# Patient Record
Sex: FEMALE | Race: OTHER | Employment: UNEMPLOYED | URBAN - METROPOLITAN AREA
[De-identification: names, ages, dates, MRNs, and addresses within clinical notes are randomized per-mention and may not be internally consistent; named-entity substitution may affect disease eponyms.]

---

## 2017-09-01 PROBLEM — O35.5XX0 DRUG FETAL DAMAGE SUSPECTED IN PREGNANCY: Status: ACTIVE | Noted: 2017-09-01

## 2017-09-19 PROBLEM — O26.899 ABDOMINAL CRAMPING AFFECTING PREGNANCY, ANTEPARTUM: Status: ACTIVE | Noted: 2017-09-19

## 2017-12-08 PROBLEM — Z34.90 INTRAUTERINE PREGNANCY: Status: ACTIVE | Noted: 2017-12-08

## 2017-12-20 PROBLEM — O34.43 PREVIOUS OPERATION TO CERVIX AFFECTING PREGNANCY IN THIRD TRIMESTER: Status: ACTIVE | Noted: 2017-09-01

## 2018-03-03 PROBLEM — F29 PSYCHOSIS (HCC): Status: ACTIVE | Noted: 2018-03-03

## 2018-03-05 PROBLEM — F23 BRIEF PSYCHOTIC DISORDER (HCC): Status: ACTIVE | Noted: 2018-03-05

## 2021-10-18 PROBLEM — F39 MOOD DISORDER (HCC): Status: ACTIVE | Noted: 2021-10-18

## 2021-10-19 PROBLEM — F31.2 SEVERE MANIC BIPOLAR 1 DISORDER WITH PSYCHOTIC BEHAVIOR (HCC): Status: ACTIVE | Noted: 2021-10-19

## 2023-01-21 ENCOUNTER — APPOINTMENT (OUTPATIENT)
Dept: GENERAL RADIOLOGY | Age: 38
End: 2023-01-21
Payer: COMMERCIAL

## 2023-01-21 ENCOUNTER — HOSPITAL ENCOUNTER (EMERGENCY)
Age: 38
Discharge: HOME | End: 2023-01-21
Attending: EMERGENCY MEDICINE
Payer: COMMERCIAL

## 2023-01-21 VITALS
TEMPERATURE: 97.8 F | SYSTOLIC BLOOD PRESSURE: 131 MMHG | HEART RATE: 81 BPM | DIASTOLIC BLOOD PRESSURE: 78 MMHG | BODY MASS INDEX: 32.1 KG/M2 | WEIGHT: 170 LBS | OXYGEN SATURATION: 99 % | HEIGHT: 61 IN | RESPIRATION RATE: 16 BRPM

## 2023-01-21 DIAGNOSIS — R10.13 ABDOMINAL PAIN, EPIGASTRIC: Primary | ICD-10-CM

## 2023-01-21 DIAGNOSIS — R51.9 ACUTE NONINTRACTABLE HEADACHE, UNSPECIFIED HEADACHE TYPE: ICD-10-CM

## 2023-01-21 LAB
ALBUMIN SERPL-MCNC: 4.7 G/DL (ref 3.5–5.2)
ALP BLD-CCNC: 67 U/L (ref 35–104)
ALT SERPL-CCNC: 22 U/L (ref 0–32)
ANION GAP SERPL CALCULATED.3IONS-SCNC: 11 MMOL/L (ref 7–16)
AST SERPL-CCNC: 21 U/L (ref 0–31)
BACTERIA: NORMAL /HPF
BASOPHILS ABSOLUTE: 0.06 E9/L (ref 0–0.2)
BASOPHILS RELATIVE PERCENT: 1 % (ref 0–2)
BILIRUB SERPL-MCNC: 0.2 MG/DL (ref 0–1.2)
BILIRUBIN URINE: NEGATIVE
BLOOD, URINE: ABNORMAL
BUN BLDV-MCNC: 6 MG/DL (ref 6–20)
CALCIUM SERPL-MCNC: 9.4 MG/DL (ref 8.6–10.2)
CHLORIDE BLD-SCNC: 101 MMOL/L (ref 98–107)
CLARITY: CLEAR
CO2: 23 MMOL/L (ref 22–29)
COLOR: YELLOW
CREAT SERPL-MCNC: 0.7 MG/DL (ref 0.5–1)
EOSINOPHILS ABSOLUTE: 0.04 E9/L (ref 0.05–0.5)
EOSINOPHILS RELATIVE PERCENT: 0.6 % (ref 0–6)
EPITHELIAL CELLS, UA: NORMAL /HPF
GFR SERPL CREATININE-BSD FRML MDRD: >60 ML/MIN/1.73
GLUCOSE BLD-MCNC: 91 MG/DL (ref 74–99)
GLUCOSE URINE: NEGATIVE MG/DL
HCG(URINE) PREGNANCY TEST: NEGATIVE
HCG, URINE, POC: NEGATIVE
HCT VFR BLD CALC: 37.4 % (ref 34–48)
HEMOGLOBIN: 12.4 G/DL (ref 11.5–15.5)
IMMATURE GRANULOCYTES #: 0.02 E9/L
IMMATURE GRANULOCYTES %: 0.3 % (ref 0–5)
KETONES, URINE: NEGATIVE MG/DL
LEUKOCYTE ESTERASE, URINE: NEGATIVE
LIPASE: 41 U/L (ref 13–60)
LYMPHOCYTES ABSOLUTE: 2.1 E9/L (ref 1.5–4)
LYMPHOCYTES RELATIVE PERCENT: 33.8 % (ref 20–42)
Lab: NORMAL
MCH RBC QN AUTO: 31.2 PG (ref 26–35)
MCHC RBC AUTO-ENTMCNC: 33.2 % (ref 32–34.5)
MCV RBC AUTO: 94.2 FL (ref 80–99.9)
MONOCYTES ABSOLUTE: 0.48 E9/L (ref 0.1–0.95)
MONOCYTES RELATIVE PERCENT: 7.7 % (ref 2–12)
NEGATIVE QC PASS/FAIL: NORMAL
NEUTROPHILS ABSOLUTE: 3.52 E9/L (ref 1.8–7.3)
NEUTROPHILS RELATIVE PERCENT: 56.6 % (ref 43–80)
NITRITE, URINE: NEGATIVE
PDW BLD-RTO: 13.2 FL (ref 11.5–15)
PH UA: 7 (ref 5–9)
PLATELET # BLD: 330 E9/L (ref 130–450)
PMV BLD AUTO: 9.6 FL (ref 7–12)
POSITIVE QC PASS/FAIL: NORMAL
POTASSIUM REFLEX MAGNESIUM: 4 MMOL/L (ref 3.5–5)
PROTEIN UA: NEGATIVE MG/DL
RBC # BLD: 3.97 E12/L (ref 3.5–5.5)
RBC UA: NORMAL /HPF (ref 0–2)
SODIUM BLD-SCNC: 135 MMOL/L (ref 132–146)
SPECIFIC GRAVITY UA: 1.01 (ref 1–1.03)
TOTAL PROTEIN: 7.7 G/DL (ref 6.4–8.3)
TROPONIN, HIGH SENSITIVITY: <6 NG/L (ref 0–9)
UROBILINOGEN, URINE: 0.2 E.U./DL
WBC # BLD: 6.2 E9/L (ref 4.5–11.5)
WBC UA: NORMAL /HPF (ref 0–5)

## 2023-01-21 PROCEDURE — 2580000003 HC RX 258

## 2023-01-21 PROCEDURE — 81025 URINE PREGNANCY TEST: CPT

## 2023-01-21 PROCEDURE — 93005 ELECTROCARDIOGRAM TRACING: CPT

## 2023-01-21 PROCEDURE — 85025 COMPLETE CBC W/AUTO DIFF WBC: CPT

## 2023-01-21 PROCEDURE — 81001 URINALYSIS AUTO W/SCOPE: CPT

## 2023-01-21 PROCEDURE — 80053 COMPREHEN METABOLIC PANEL: CPT

## 2023-01-21 PROCEDURE — 71045 X-RAY EXAM CHEST 1 VIEW: CPT

## 2023-01-21 PROCEDURE — 96374 THER/PROPH/DIAG INJ IV PUSH: CPT

## 2023-01-21 PROCEDURE — 96372 THER/PROPH/DIAG INJ SC/IM: CPT

## 2023-01-21 PROCEDURE — 84484 ASSAY OF TROPONIN QUANT: CPT

## 2023-01-21 PROCEDURE — 6370000000 HC RX 637 (ALT 250 FOR IP)

## 2023-01-21 PROCEDURE — 83690 ASSAY OF LIPASE: CPT

## 2023-01-21 PROCEDURE — 6360000002 HC RX W HCPCS

## 2023-01-21 PROCEDURE — 99285 EMERGENCY DEPT VISIT HI MDM: CPT

## 2023-01-21 RX ORDER — DICYCLOMINE HYDROCHLORIDE 10 MG/ML
20 INJECTION INTRAMUSCULAR ONCE
Status: COMPLETED | OUTPATIENT
Start: 2023-01-21 | End: 2023-01-21

## 2023-01-21 RX ORDER — 0.9 % SODIUM CHLORIDE 0.9 %
1000 INTRAVENOUS SOLUTION INTRAVENOUS ONCE
Status: COMPLETED | OUTPATIENT
Start: 2023-01-21 | End: 2023-01-21

## 2023-01-21 RX ORDER — METOCLOPRAMIDE HYDROCHLORIDE 5 MG/ML
10 INJECTION INTRAMUSCULAR; INTRAVENOUS ONCE
Status: COMPLETED | OUTPATIENT
Start: 2023-01-21 | End: 2023-01-21

## 2023-01-21 RX ADMIN — SODIUM CHLORIDE 1000 ML: 9 INJECTION, SOLUTION INTRAVENOUS at 16:09

## 2023-01-21 RX ADMIN — METOCLOPRAMIDE 10 MG: 5 INJECTION, SOLUTION INTRAMUSCULAR; INTRAVENOUS at 16:16

## 2023-01-21 RX ADMIN — DICYCLOMINE HYDROCHLORIDE 20 MG: 10 INJECTION, SOLUTION INTRAMUSCULAR at 16:19

## 2023-01-21 RX ADMIN — ALUMINUM HYDROXIDE, MAGNESIUM HYDROXIDE, AND SIMETHICONE: 200; 200; 20 SUSPENSION ORAL at 16:08

## 2023-01-21 ASSESSMENT — PAIN - FUNCTIONAL ASSESSMENT: PAIN_FUNCTIONAL_ASSESSMENT: NONE - DENIES PAIN

## 2023-01-21 NOTE — ED PROVIDER NOTES
[address]        Pt Name: Rosas Munoz  MRN: [id number]  [place] 1985  Date of evaluation: 1/21/2023  Provider: Manjit Barboza DO  PCP: No primary care provider on file. Note Started: 3:19 PM EST 1/21/23    CHIEF COMPLAINT       Chief Complaint   Patient presents with    Abdominal Pain     Mid upper abdominal pain radiates into back, stabbing pain constant x1 week    Headache    Nausea       HISTORY OF PRESENT ILLNESS: 1 or more Elements   History From: Patient    Limitations to history : None    Rosas Munoz is a 40 y.o. female who presents to the emergency department with chief complaint of epigastric abdominal pain with some nausea over the past week. She states that it has been worsening and that yesterday it became very severe and she has been having some radiation to her mid back. She states that she also has a little bit of radiation near her sternum where it feels like pressure that seems to come and go. In addition to this she has a little bit of abdominal distention and some constipation however she says that she did have a normal bowel movement yesterday evening and has been able to pass gas since then. Nothing seems to worsen or improve her symptoms. Patient is known to gastroenterology and is prescribed Protonix as well as Zofran for GI symptoms. Patient is scheduled for EGD in the near future. She does admit to having a little bit of headache as well that started this morning and was gradual in onset described as a dull achy pain without meningeal symptoms. She has had headaches like this before. She states that she has not had any vomiting and denies any fever, chills, hematuria or dysuria, diarrhea. Nursing Notes were all reviewed and agreed with or any disagreements were addressed in the HPI. REVIEW OF SYSTEMS :      Positives and Pertinent negatives as per HPI.      SURGICAL HISTORY Past Surgical History:   Procedure Laterality Date    BREAST SURGERY      Removal of cyst on left     SECTION      x4       CURRENTMEDICATIONS       Discharge Medication List as of 2023  5:53 PM        CONTINUE these medications which have NOT CHANGED    Details   famotidine (PEPCID) 20 MG tablet Take 1 tablet by mouth 2 times daily for 20 days, Disp-40 tablet, R-0Normal      ondansetron (ZOFRAN-ODT) 4 MG disintegrating tablet Take 1 tablet by mouth 3 times daily as needed for Nausea or Vomiting, Disp-6 tablet, R-0Normal      gabapentin (NEURONTIN) 300 MG capsule Take 1 capsule by mouth 3 times daily for 90 days.  Intended supply: 30 days, Disp-90 capsule, R-2Normal      ARIPiprazole (ABILIFY IM) Inject into the muscleHistorical Med      ibuprofen (ADVIL;MOTRIN) 800 MG tablet Take 1 tablet by mouth every 8 hours as needed for Pain, Disp-60 tablet, R-0Print             ALLERGIES     Pcn [penicillins]    FAMILYHISTORY       Family History   Problem Relation Age of Onset    Diabetes Paternal Grandmother     Diabetes Maternal Grandmother     Diabetes Maternal Grandfather     Diabetes Father     Heart Attack Father         SOCIAL HISTORY       Social History     Tobacco Use    Smoking status: Every Day     Packs/day: 1.00     Years: 16.00     Pack years: 16.00     Types: Cigarettes    Smokeless tobacco: Never    Tobacco comments:     Pt states she used vapor   Vaping Use    Vaping Use: Former    Substances: Always   Substance Use Topics    Alcohol use: Yes     Comment: socially    Drug use: Yes     Types: Marijuana (Weed)       SCREENINGS        [facility] Coma Scale  Eye Opening: Spontaneous  Best Verbal Response: Oriented  Best Motor Response: Obeys commands  [place] Coma Scale Score: 15                CIWA Assessment  BP: 131/78  Heart Rate: 81           PHYSICAL EXAM  1 or more Elements     ED Triage Vitals [23 1409]   BP Temp Temp Source Heart Rate Resp SpO2 Height Weight   123/87 97.8 °F (36.6 °C) Oral 79 14 100 % 5' 1\" (1.549 m) 170 lb (77.1 kg)       Constitutional/General: Alert and oriented x3  Head: Normocephalic and atraumatic  Eyes: PERRL, EOMI, conjunctiva normal, sclera non icteric  ENT:  Oropharynx clear, handling secretions, no trismus, no asymmetry of the posterior oropharynx or uvular edema  Neck: Supple, full ROM, no stridor, no meningeal signs  Respiratory: Lungs clear to auscultation bilaterally, no wheezes, rales, or rhonchi. Not in respiratory distress  Cardiovascular:  Regular rate. Regular rhythm. No murmurs, no gallops, no rubs. 2+ distal pulses. Equal extremity pulses. Chest: No chest wall tenderness  GI:  Abdomen Soft, mild epigastric discomfort with slight distention. +BS. No rebound, guarding, or rigidity. No pulsatile masses. Musculoskeletal: Moves all extremities x 4. Warm and well perfused, no clubbing, no cyanosis, no edema. Capillary refill <3 seconds  Integument: skin warm and dry. No rashes. Neurologic: GCS 15, no focal deficits, symmetric strength 5/5 in the upper and lower extremities bilaterally  Psychiatric: Normal Affect      DIAGNOSTIC RESULTS   LABS:    Labs Reviewed   CBC WITH AUTO DIFFERENTIAL - Abnormal; Notable for the following components:       Result Value    Eosinophils Absolute 0.04 (*)     All other components within normal limits   URINALYSIS - Abnormal; Notable for the following components:    Blood, Urine SMALL (*)     All other components within normal limits   COMPREHENSIVE METABOLIC PANEL W/ REFLEX TO MG FOR LOW K   LIPASE   TROPONIN   PREGNANCY, URINE   MICROSCOPIC URINALYSIS   POC PREGNANCY UR-QUAL       As interpreted by me, the above displayed labs are abnormal. All other labs obtained during this visit were within normal range or not returned as of this dictation. EKG Interpretation  Interpreted by emergency department resident physician, Floyd Mcdaniel, DO  Normal sinus bradycardia 57 bpm with QTC of 422. Normal axis.   No acute ST changes or elevations noted as compared to previous EKG on 1/5/2023. RADIOLOGY:   Non-plain film images such as CT, Ultrasound and MRI are read by the radiologist. Plain radiographic images are visualized and preliminarily interpreted by the ED Provider with the below findings:    My interpretation of portable chest x-ray is no pneumonia, pneumothorax, or acute fractures. Interpretation per the Radiologist below, if available at the time of this note:    XR CHEST PORTABLE   Final Result   Normal chest           No results found. No results found. PROCEDURES   Unless otherwise noted below, none    PAST MEDICAL HISTORY/Chronic Conditions Affecting Care      has a past medical history of Anxiety, Bipolar disorder ([facility] [address] Depression, and Seizures ([facility] [address] (11/17/2018).      EMERGENCY DEPARTMENT COURSE    Vitals:    Vitals:    01/21/23 1409 01/21/23 1819   BP: 123/87 131/78   Pulse: 79 81   Resp: 14 16   Temp: 97.8 °F (36.6 °C) 97.8 °F (36.6 °C)   TempSrc: Oral Oral   SpO2: 100% 99%   Weight: 170 lb (77.1 kg)    Height: 5' 1\" (1.549 m)        Patient was given the following medications:  Medications   dicyclomine (BENTYL) injection 20 mg (20 mg IntraMUSCular Given 1/21/23 1619)   aluminum & magnesium hydroxide-simethicone (MAALOX) 30 mL, lidocaine viscous hcl (XYLOCAINE) 5 mL (GI COCKTAIL) ( Oral Given 1/21/23 1608)   0.9 % sodium chloride bolus (0 mLs IntraVENous Stopped 1/21/23 1743)   metoclopramide (REGLAN) injection 10 mg (10 mg IntraVENous Given 1/21/23 1616)       Medical Decision Making/Differential Diagnosis:    CC/HPI Summary, Social Determinants of health, Records Reviewed, DDx, testing done/not done, ED Course, Reassessment, disposition considerations/shared decision making with patient, consults, disposition:        CC/HPI Summary, DDx, ED Course, Reassessment, Tests Considered, Patient expectation:     55-year-old female presenting to the emergency department chief complaint of abdominal pain and headache. Differential diagnosis for the patient includes peptic ulcer disease, gastroesophageal reflux, tension type headache or migraine type headache. Patient has significant improvement of her symptoms after 1 L of fluid, Reglan, and GI cocktail. Due to her presenting symptoms, patient's diet consisting largely of fried foods as well as spicy foods and laboratory results, high likelihood for gastroesophageal reflux disease. ED Course as of 01/21/23 2313   Sat Jan 21, 2023   1517 Evaluated at bedside at this time and is resting comfortably and is hemodynamically stable. Laboratory studies ordered and patient to be given Bentyl, GI cocktail, and 1 L of normal saline. Pending urine pregnancy to give Zofran or Reglan for nausea. [RW]   [phone] Patient seen and examined. Patient reporting epigastric and chest pain. She has a history of GERD. Labs and imaging will be ordered to evaluate for possible GERD versus gastritis patient is complaining of some chest pain so a troponin and EKG were added on. Symptoms seem consistent with GI concern. But will check cardiac lab work. [CF]   3112 Patient reevaluated at this time and laboratory results grossly within normal limits. Patient getting medications right now, to be reevaluated shortly. Pending EKG and chest x-ray. [RW]   9552 Patient reevaluated at bedside and is resting comfortably and states that she is feeling significantly improved after medication administration. Pending chest x-ray [RW]   1722 Patient reevaluated and states that she feels well at this time. Informed of laboratory and imaging studies. Pending EKG and chest x-ray results. [RW]   0537 Chest x-ray returns within normal limits without pneumonia, pneumothorax, or acute fractures. [RW]   1746 EKG: This EKG is signed and interpreted by the EP.     Time: 17:41  Rate: 57  Rhythm: Sinus  Interpretation: sinus bradycardia  Comparison: stable as compared to patient's most recent EKG   [CF] [phone] 09 with patient and she is willing to be discharged home and states that she feels significantly better. [RW]      ED Course User Index  [CF] Dinora Stoll DO  [RW] Mildred Pemberton DO   Informed of all laboratory and imaging findings and all of her questions were answered. She has remained closely monitored, she has remained hemodynamically stable, and decision to discharge home has been made. Patient is understanding and agreeable with this decision. She is encouraged to follow-up with her family physician over the next several days and to return to this emergency department if her symptoms persist or worsen. She is also instructed to reach out to her gastroenterologist on Monday morning and let them know of this emergency department visit. Patient discharged home at this time. Considered more extensive work-up including CT abdomen pelvis however patient has had improvement of her symptoms after given medications and her abdominal exam was relatively unimpressive in terms of pain. No further evaluation determined to be needed and abdominal labs returned within normal limits as well. Social Determinants affecting Dx or Tx: Patient has good follow-up with her specialist for gastroenterology but does not have a family physician. Will be referred to a local family physician for continuity of care. Chronic Conditions: Depression and anxiety as well as bipolar disorder. Records Reviewed: EKG from 1/5/2023 reviewed in comparison to EKG performed today. I am the Primary Clinician of Record. CONSULTS: (Who and What was discussed)  None    I am the Primary Clinician of Record. FINAL IMPRESSION      1. Abdominal pain, epigastric    2.  Acute nonintractable headache, unspecified headache type          DISPOSITION/PLAN     DISPOSITION Decision To Discharge 01/21/2023 05:53:32 PM      PATIENT REFERRED TO:  [address] [place] [address] Emergency Department  [address] [place] [address] [address] [facility] [address] [phone]  Go to   If symptoms worsen    [facility]) PCP  [phone]  Schedule an appointment as soon as possible for a visit in 2 days  Establish care    DISCHARGE MEDICATIONS:  Discharge Medication List as of 1/21/2023  5:53 PM               (Please note that portions of this note were completed with a voice recognition program.  Efforts were made to edit the dictations but occasionally words are mis-transcribed.)    Orly Varghese DO (electronically signed)           Orly Varghese DO  Resident  01/21/23 6048

## 2023-01-21 NOTE — ED NOTES
Department of Emergency Medicine    FIRST PROVIDER TRIAGE NOTE             Independent [facility]LP           23  2:11 PM EST    Date of Encounter: 23   MRN: [id number]    Vitals:    23 1409   BP: 123/87   Pulse: 79   Resp: 14   Temp: 97.8 °F (36.6 °C)   TempSrc: Oral   SpO2: 100%   Weight: 170 lb (77.1 kg)   Height: 5' 1\" (1.549 m)      HPI: Sri Arias is a 40 y.o. female who presents to the ED for Abdominal Pain (Mid upper abdominal pain radiates into back, stabbing pain constant x1 week), Headache, and Nausea   Hx      ROS: Negative for cp or sob. Physical Exam:   Gen Appearance/Constitutional: alert  CV: regular rate     Initial Plan of Care: All treatment areas with department are currently occupied. Plan to order/Initiate the following while awaiting opening in ED: labs.     Initial Plan of Care: Initiate Treatment-Testing, Proceed toTreatment Area When Bed Available for ED Attending/MLP to Continue Care    Electronically signed by Brendan Díaz PA-C   DD: 23       Brendan Díaz PA-C  [phone]

## 2023-01-21 NOTE — DISCHARGE INSTRUCTIONS
Please call and follow-up with your newly referred family physician over the next several days and return to the emergency department if symptoms persist or worsen. Contact your gastroenterologist and inform them of this emergency department visit. Continue regularly scheduled medications. Reduce ingestion of spicy foods, acidic foods, fried foods, and be sure to not eat too closely to bedtime.

## 2023-01-22 LAB
EKG ATRIAL RATE: 57 BPM
EKG P AXIS: 51 DEGREES
EKG P-R INTERVAL: 142 MS
EKG Q-T INTERVAL: 434 MS
EKG QRS DURATION: 74 MS
EKG QTC CALCULATION (BAZETT): 422 MS
EKG R AXIS: 17 DEGREES
EKG T AXIS: 20 DEGREES
EKG VENTRICULAR RATE: 57 BPM

## 2023-01-22 PROCEDURE — 93010 ELECTROCARDIOGRAM REPORT: CPT | Performed by: INTERNAL MEDICINE

## 2023-08-06 PROCEDURE — 99285 EMERGENCY DEPT VISIT HI MDM: CPT

## 2023-08-06 ASSESSMENT — PAIN DESCRIPTION - LOCATION: LOCATION: ABDOMEN;HEAD

## 2023-08-06 ASSESSMENT — PAIN DESCRIPTION - ORIENTATION: ORIENTATION: MID

## 2023-08-06 ASSESSMENT — PAIN - FUNCTIONAL ASSESSMENT: PAIN_FUNCTIONAL_ASSESSMENT: 0-10

## 2023-08-06 ASSESSMENT — PAIN SCALES - GENERAL: PAINLEVEL_OUTOF10: 10

## 2023-08-06 ASSESSMENT — PAIN DESCRIPTION - PAIN TYPE: TYPE: ACUTE PAIN

## 2023-08-06 ASSESSMENT — PAIN DESCRIPTION - DESCRIPTORS: DESCRIPTORS: ACHING

## 2023-08-07 ENCOUNTER — APPOINTMENT (OUTPATIENT)
Dept: CT IMAGING | Age: 38
End: 2023-08-07
Payer: COMMERCIAL

## 2023-08-07 ENCOUNTER — APPOINTMENT (OUTPATIENT)
Dept: GENERAL RADIOLOGY | Age: 38
End: 2023-08-07
Payer: COMMERCIAL

## 2023-08-07 ENCOUNTER — HOSPITAL ENCOUNTER (EMERGENCY)
Age: 38
Discharge: HOME | End: 2023-08-07
Attending: STUDENT IN AN ORGANIZED HEALTH CARE EDUCATION/TRAINING PROGRAM
Payer: COMMERCIAL

## 2023-08-07 VITALS
HEART RATE: 61 BPM | DIASTOLIC BLOOD PRESSURE: 69 MMHG | BODY MASS INDEX: 28.32 KG/M2 | RESPIRATION RATE: 18 BRPM | WEIGHT: 150 LBS | TEMPERATURE: 98.6 F | HEIGHT: 61 IN | OXYGEN SATURATION: 98 % | SYSTOLIC BLOOD PRESSURE: 95 MMHG

## 2023-08-07 DIAGNOSIS — R10.30 LOWER ABDOMINAL PAIN: ICD-10-CM

## 2023-08-07 DIAGNOSIS — K59.00 CONSTIPATION, UNSPECIFIED CONSTIPATION TYPE: Primary | ICD-10-CM

## 2023-08-07 LAB
ALBUMIN SERPL-MCNC: 3.8 G/DL (ref 3.5–5.2)
ALP SERPL-CCNC: 89 U/L (ref 35–104)
ALT SERPL-CCNC: 22 U/L (ref 0–32)
AMORPH SED URNS QL MICRO: PRESENT
ANION GAP SERPL CALCULATED.3IONS-SCNC: 10 MMOL/L (ref 7–16)
AST SERPL-CCNC: 21 U/L (ref 0–31)
BACTERIA URNS QL MICRO: ABNORMAL
BASOPHILS # BLD: 0.06 K/UL (ref 0–0.2)
BASOPHILS NFR BLD: 1 % (ref 0–2)
BILIRUB DIRECT SERPL-MCNC: <0.2 MG/DL (ref 0–0.3)
BILIRUB INDIRECT SERPL-MCNC: NORMAL MG/DL (ref 0–1)
BILIRUB SERPL-MCNC: <0.2 MG/DL (ref 0–1.2)
BILIRUB UR QL STRIP: NEGATIVE
BUN SERPL-MCNC: 14 MG/DL (ref 6–20)
CALCIUM SERPL-MCNC: 8.9 MG/DL (ref 8.6–10.2)
CHLORIDE SERPL-SCNC: 104 MMOL/L (ref 98–107)
CLARITY UR: CLEAR
CO2 SERPL-SCNC: 23 MMOL/L (ref 22–29)
COLOR UR: YELLOW
CREAT SERPL-MCNC: 0.7 MG/DL (ref 0.5–1)
D DIMER: <200 NG/ML DDU (ref 0–232)
EKG ATRIAL RATE: 59 BPM
EKG P AXIS: 53 DEGREES
EKG P-R INTERVAL: 130 MS
EKG Q-T INTERVAL: 434 MS
EKG QRS DURATION: 82 MS
EKG QTC CALCULATION (BAZETT): 429 MS
EKG R AXIS: 31 DEGREES
EKG T AXIS: 25 DEGREES
EKG VENTRICULAR RATE: 59 BPM
EOSINOPHIL # BLD: 0.19 K/UL (ref 0.05–0.5)
EOSINOPHILS RELATIVE PERCENT: 2 % (ref 0–6)
ERYTHROCYTE [DISTWIDTH] IN BLOOD BY AUTOMATED COUNT: 13.3 % (ref 11.5–15)
GFR SERPL CREATININE-BSD FRML MDRD: >60 ML/MIN/1.73M2
GLUCOSE SERPL-MCNC: 97 MG/DL (ref 74–99)
GLUCOSE UR STRIP-MCNC: NEGATIVE MG/DL
HCG, URINE, POC: NEGATIVE
HCT VFR BLD AUTO: 38.5 % (ref 34–48)
HGB BLD-MCNC: 12.6 G/DL (ref 11.5–15.5)
HGB UR QL STRIP.AUTO: ABNORMAL
IMM GRANULOCYTES # BLD AUTO: 0.05 K/UL (ref 0–0.58)
IMM GRANULOCYTES NFR BLD: 1 % (ref 0–5)
KETONES UR STRIP-MCNC: NEGATIVE MG/DL
LACTATE BLDV-SCNC: 0.9 MMOL/L (ref 0.5–2.2)
LEUKOCYTE ESTERASE UR QL STRIP: NEGATIVE
LIPASE SERPL-CCNC: 64 U/L (ref 13–60)
LYMPHOCYTES NFR BLD: 3.52 K/UL (ref 1.5–4)
LYMPHOCYTES RELATIVE PERCENT: 33 % (ref 20–42)
Lab: NORMAL
MCH RBC QN AUTO: 31.3 PG (ref 26–35)
MCHC RBC AUTO-ENTMCNC: 32.7 G/DL (ref 32–34.5)
MCV RBC AUTO: 95.5 FL (ref 80–99.9)
MONOCYTES NFR BLD: 0.77 K/UL (ref 0.1–0.95)
MONOCYTES NFR BLD: 7 % (ref 2–12)
NEGATIVE QC PASS/FAIL: NORMAL
NEUTROPHILS NFR BLD: 57 % (ref 43–80)
NEUTS SEG NFR BLD: 6.09 K/UL (ref 1.8–7.3)
NITRITE UR QL STRIP: NEGATIVE
PH UR STRIP: 6 [PH] (ref 5–9)
PLATELET # BLD AUTO: 259 K/UL (ref 130–450)
PMV BLD AUTO: 10.2 FL (ref 7–12)
POSITIVE QC PASS/FAIL: NORMAL
POTASSIUM SERPL-SCNC: 3.9 MMOL/L (ref 3.5–5)
PROT SERPL-MCNC: 6.4 G/DL (ref 6.4–8.3)
PROT UR STRIP-MCNC: NEGATIVE MG/DL
RBC # BLD AUTO: 4.03 M/UL (ref 3.5–5.5)
RBC #/AREA URNS HPF: ABNORMAL /HPF
SODIUM SERPL-SCNC: 137 MMOL/L (ref 132–146)
SP GR UR STRIP: 1.02 (ref 1–1.03)
TROPONIN I SERPL HS-MCNC: <6 NG/L (ref 0–9)
UROBILINOGEN UR STRIP-ACNC: 0.2 EU/DL (ref 0–1)
WBC #/AREA URNS HPF: ABNORMAL /HPF
WBC OTHER # BLD: 10.7 K/UL (ref 4.5–11.5)

## 2023-08-07 PROCEDURE — 6360000004 HC RX CONTRAST MEDICATION: Performed by: RADIOLOGY

## 2023-08-07 PROCEDURE — 74177 CT ABD & PELVIS W/CONTRAST: CPT

## 2023-08-07 PROCEDURE — 80053 COMPREHEN METABOLIC PANEL: CPT

## 2023-08-07 PROCEDURE — 93010 ELECTROCARDIOGRAM REPORT: CPT | Performed by: INTERNAL MEDICINE

## 2023-08-07 PROCEDURE — 83605 ASSAY OF LACTIC ACID: CPT

## 2023-08-07 PROCEDURE — 71045 X-RAY EXAM CHEST 1 VIEW: CPT

## 2023-08-07 PROCEDURE — 84484 ASSAY OF TROPONIN QUANT: CPT

## 2023-08-07 PROCEDURE — 93005 ELECTROCARDIOGRAM TRACING: CPT

## 2023-08-07 PROCEDURE — 85379 FIBRIN DEGRADATION QUANT: CPT

## 2023-08-07 PROCEDURE — 83690 ASSAY OF LIPASE: CPT

## 2023-08-07 PROCEDURE — 81001 URINALYSIS AUTO W/SCOPE: CPT

## 2023-08-07 PROCEDURE — 82248 BILIRUBIN DIRECT: CPT

## 2023-08-07 PROCEDURE — 85025 COMPLETE CBC W/AUTO DIFF WBC: CPT

## 2023-08-07 RX ORDER — DOCUSATE SODIUM 100 MG/1
100 CAPSULE, LIQUID FILLED ORAL 2 TIMES DAILY
Qty: 14 CAPSULE | Refills: 0 | Status: SHIPPED | OUTPATIENT
Start: 2023-08-07 | End: 2023-08-14

## 2023-08-07 RX ORDER — POLYETHYLENE GLYCOL 3350 17 G/17G
17 POWDER, FOR SOLUTION ORAL DAILY PRN
Qty: 510 G | Refills: 0 | Status: SHIPPED | OUTPATIENT
Start: 2023-08-07 | End: 2023-09-06

## 2023-08-07 RX ADMIN — IOPAMIDOL 75 ML: 755 INJECTION, SOLUTION INTRAVENOUS at 03:39

## 2023-08-07 ASSESSMENT — LIFESTYLE VARIABLES
HOW MANY STANDARD DRINKS CONTAINING ALCOHOL DO YOU HAVE ON A TYPICAL DAY: 3 OR 4
HOW OFTEN DO YOU HAVE A DRINK CONTAINING ALCOHOL: 2-4 TIMES A MONTH

## 2023-08-07 NOTE — ED PROVIDER NOTES
[address] [facility]        Pt Name: Dmitriy Alicia  MRN: [id number]  [address] [place] 1985  Date of evaluation: 8/6/2023  Provider: Dominique Michelle MD  PCP: No primary care provider on file. Note Started: 2:13 AM EDT 8/7/23    CHIEF COMPLAINT       Chief Complaint   Patient presents with    Abdominal Pain     Headache       HISTORY OF PRESENT ILLNESS: 1 or more Elements   History From: Patient    Limitations to history : None    Jesse Ciaraashi E Carlyle Canavan is a 40 y.o. female with past medical history of polysubstance abuse, bipolar 1 disorder who presents left lower quadrant abdominal pain that began earlier this evening. She states pain is constant, has been progressing, does not radiate, localized to the left lower quadrant, is made worse by eating, alleviating factors, is sharp and stabbing in quality, and is currently 8 out of 10 pain scale. Patient also endorses a headache she believes is secondary to the abdominal pain. Patient states headache is generalized, pressure-like sensation, has no aggravating or alleviating factors, and is currently an 8 out of 10 in severity. Patient states a past history of migraines, states this is less severe than previous migraines. Patient denies any photophobia or vision changes. Patient also endorses some chest tightness that began around the same time as abdominal pain, is generalized chest, has no alleviating or aggravating factors, and is mild in intensity. Patient denies any diarrhea, vomiting, nausea, urinary changes, genital odor or discharge, STD exposure, possibly of pregnancy, dizziness, or lightheadedness. Patient also endorses some low back pain, shortness of breath, and increased constipation recently. Patient does endorse smoking 1 to 2 packs/day of cigarettes, marijuana use, and recent EtOH use this past weekend.   Of note, patient has a past medical history of

## 2023-10-04 ENCOUNTER — OFFICE VISIT (OUTPATIENT)
Dept: NEUROSURGERY | Age: 38
End: 2023-10-04
Payer: COMMERCIAL

## 2023-10-04 DIAGNOSIS — G89.29 CHRONIC NECK AND BACK PAIN: Primary | ICD-10-CM

## 2023-10-04 DIAGNOSIS — M54.2 CHRONIC NECK AND BACK PAIN: Primary | ICD-10-CM

## 2023-10-04 DIAGNOSIS — M54.9 CHRONIC NECK AND BACK PAIN: Primary | ICD-10-CM

## 2023-10-04 DIAGNOSIS — M54.2 MUSCULOSKELETAL NECK PAIN: ICD-10-CM

## 2023-10-04 PROCEDURE — G8484 FLU IMMUNIZE NO ADMIN: HCPCS | Performed by: PHYSICIAN ASSISTANT

## 2023-10-04 PROCEDURE — G8427 DOCREV CUR MEDS BY ELIG CLIN: HCPCS | Performed by: PHYSICIAN ASSISTANT

## 2023-10-04 PROCEDURE — G8419 CALC BMI OUT NRM PARAM NOF/U: HCPCS | Performed by: PHYSICIAN ASSISTANT

## 2023-10-04 PROCEDURE — 4004F PT TOBACCO SCREEN RCVD TLK: CPT | Performed by: PHYSICIAN ASSISTANT

## 2023-10-04 PROCEDURE — 99212 OFFICE O/P EST SF 10 MIN: CPT

## 2023-10-04 PROCEDURE — 99214 OFFICE O/P EST MOD 30 MIN: CPT | Performed by: PHYSICIAN ASSISTANT

## 2023-10-04 RX ORDER — METHYLPREDNISOLONE 4 MG/1
TABLET ORAL
Qty: 1 KIT | Refills: 0 | Status: SHIPPED | OUTPATIENT
Start: 2023-10-04 | End: 2023-10-10

## 2023-10-04 RX ORDER — CYCLOBENZAPRINE HCL 10 MG
10 TABLET ORAL 3 TIMES DAILY PRN
Qty: 90 TABLET | Refills: 0 | Status: SHIPPED | OUTPATIENT
Start: 2023-10-04 | End: 2023-11-03

## 2023-10-04 NOTE — PROGRESS NOTES
Problem Focused Office Visit     Subjective: Mariah Torres is a 45 y.o.  female who presents to the office today c/o left sided neck pain with radiation down her left arm stopping at the elbow. Describes the pain as intermittent and sharp. Admits to headaches and numbness/tingling in both hands. Movement of her neck makes the pain worse. She has tried gabapentin without significant relief. Denies recent DANIEL or PT. Denies loss of bowel or bladder, saddle anesthesia, loss of dexterity, abnormal gait, fever, chills, N/V, SOB, or chest pain. Physical Exam:              WDWN, no apparent distress              Non-labored breathing               Vitals Stable              Alert and oriented x3              CN 3-12 intact              PERRL              EOMI              HOLCOMB well              Motor strength symmetric              Sensation to LT intact bilaterally    Assessment: This is a 45 y.o.  female presenting for evaluation of neck pain and left arm pain.      Plan:  -Obtain MRI cervical spine to evaluate for stenosis  -Medrol dosepack and flexeril sent to pharmacy  -PT/massage referral given  -OARRS report reviewed   -Call/return to Neurosurgery clinic after completion of imaging to discuss results and further treatment plan  -Call/return sooner if symptoms worsen or new issues arise in the interim       Electronically signed by Мария Espino PA-C on 10/4/2023 at 4:23 PM

## 2023-10-10 ENCOUNTER — OFFICE VISIT (OUTPATIENT)
Dept: FAMILY MEDICINE CLINIC | Age: 38
End: 2023-10-10
Payer: COMMERCIAL

## 2023-10-10 ENCOUNTER — APPOINTMENT (OUTPATIENT)
Dept: GENERAL RADIOLOGY | Age: 38
End: 2023-10-10
Payer: COMMERCIAL

## 2023-10-10 ENCOUNTER — HOSPITAL ENCOUNTER (EMERGENCY)
Age: 38
Discharge: HOME | End: 2023-10-10
Payer: COMMERCIAL

## 2023-10-10 VITALS
HEART RATE: 87 BPM | HEIGHT: 61 IN | DIASTOLIC BLOOD PRESSURE: 80 MMHG | SYSTOLIC BLOOD PRESSURE: 110 MMHG | OXYGEN SATURATION: 99 % | WEIGHT: 163.2 LBS | TEMPERATURE: 97.4 F | BODY MASS INDEX: 30.81 KG/M2

## 2023-10-10 VITALS
HEART RATE: 93 BPM | SYSTOLIC BLOOD PRESSURE: 138 MMHG | OXYGEN SATURATION: 100 % | TEMPERATURE: 97.3 F | BODY MASS INDEX: 30.78 KG/M2 | RESPIRATION RATE: 18 BRPM | WEIGHT: 163 LBS | DIASTOLIC BLOOD PRESSURE: 94 MMHG | HEIGHT: 61 IN

## 2023-10-10 DIAGNOSIS — M54.9 UPPER BACK PAIN: ICD-10-CM

## 2023-10-10 DIAGNOSIS — R07.9 CHEST PAIN, UNSPECIFIED TYPE: Primary | ICD-10-CM

## 2023-10-10 LAB
ANION GAP SERPL CALCULATED.3IONS-SCNC: 11 MMOL/L (ref 7–16)
BASOPHILS # BLD: 0.03 K/UL (ref 0–0.2)
BASOPHILS NFR BLD: 0 % (ref 0–2)
BUN SERPL-MCNC: 9 MG/DL (ref 6–20)
CALCIUM SERPL-MCNC: 9.3 MG/DL (ref 8.6–10.2)
CHLORIDE SERPL-SCNC: 100 MMOL/L (ref 98–107)
CO2 SERPL-SCNC: 25 MMOL/L (ref 22–29)
CREAT SERPL-MCNC: 0.7 MG/DL (ref 0.5–1)
EOSINOPHIL # BLD: 0.04 K/UL (ref 0.05–0.5)
EOSINOPHILS RELATIVE PERCENT: 0 % (ref 0–6)
ERYTHROCYTE [DISTWIDTH] IN BLOOD BY AUTOMATED COUNT: 13.1 % (ref 11.5–15)
GFR SERPL CREATININE-BSD FRML MDRD: >60 ML/MIN/1.73M2
GLUCOSE SERPL-MCNC: 106 MG/DL (ref 74–99)
HCT VFR BLD AUTO: 39 % (ref 34–48)
HGB BLD-MCNC: 13.1 G/DL (ref 11.5–15.5)
IMM GRANULOCYTES # BLD AUTO: 0.04 K/UL (ref 0–0.58)
IMM GRANULOCYTES NFR BLD: 0 % (ref 0–5)
INFLUENZA A ANTIBODY: NORMAL
INFLUENZA B ANTIBODY: NORMAL
LYMPHOCYTES NFR BLD: 2.85 K/UL (ref 1.5–4)
LYMPHOCYTES RELATIVE PERCENT: 29 % (ref 20–42)
Lab: NORMAL
MCH RBC QN AUTO: 32 PG (ref 26–35)
MCHC RBC AUTO-ENTMCNC: 33.6 G/DL (ref 32–34.5)
MCV RBC AUTO: 95.1 FL (ref 80–99.9)
MONOCYTES NFR BLD: 0.58 K/UL (ref 0.1–0.95)
MONOCYTES NFR BLD: 6 % (ref 2–12)
NEUTROPHILS NFR BLD: 64 % (ref 43–80)
NEUTS SEG NFR BLD: 6.17 K/UL (ref 1.8–7.3)
PERFORMING INSTRUMENT: NORMAL
PLATELET # BLD AUTO: 390 K/UL (ref 130–450)
PMV BLD AUTO: 9.3 FL (ref 7–12)
POTASSIUM SERPL-SCNC: 3.7 MMOL/L (ref 3.5–5)
QC PASS/FAIL: NORMAL
RBC # BLD AUTO: 4.1 M/UL (ref 3.5–5.5)
SARS-COV-2, POC: NORMAL
SODIUM SERPL-SCNC: 136 MMOL/L (ref 132–146)
TROPONIN I SERPL HS-MCNC: <6 NG/L (ref 0–9)
WBC OTHER # BLD: 9.7 K/UL (ref 4.5–11.5)

## 2023-10-10 PROCEDURE — 93005 ELECTROCARDIOGRAM TRACING: CPT | Performed by: PHYSICIAN ASSISTANT

## 2023-10-10 PROCEDURE — G8484 FLU IMMUNIZE NO ADMIN: HCPCS | Performed by: PHYSICIAN ASSISTANT

## 2023-10-10 PROCEDURE — 93000 ELECTROCARDIOGRAM COMPLETE: CPT | Performed by: PHYSICIAN ASSISTANT

## 2023-10-10 PROCEDURE — 84484 ASSAY OF TROPONIN QUANT: CPT

## 2023-10-10 PROCEDURE — G8417 CALC BMI ABV UP PARAM F/U: HCPCS | Performed by: PHYSICIAN ASSISTANT

## 2023-10-10 PROCEDURE — 80048 BASIC METABOLIC PNL TOTAL CA: CPT

## 2023-10-10 PROCEDURE — 72040 X-RAY EXAM NECK SPINE 2-3 VW: CPT

## 2023-10-10 PROCEDURE — 6370000000 HC RX 637 (ALT 250 FOR IP): Performed by: PHYSICIAN ASSISTANT

## 2023-10-10 PROCEDURE — 99215 OFFICE O/P EST HI 40 MIN: CPT | Performed by: PHYSICIAN ASSISTANT

## 2023-10-10 PROCEDURE — 87804 INFLUENZA ASSAY W/OPTIC: CPT | Performed by: PHYSICIAN ASSISTANT

## 2023-10-10 PROCEDURE — G8427 DOCREV CUR MEDS BY ELIG CLIN: HCPCS | Performed by: PHYSICIAN ASSISTANT

## 2023-10-10 PROCEDURE — 4004F PT TOBACCO SCREEN RCVD TLK: CPT | Performed by: PHYSICIAN ASSISTANT

## 2023-10-10 PROCEDURE — 71046 X-RAY EXAM CHEST 2 VIEWS: CPT

## 2023-10-10 PROCEDURE — 99285 EMERGENCY DEPT VISIT HI MDM: CPT

## 2023-10-10 PROCEDURE — 85025 COMPLETE CBC W/AUTO DIFF WBC: CPT

## 2023-10-10 PROCEDURE — 87426 SARSCOV CORONAVIRUS AG IA: CPT | Performed by: PHYSICIAN ASSISTANT

## 2023-10-10 RX ORDER — LAMOTRIGINE 25 MG/1
TABLET ORAL
COMMUNITY
Start: 2023-07-06

## 2023-10-10 RX ORDER — BUSPIRONE HYDROCHLORIDE 15 MG/1
TABLET ORAL
COMMUNITY
Start: 2023-09-28

## 2023-10-10 RX ORDER — NAPROXEN 500 MG/1
500 TABLET ORAL ONCE
Status: COMPLETED | OUTPATIENT
Start: 2023-10-10 | End: 2023-10-10

## 2023-10-10 RX ORDER — ARIPIPRAZOLE 30 MG/1
TABLET ORAL
COMMUNITY
Start: 2023-09-28

## 2023-10-10 RX ORDER — MIRTAZAPINE 30 MG/1
TABLET, FILM COATED ORAL
COMMUNITY
Start: 2023-09-28

## 2023-10-10 RX ORDER — PREDNISONE 20 MG/1
20 TABLET ORAL 2 TIMES DAILY
Qty: 10 TABLET | Refills: 0 | Status: SHIPPED | OUTPATIENT
Start: 2023-10-10 | End: 2023-10-15

## 2023-10-10 RX ORDER — NAPROXEN 500 MG/1
500 TABLET ORAL 2 TIMES DAILY PRN
Qty: 14 TABLET | Refills: 0 | Status: SHIPPED | OUTPATIENT
Start: 2023-10-10

## 2023-10-10 RX ORDER — PREDNISONE 20 MG/1
20 TABLET ORAL ONCE
Status: COMPLETED | OUTPATIENT
Start: 2023-10-10 | End: 2023-10-10

## 2023-10-10 RX ADMIN — PREDNISONE 20 MG: 20 TABLET ORAL at 19:27

## 2023-10-10 RX ADMIN — NAPROXEN 500 MG: 500 TABLET ORAL at 19:27

## 2023-10-10 ASSESSMENT — PAIN DESCRIPTION - LOCATION: LOCATION: BACK;CHEST

## 2023-10-10 ASSESSMENT — PAIN - FUNCTIONAL ASSESSMENT: PAIN_FUNCTIONAL_ASSESSMENT: 0-10

## 2023-10-10 ASSESSMENT — PAIN SCALES - GENERAL: PAINLEVEL_OUTOF10: 9

## 2023-10-10 ASSESSMENT — PAIN DESCRIPTION - PAIN TYPE: TYPE: ACUTE PAIN

## 2023-10-10 ASSESSMENT — PAIN DESCRIPTION - DESCRIPTORS: DESCRIPTORS: SHARP

## 2023-10-10 ASSESSMENT — PAIN DESCRIPTION - FREQUENCY: FREQUENCY: CONTINUOUS

## 2023-10-10 NOTE — PROGRESS NOTES
ED      Clinical Impression:   Darrin Lee was seen today for pain. Diagnoses and all orders for this visit:    Chest pain, unspecified type  -     EKG 12 lead; Future  -     POCT Influenza A/B  -     POCT COVID-19, Antigen  -     EKG 12 lead        go directly to the emergency department.      SIGNATURE: Shani Bennett III, PA-C

## 2023-10-11 LAB
EKG ATRIAL RATE: 73 BPM
EKG P AXIS: 59 DEGREES
EKG P-R INTERVAL: 134 MS
EKG Q-T INTERVAL: 390 MS
EKG QRS DURATION: 74 MS
EKG QTC CALCULATION (BAZETT): 429 MS
EKG R AXIS: 65 DEGREES
EKG T AXIS: 51 DEGREES
EKG VENTRICULAR RATE: 73 BPM

## 2023-10-11 PROCEDURE — 93010 ELECTROCARDIOGRAM REPORT: CPT | Performed by: INTERNAL MEDICINE

## 2024-01-05 ENCOUNTER — HOSPITAL ENCOUNTER (EMERGENCY)
Age: 39
Discharge: LEFT BEFORE DISCHARGE | End: 2024-01-05
Attending: EMERGENCY MEDICINE
Payer: COMMERCIAL

## 2024-01-05 ENCOUNTER — APPOINTMENT (OUTPATIENT)
Dept: GENERAL RADIOLOGY | Age: 39
End: 2024-01-05
Payer: COMMERCIAL

## 2024-01-05 VITALS
DIASTOLIC BLOOD PRESSURE: 80 MMHG | BODY MASS INDEX: 32.1 KG/M2 | HEART RATE: 58 BPM | OXYGEN SATURATION: 98 % | WEIGHT: 170 LBS | HEIGHT: 61 IN | TEMPERATURE: 96.3 F | SYSTOLIC BLOOD PRESSURE: 132 MMHG | RESPIRATION RATE: 16 BRPM

## 2024-01-05 DIAGNOSIS — R07.9 CHEST PAIN, UNSPECIFIED TYPE: ICD-10-CM

## 2024-01-05 DIAGNOSIS — Z72.0 TOBACCO ABUSE: ICD-10-CM

## 2024-01-05 DIAGNOSIS — R06.2 WHEEZING: Primary | ICD-10-CM

## 2024-01-05 LAB
ALBUMIN SERPL-MCNC: 4 G/DL (ref 3.5–5.2)
ALP SERPL-CCNC: 67 U/L (ref 35–104)
ALT SERPL-CCNC: 19 U/L (ref 0–32)
ANION GAP SERPL CALCULATED.3IONS-SCNC: 14 MMOL/L (ref 7–16)
AST SERPL-CCNC: 17 U/L (ref 0–31)
B PARAP IS1001 DNA NPH QL NAA+NON-PROBE: NOT DETECTED
B PERT DNA SPEC QL NAA+PROBE: NOT DETECTED
BACTERIA URNS QL MICRO: ABNORMAL
BASOPHILS # BLD: 0.05 K/UL (ref 0–0.2)
BASOPHILS NFR BLD: 1 % (ref 0–2)
BILIRUB SERPL-MCNC: 0.4 MG/DL (ref 0–1.2)
BILIRUB UR QL STRIP: NEGATIVE
BNP SERPL-MCNC: 48 PG/ML (ref 0–125)
BUN SERPL-MCNC: 11 MG/DL (ref 6–20)
C PNEUM DNA NPH QL NAA+NON-PROBE: NOT DETECTED
CALCIUM SERPL-MCNC: 9.2 MG/DL (ref 8.6–10.2)
CHLORIDE SERPL-SCNC: 101 MMOL/L (ref 98–107)
CLARITY UR: CLEAR
CO2 SERPL-SCNC: 23 MMOL/L (ref 22–29)
COLOR UR: YELLOW
CREAT SERPL-MCNC: 0.7 MG/DL (ref 0.5–1)
D DIMER: <200 NG/ML DDU (ref 0–232)
EKG ATRIAL RATE: 61 BPM
EKG P AXIS: 44 DEGREES
EKG P-R INTERVAL: 144 MS
EKG Q-T INTERVAL: 412 MS
EKG QRS DURATION: 84 MS
EKG QTC CALCULATION (BAZETT): 414 MS
EKG R AXIS: 22 DEGREES
EKG T AXIS: 37 DEGREES
EKG VENTRICULAR RATE: 61 BPM
EOSINOPHIL # BLD: 0.15 K/UL (ref 0.05–0.5)
EOSINOPHILS RELATIVE PERCENT: 2 % (ref 0–6)
EPI CELLS #/AREA URNS HPF: ABNORMAL /HPF
ERYTHROCYTE [DISTWIDTH] IN BLOOD BY AUTOMATED COUNT: 12.4 % (ref 11.5–15)
FLUAV RNA NPH QL NAA+NON-PROBE: NOT DETECTED
FLUBV RNA NPH QL NAA+NON-PROBE: NOT DETECTED
GFR SERPL CREATININE-BSD FRML MDRD: >60 ML/MIN/1.73M2
GLUCOSE SERPL-MCNC: 94 MG/DL (ref 74–99)
GLUCOSE UR STRIP-MCNC: NEGATIVE MG/DL
HADV DNA NPH QL NAA+NON-PROBE: NOT DETECTED
HCG, URINE, POC: NEGATIVE
HCOV 229E RNA NPH QL NAA+NON-PROBE: NOT DETECTED
HCOV HKU1 RNA NPH QL NAA+NON-PROBE: NOT DETECTED
HCOV NL63 RNA NPH QL NAA+NON-PROBE: NOT DETECTED
HCOV OC43 RNA NPH QL NAA+NON-PROBE: NOT DETECTED
HCT VFR BLD AUTO: 37.7 % (ref 34–48)
HGB BLD-MCNC: 12 G/DL (ref 11.5–15.5)
HGB UR QL STRIP.AUTO: ABNORMAL
HMPV RNA NPH QL NAA+NON-PROBE: NOT DETECTED
HPIV1 RNA NPH QL NAA+NON-PROBE: NOT DETECTED
HPIV2 RNA NPH QL NAA+NON-PROBE: NOT DETECTED
HPIV3 RNA NPH QL NAA+NON-PROBE: NOT DETECTED
HPIV4 RNA NPH QL NAA+NON-PROBE: NOT DETECTED
IMM GRANULOCYTES # BLD AUTO: 0.04 K/UL (ref 0–0.58)
IMM GRANULOCYTES NFR BLD: 0 % (ref 0–5)
KETONES UR STRIP-MCNC: NEGATIVE MG/DL
LEUKOCYTE ESTERASE UR QL STRIP: NEGATIVE
LYMPHOCYTES NFR BLD: 3.64 K/UL (ref 1.5–4)
LYMPHOCYTES RELATIVE PERCENT: 40 % (ref 20–42)
Lab: NORMAL
M PNEUMO DNA NPH QL NAA+NON-PROBE: NOT DETECTED
MCH RBC QN AUTO: 29.3 PG (ref 26–35)
MCHC RBC AUTO-ENTMCNC: 31.8 G/DL (ref 32–34.5)
MCV RBC AUTO: 92 FL (ref 80–99.9)
MONOCYTES NFR BLD: 0.71 K/UL (ref 0.1–0.95)
MONOCYTES NFR BLD: 8 % (ref 2–12)
NEGATIVE QC PASS/FAIL: NORMAL
NEUTROPHILS NFR BLD: 49 % (ref 43–80)
NEUTS SEG NFR BLD: 4.43 K/UL (ref 1.8–7.3)
NITRITE UR QL STRIP: NEGATIVE
PH UR STRIP: 6 [PH] (ref 5–9)
PLATELET # BLD AUTO: 378 K/UL (ref 130–450)
PMV BLD AUTO: 9.4 FL (ref 7–12)
POSITIVE QC PASS/FAIL: NORMAL
POTASSIUM SERPL-SCNC: 3.6 MMOL/L (ref 3.5–5)
PROT SERPL-MCNC: 6.9 G/DL (ref 6.4–8.3)
PROT UR STRIP-MCNC: NEGATIVE MG/DL
RBC # BLD AUTO: 4.1 M/UL (ref 3.5–5.5)
RBC #/AREA URNS HPF: ABNORMAL /HPF
RSV RNA NPH QL NAA+NON-PROBE: NOT DETECTED
RV+EV RNA NPH QL NAA+NON-PROBE: NOT DETECTED
SARS-COV-2 RNA NPH QL NAA+NON-PROBE: NOT DETECTED
SODIUM SERPL-SCNC: 138 MMOL/L (ref 132–146)
SP GR UR STRIP: >1.03 (ref 1–1.03)
SPECIMEN DESCRIPTION: NORMAL
TROPONIN I SERPL HS-MCNC: <6 NG/L (ref 0–9)
UROBILINOGEN UR STRIP-ACNC: 0.2 EU/DL (ref 0–1)
WBC #/AREA URNS HPF: ABNORMAL /HPF
WBC OTHER # BLD: 9 K/UL (ref 4.5–11.5)

## 2024-01-05 PROCEDURE — 80053 COMPREHEN METABOLIC PANEL: CPT

## 2024-01-05 PROCEDURE — 6370000000 HC RX 637 (ALT 250 FOR IP): Performed by: EMERGENCY MEDICINE

## 2024-01-05 PROCEDURE — 6360000002 HC RX W HCPCS: Performed by: EMERGENCY MEDICINE

## 2024-01-05 PROCEDURE — 0202U NFCT DS 22 TRGT SARS-COV-2: CPT

## 2024-01-05 PROCEDURE — 85379 FIBRIN DEGRADATION QUANT: CPT

## 2024-01-05 PROCEDURE — 96372 THER/PROPH/DIAG INJ SC/IM: CPT

## 2024-01-05 PROCEDURE — 85025 COMPLETE CBC W/AUTO DIFF WBC: CPT

## 2024-01-05 PROCEDURE — 81001 URINALYSIS AUTO W/SCOPE: CPT

## 2024-01-05 PROCEDURE — 84484 ASSAY OF TROPONIN QUANT: CPT

## 2024-01-05 PROCEDURE — 71046 X-RAY EXAM CHEST 2 VIEWS: CPT

## 2024-01-05 PROCEDURE — 93010 ELECTROCARDIOGRAM REPORT: CPT | Performed by: INTERNAL MEDICINE

## 2024-01-05 PROCEDURE — 94664 DEMO&/EVAL PT USE INHALER: CPT

## 2024-01-05 PROCEDURE — 83880 ASSAY OF NATRIURETIC PEPTIDE: CPT

## 2024-01-05 PROCEDURE — 93005 ELECTROCARDIOGRAM TRACING: CPT | Performed by: EMERGENCY MEDICINE

## 2024-01-05 PROCEDURE — 99285 EMERGENCY DEPT VISIT HI MDM: CPT

## 2024-01-05 RX ORDER — IPRATROPIUM BROMIDE AND ALBUTEROL SULFATE 2.5; .5 MG/3ML; MG/3ML
1 SOLUTION RESPIRATORY (INHALATION) ONCE
Status: COMPLETED | OUTPATIENT
Start: 2024-01-05 | End: 2024-01-05

## 2024-01-05 RX ORDER — KETOROLAC TROMETHAMINE 30 MG/ML
30 INJECTION, SOLUTION INTRAMUSCULAR; INTRAVENOUS ONCE
Status: COMPLETED | OUTPATIENT
Start: 2024-01-05 | End: 2024-01-05

## 2024-01-05 RX ORDER — PREDNISONE 20 MG/1
60 TABLET ORAL ONCE
Status: COMPLETED | OUTPATIENT
Start: 2024-01-05 | End: 2024-01-05

## 2024-01-05 RX ADMIN — PREDNISONE 60 MG: 20 TABLET ORAL at 02:16

## 2024-01-05 RX ADMIN — IPRATROPIUM BROMIDE AND ALBUTEROL SULFATE 1 DOSE: .5; 2.5 SOLUTION RESPIRATORY (INHALATION) at 02:24

## 2024-01-05 RX ADMIN — KETOROLAC TROMETHAMINE 30 MG: 30 INJECTION, SOLUTION INTRAMUSCULAR; INTRAVENOUS at 02:17

## 2024-01-05 ASSESSMENT — PAIN DESCRIPTION - ORIENTATION: ORIENTATION: RIGHT

## 2024-01-05 ASSESSMENT — PAIN SCALES - GENERAL: PAINLEVEL_OUTOF10: 8

## 2024-01-05 ASSESSMENT — PAIN DESCRIPTION - PAIN TYPE: TYPE: ACUTE PAIN

## 2024-01-05 ASSESSMENT — PAIN DESCRIPTION - LOCATION: LOCATION: CHEST;BACK

## 2024-01-05 ASSESSMENT — PAIN DESCRIPTION - DESCRIPTORS: DESCRIPTORS: STABBING

## 2024-01-05 ASSESSMENT — PAIN DESCRIPTION - FREQUENCY: FREQUENCY: CONTINUOUS

## 2024-01-05 ASSESSMENT — PAIN - FUNCTIONAL ASSESSMENT: PAIN_FUNCTIONAL_ASSESSMENT: 0-10

## 2024-01-05 NOTE — ED PROVIDER NOTES
[facility] EMERGENCY DEPARTMENT  EMERGENCY DEPARTMENT ENCOUNTER        Pt Name: Jesse Jack  MRN: [id number]  Birthdate 1985  Date of evaluation: 2024  Provider: Clayton Roe DO  PCP: No primary care provider on file.  Note Started: 2:08 AM EST 24    CHIEF COMPLAINT       Chief Complaint   Patient presents with    Chest Pain     Right sided and radiates to her right back. Worse with cough. Started yesterday    Headache       HISTORY OF PRESENT ILLNESS: 1 or more Elements   History From: Patient     Limitations to history : None    Jesse Jack is a 38 y.o. female who presents right-sided chest pain that radiates to her back as well as headache.  States symptoms began about 24 hours ago.  Complains of wheezing and nonproductive cough as well as bitemporal headache.  Denies trauma as well as injuries nausea vomiting.  She complains of stabbing sensation in her chest.  Denies personal or family history of venous thromboembolism.  Last menstrual period was last week on time and regular.  Took ibuprofen about 6 hours ago with some improvement but not relief of her pain.  States she has not been able to smoke cigarettes today because of the chest pain    Nursing Notes were all reviewed and agreed with or any disagreements were addressed in the HPI.      REVIEW OF EXTERNAL NOTE :       Family medicine note from 10/10/2023, was noted to have history of chronic neck and back pain, was seen evaluated by neurosurgery underwent MRI.    REVIEW OF SYSTEMS :           Positives and Pertinent negatives as per HPI.     SURGICAL HISTORY     Past Surgical History:   Procedure Laterality Date    BREAST SURGERY      Removal of cyst on left     SECTION      x4       CURRENTMEDICATIONS       Discharge Medication List as of 2024  6:01 AM        CONTINUE these medications which have NOT CHANGED    Details   busPIRone (BUSPAR) 15 MG tablet  Historical Med      lamoTRIgine (LAMICTAL) 25 MG tablet Historical Med      mirtazapine (REMERON) 30 MG tablet Historical Med      ARIPiprazole (ABILIFY) 30 MG tablet Historical Med      naproxen (NAPROSYN) 500 MG tablet Take 1 tablet by mouth 2 times daily as needed for Pain, Disp-14 tablet, R-0Normal      famotidine (PEPCID) 20 MG tablet Take 1 tablet by mouth 2 times daily for 20 days, Disp-40 tablet, R-0Normal      ondansetron (ZOFRAN-ODT) 4 MG disintegrating tablet Take 1 tablet by mouth 3 times daily as needed for Nausea or Vomiting, Disp-6 tablet, R-0Normal      gabapentin (NEURONTIN) 300 MG capsule Take 1 capsule by mouth 3 times daily for 90 days. Intended supply: 30 days, Disp-90 capsule, R-2Normal             ALLERGIES     Pcn [penicillins]    FAMILYHISTORY       Family History   Problem Relation Age of Onset    Diabetes Paternal Grandmother     Diabetes Maternal Grandmother     Diabetes Maternal Grandfather     Diabetes Father     Heart Attack Father         SOCIAL HISTORY       Social History     Tobacco Use    Smoking status: Every Day     Current packs/day: 1.00     Average packs/day: 1 pack/day for 16.0 years (16.0 ttl pk-yrs)     Types: Cigarettes     Passive exposure: Current    Smokeless tobacco: Never    Tobacco comments:     Pt states she used vapor   Vaping Use    Vaping Use: Former    Substances: Always   Substance Use Topics    Alcohol use: Yes     Comment: socially    Drug use: Yes     Types: Marijuana (Weed)       SCREENINGS        [place] Coma Scale  Eye Opening: Spontaneous  Best Verbal Response: Oriented  Best Motor Response: Obeys commands  [place]y Coma Scale Score: 15                CIWA Assessment  BP: 132/80  Pulse: 58           PHYSICAL EXAM  1 or more Elements     ED Triage Vitals [01/05/24 0128]   BP Temp Temp Source Pulse Respirations SpO2 Height Weight - Scale   -- (!) 96.3 °F (35.7 °C) Tympanic 58 16 98 % 1.549 m (5' 1\") 77.1 kg (170 lb)                 Constitutional/General:

## 2024-01-05 NOTE — DISCHARGE INSTR - COC
Continuity of Care Form    Patient Name: Jesse Jack   :  1985  MRN:  [id number]    Admit date:  2024  Discharge date:  ***    Code Status Order: Prior   Advance Directives:     Admitting Physician:  No admitting provider for patient encounter.  PCP: No primary care provider on file.    Discharging Nurse: ***  Discharging Hospital Unit/Room#: ST01/ST-01  Discharging Unit Phone Number: ***    Emergency Contact:   Extended Emergency Contact Information  Primary Emergency Contact: Onel Ramirez  Home Phone: [phone]  Mobile Phone: [phone]  Relation: Child  Secondary Emergency Contact: MARIVEL WHATLEY  Mobile Phone: [phone]  Relation: Boyfriend  Preferred language: English   needed? No    Past Surgical History:  Past Surgical History:   Procedure Laterality Date    BREAST SURGERY      Removal of cyst on left     SECTION      x4       Immunization History:   Immunization History   Administered Date(s) Administered    TDaP, ADACEL (age 10y-64y), BOOSTRIX (age 10y+), IM, 0.5mL 2016, 2021       Active Problems:  Patient Active Problem List   Diagnosis Code    Drug fetal damage suspected in pregnancy O35.5XX0    Mental disorder complicating pregnancy O99.340    Previous operation to cervix affecting pregnancy in third trimester O34.43    Previous  delivery, antepartum condition or complication O34.219    Previous  delivery in second trimester, antepartum O09.212    Tobacco use affecting pregnancy in second trimester, antepartum O99.332    Medical non-compliance Z91.199    Abdominal cramping affecting pregnancy, antepartum O26.899, R10.9    Acute alcoholic intoxication without complication (HCC) F10.920    Pregnancy Z34.90    33 weeks gestation of pregnancy Z3A.33    Intrauterine pregnancy Z34.90    35 weeks gestation of pregnancy Z3A.35    Fetal arrhythmia affecting pregnancy, antepartum O36.8390    Poor fetal growth affecting management of mother in  Concerns:     { [facility]NA Safety Concerns:775198002}    Impairments/Disabilities:      { YOVAAN Impairments/Disabilities:788902509}    Nutrition Therapy:  Current Nutrition Therapy:   { [facility] Diet List:237552792}    Routes of Feeding: {[facility] DME Other Feedings:816684712}  Liquids: {Slp liquid thickness:65179}  Daily Fluid Restriction: {[facility] DME Yes amt example:546104247}  Last Modified Barium Swallow with Video (Video Swallowing Test): {Done Not Done Date:}    Treatments at the Time of Hospital Discharge:   Respiratory Treatments: ***  Oxygen Therapy:  {Therapy; copd oxygen:79198}  Ventilator:    {[facility] Vent List:379130008}    Rehab Therapies: {THERAPEUTIC INTERVENTION:9329571062}  Weight Bearing Status/Restrictions: {[facility] Weight Bearin}  Other Medical Equipment (for information only, NOT a DME order):  {EQUIPMENT:380729688}  Other Treatments: ***    Patient's personal belongings (please select all that are sent with patient):  {[facility] DME Belongings:671863694}    RN SIGNATURE:  {Esignature:225500116}    CASE MANAGEMENT/SOCIAL WORK SECTION    Inpatient Status Date: ***    Readmission Risk Assessment Score:  Readmission Risk              Risk of Unplanned Readmission:  0           Discharging to Facility/ Agency   Name:   Address:  Phone:  Fax:    Dialysis Facility (if applicable)   Name:  Address:  Dialysis Schedule:  Phone:  Fax:    / signature: {Esignature:601861387}    PHYSICIAN SECTION    Prognosis: {Prognosis:3432714214}    Condition at Discharge: { Patient Condition:727651055}    Rehab Potential (if transferring to Rehab): {Prognosis:3678492215}    Recommended Labs or Other Treatments After Discharge: ***    Physician Certification: I certify the above information and transfer of Jesse Jack  is necessary for the continuing treatment of the diagnosis listed and that she requires {Admit to Appropriate Level of Care:39532} for {GREATER/LESS:436942428} 30 days.

## 2024-02-02 ENCOUNTER — OFFICE VISIT (OUTPATIENT)
Dept: FAMILY MEDICINE CLINIC | Age: 39
End: 2024-02-02
Payer: COMMERCIAL

## 2024-02-02 ENCOUNTER — TELEPHONE (OUTPATIENT)
Dept: BREAST CENTER | Age: 39
End: 2024-02-02

## 2024-02-02 VITALS
BODY MASS INDEX: 32.99 KG/M2 | DIASTOLIC BLOOD PRESSURE: 74 MMHG | OXYGEN SATURATION: 99 % | SYSTOLIC BLOOD PRESSURE: 110 MMHG | TEMPERATURE: 98.6 F | HEART RATE: 87 BPM | WEIGHT: 174.6 LBS

## 2024-02-02 DIAGNOSIS — N61.0 CELLULITIS OF FEMALE BREAST: Primary | ICD-10-CM

## 2024-02-02 PROCEDURE — G8484 FLU IMMUNIZE NO ADMIN: HCPCS

## 2024-02-02 PROCEDURE — G8427 DOCREV CUR MEDS BY ELIG CLIN: HCPCS

## 2024-02-02 PROCEDURE — 99213 OFFICE O/P EST LOW 20 MIN: CPT

## 2024-02-02 PROCEDURE — G8417 CALC BMI ABV UP PARAM F/U: HCPCS

## 2024-02-02 PROCEDURE — 4004F PT TOBACCO SCREEN RCVD TLK: CPT

## 2024-02-02 RX ORDER — SULFAMETHOXAZOLE AND TRIMETHOPRIM 800; 160 MG/1; MG/1
TABLET ORAL
COMMUNITY
Start: 2024-01-23

## 2024-02-02 RX ORDER — OXYCODONE HYDROCHLORIDE AND ACETAMINOPHEN 5; 325 MG/1; MG/1
1 TABLET ORAL EVERY 6 HOURS PRN
Qty: 28 TABLET | Refills: 0 | Status: SHIPPED | OUTPATIENT
Start: 2024-02-02 | End: 2024-02-04 | Stop reason: HOSPADM

## 2024-02-02 RX ORDER — CEPHALEXIN 500 MG/1
500 CAPSULE ORAL 4 TIMES DAILY
Qty: 28 CAPSULE | Refills: 0 | Status: SHIPPED | OUTPATIENT
Start: 2024-02-02 | End: 2024-02-09

## 2024-02-02 ASSESSMENT — ENCOUNTER SYMPTOMS
COLOR CHANGE: 1
VOMITING: 0
SHORTNESS OF BREATH: 0
SORE THROAT: 0
ABDOMINAL PAIN: 0
APNEA: 0
COUGH: 0

## 2024-02-02 NOTE — TELEPHONE ENCOUNTER
Received call from Luanne at [facility]. Patient presented with left breast cellulitis with redness and pain. Patient had been on antibiotics for 10 days (Bactrim) with no improvement. Another antibiotic will be started by Colleen Machado CNP. Spoke with Dr Corley and he asked that patient continue on antibiotic as ordered. We scheduled the patient for a clinical follow up on 2/9/24 at 0900. Relayed to Luanne to let the patient know to go to the ED for worsening symptoms prior to her appointment in the breast clinic.    Electronically signed by Nathaly Masters RN on 2/2/24 at 1:46 PM EST

## 2024-02-02 NOTE — PROGRESS NOTES
Chief Complaint:   Abscess (2abscess on left breast and right thigh, currently on antibiotic but it is not helping)      History of Present Illness   HPI:  Jesse Jack is a 38 y.o. female who presents to walk-in today for cellulitis with drainage of the left breast.  She currently is on Bactrim that was prescribed to her by another clinic and states that is not getting any better.  She has been on it for 7 days and has 3 days left.  She denies any fevers, chills, shortness of breath or chest pain.  She also has a abscess to the left upper inner thigh.    Prior to Visit Medications    Medication Sig Taking? Authorizing Provider   sulfamethoxazole-trimethoprim (BACTRIM DS;SEPTRA DS) 800-160 MG per tablet  Yes Provider, MD Heath   busPIRone (BUSPAR) 15 MG tablet   ProviderHeath MD   lamoTRIgine (LAMICTAL) 25 MG tablet   ProviderHeath MD   mirtazapine (REMERON) 30 MG tablet   ProviderHeath MD   ARIPiprazole (ABILIFY) 30 MG tablet   ProviderHeath MD   naproxen (NAPROSYN) 500 MG tablet Take 1 tablet by mouth 2 times daily as needed for Pain  Patient not taking: Reported on 2/2/2024  Anay Pineda PA-C   famotidine (PEPCID) 20 MG tablet Take 1 tablet by mouth 2 times daily for 20 days  Kevin Real DO   ondansetron (ZOFRAN-ODT) 4 MG disintegrating tablet Take 1 tablet by mouth 3 times daily as needed for Nausea or Vomiting  Patient not taking: Reported on 2/2/2024  Kevin Real DO   gabapentin (NEURONTIN) 300 MG capsule Take 1 capsule by mouth 3 times daily for 90 days. Intended supply: 30 days  Wanda Jain PA-C       Review of Systems   Review of Systems   Constitutional:  Negative for activity change, appetite change and fever.   HENT:  Negative for congestion and sore throat.    Respiratory:  Negative for apnea, cough and shortness of breath.    Cardiovascular:  Negative for chest pain.   Gastrointestinal:  Negative for abdominal pain and vomiting.

## 2024-02-03 ENCOUNTER — HOSPITAL ENCOUNTER (EMERGENCY)
Age: 39
Discharge: HOME | End: 2024-02-04
Payer: COMMERCIAL

## 2024-02-03 ENCOUNTER — APPOINTMENT (OUTPATIENT)
Dept: ULTRASOUND IMAGING | Age: 39
End: 2024-02-03
Payer: COMMERCIAL

## 2024-02-03 DIAGNOSIS — N61.1 BREAST ABSCESS: Primary | ICD-10-CM

## 2024-02-03 LAB
ALBUMIN SERPL-MCNC: 4 G/DL (ref 3.5–5.2)
ALP SERPL-CCNC: 94 U/L (ref 35–104)
ALT SERPL-CCNC: 13 U/L (ref 0–32)
ANION GAP SERPL CALCULATED.3IONS-SCNC: 11 MMOL/L (ref 7–16)
AST SERPL-CCNC: 16 U/L (ref 0–31)
BASOPHILS # BLD: 0.04 K/UL (ref 0–0.2)
BASOPHILS NFR BLD: 0 % (ref 0–2)
BILIRUB SERPL-MCNC: <0.2 MG/DL (ref 0–1.2)
BUN SERPL-MCNC: 10 MG/DL (ref 6–20)
CALCIUM SERPL-MCNC: 9.5 MG/DL (ref 8.6–10.2)
CHLORIDE SERPL-SCNC: 98 MMOL/L (ref 98–107)
CO2 SERPL-SCNC: 23 MMOL/L (ref 22–29)
CREAT SERPL-MCNC: 0.7 MG/DL (ref 0.5–1)
EOSINOPHIL # BLD: 0.09 K/UL (ref 0.05–0.5)
EOSINOPHILS RELATIVE PERCENT: 1 % (ref 0–6)
ERYTHROCYTE [DISTWIDTH] IN BLOOD BY AUTOMATED COUNT: 12.9 % (ref 11.5–15)
GFR SERPL CREATININE-BSD FRML MDRD: >60 ML/MIN/1.73M2
GLUCOSE SERPL-MCNC: 105 MG/DL (ref 74–99)
HCG, URINE, POC: NEGATIVE
HCT VFR BLD AUTO: 36.9 % (ref 34–48)
HGB BLD-MCNC: 11.8 G/DL (ref 11.5–15.5)
IMM GRANULOCYTES # BLD AUTO: 0.05 K/UL (ref 0–0.58)
IMM GRANULOCYTES NFR BLD: 1 % (ref 0–5)
LACTATE BLDV-SCNC: 0.9 MMOL/L (ref 0.5–2.2)
LYMPHOCYTES NFR BLD: 1.73 K/UL (ref 1.5–4)
LYMPHOCYTES RELATIVE PERCENT: 16 % (ref 20–42)
Lab: NORMAL
MCH RBC QN AUTO: 29.4 PG (ref 26–35)
MCHC RBC AUTO-ENTMCNC: 32 G/DL (ref 32–34.5)
MCV RBC AUTO: 92 FL (ref 80–99.9)
MONOCYTES NFR BLD: 0.77 K/UL (ref 0.1–0.95)
MONOCYTES NFR BLD: 7 % (ref 2–12)
NEGATIVE QC PASS/FAIL: NORMAL
NEUTROPHILS NFR BLD: 75 % (ref 43–80)
NEUTS SEG NFR BLD: 7.9 K/UL (ref 1.8–7.3)
PLATELET # BLD AUTO: 422 K/UL (ref 130–450)
PMV BLD AUTO: 9.3 FL (ref 7–12)
POSITIVE QC PASS/FAIL: NORMAL
POTASSIUM SERPL-SCNC: 4.4 MMOL/L (ref 3.5–5)
PROT SERPL-MCNC: 7.4 G/DL (ref 6.4–8.3)
RBC # BLD AUTO: 4.01 M/UL (ref 3.5–5.5)
SODIUM SERPL-SCNC: 132 MMOL/L (ref 132–146)
WBC OTHER # BLD: 10.6 K/UL (ref 4.5–11.5)

## 2024-02-03 PROCEDURE — 6360000002 HC RX W HCPCS: Performed by: NURSE PRACTITIONER

## 2024-02-03 PROCEDURE — 76642 ULTRASOUND BREAST LIMITED: CPT

## 2024-02-03 PROCEDURE — 83605 ASSAY OF LACTIC ACID: CPT

## 2024-02-03 PROCEDURE — 85025 COMPLETE CBC W/AUTO DIFF WBC: CPT

## 2024-02-03 PROCEDURE — 99284 EMERGENCY DEPT VISIT MOD MDM: CPT

## 2024-02-03 PROCEDURE — 80053 COMPREHEN METABOLIC PANEL: CPT

## 2024-02-03 PROCEDURE — 10060 I&D ABSCESS SIMPLE/SINGLE: CPT

## 2024-02-03 PROCEDURE — 96374 THER/PROPH/DIAG INJ IV PUSH: CPT

## 2024-02-03 PROCEDURE — 2580000003 HC RX 258: Performed by: NURSE PRACTITIONER

## 2024-02-03 PROCEDURE — 96375 TX/PRO/DX INJ NEW DRUG ADDON: CPT

## 2024-02-03 PROCEDURE — 6360000002 HC RX W HCPCS

## 2024-02-03 PROCEDURE — 96376 TX/PRO/DX INJ SAME DRUG ADON: CPT

## 2024-02-03 RX ORDER — FENTANYL CITRATE 50 UG/ML
50 INJECTION, SOLUTION INTRAMUSCULAR; INTRAVENOUS
Status: COMPLETED | OUTPATIENT
Start: 2024-02-03 | End: 2024-02-03

## 2024-02-03 RX ORDER — KETOROLAC TROMETHAMINE 30 MG/ML
15 INJECTION, SOLUTION INTRAMUSCULAR; INTRAVENOUS ONCE
Status: COMPLETED | OUTPATIENT
Start: 2024-02-03 | End: 2024-02-03

## 2024-02-03 RX ORDER — 0.9 % SODIUM CHLORIDE 0.9 %
1000 INTRAVENOUS SOLUTION INTRAVENOUS ONCE
Status: COMPLETED | OUTPATIENT
Start: 2024-02-03 | End: 2024-02-04

## 2024-02-03 RX ORDER — LIDOCAINE HYDROCHLORIDE AND EPINEPHRINE 10; 10 MG/ML; UG/ML
20 INJECTION, SOLUTION INFILTRATION; PERINEURAL ONCE
Status: COMPLETED | OUTPATIENT
Start: 2024-02-03 | End: 2024-02-04

## 2024-02-03 RX ADMIN — KETOROLAC TROMETHAMINE 15 MG: 30 INJECTION, SOLUTION INTRAMUSCULAR; INTRAVENOUS at 20:06

## 2024-02-03 RX ADMIN — SODIUM CHLORIDE 1000 ML: 9 INJECTION, SOLUTION INTRAVENOUS at 20:06

## 2024-02-03 RX ADMIN — FENTANYL CITRATE 50 MCG: 50 INJECTION INTRAMUSCULAR; INTRAVENOUS at 23:33

## 2024-02-03 ASSESSMENT — PAIN DESCRIPTION - LOCATION
LOCATION: BREAST
LOCATION: BREAST

## 2024-02-03 ASSESSMENT — PAIN SCALES - GENERAL
PAINLEVEL_OUTOF10: 10
PAINLEVEL_OUTOF10: 10

## 2024-02-03 ASSESSMENT — PAIN DESCRIPTION - ORIENTATION
ORIENTATION: LEFT
ORIENTATION: LEFT

## 2024-02-03 ASSESSMENT — PAIN - FUNCTIONAL ASSESSMENT: PAIN_FUNCTIONAL_ASSESSMENT: 0-10

## 2024-02-03 ASSESSMENT — PAIN DESCRIPTION - DESCRIPTORS
DESCRIPTORS: SHARP;THROBBING
DESCRIPTORS: THROBBING

## 2024-02-04 VITALS
TEMPERATURE: 98.1 F | DIASTOLIC BLOOD PRESSURE: 85 MMHG | OXYGEN SATURATION: 96 % | SYSTOLIC BLOOD PRESSURE: 118 MMHG | HEART RATE: 70 BPM | RESPIRATION RATE: 22 BRPM

## 2024-02-04 PROBLEM — N61.1 BREAST ABSCESS: Status: ACTIVE | Noted: 2024-02-04

## 2024-02-04 PROCEDURE — 87205 SMEAR GRAM STAIN: CPT

## 2024-02-04 PROCEDURE — 96376 TX/PRO/DX INJ SAME DRUG ADON: CPT

## 2024-02-04 PROCEDURE — 6370000000 HC RX 637 (ALT 250 FOR IP)

## 2024-02-04 PROCEDURE — 6360000002 HC RX W HCPCS

## 2024-02-04 PROCEDURE — 99283 EMERGENCY DEPT VISIT LOW MDM: CPT | Performed by: SURGERY

## 2024-02-04 PROCEDURE — 87070 CULTURE OTHR SPECIMN AEROBIC: CPT

## 2024-02-04 PROCEDURE — 19020 MASTOTOMY EXPL DRG ABSC DP: CPT | Performed by: SURGERY

## 2024-02-04 PROCEDURE — 87185 SC STD ENZYME DETCJ PER NZM: CPT

## 2024-02-04 PROCEDURE — 87075 CULTR BACTERIA EXCEPT BLOOD: CPT

## 2024-02-04 PROCEDURE — 2500000003 HC RX 250 WO HCPCS

## 2024-02-04 RX ORDER — FENTANYL CITRATE 50 UG/ML
25 INJECTION, SOLUTION INTRAMUSCULAR; INTRAVENOUS ONCE
Status: COMPLETED | OUTPATIENT
Start: 2024-02-04 | End: 2024-02-04

## 2024-02-04 RX ORDER — DOXYCYCLINE HYCLATE 100 MG/1
100 CAPSULE ORAL ONCE
Status: COMPLETED | OUTPATIENT
Start: 2024-02-04 | End: 2024-02-04

## 2024-02-04 RX ORDER — DOXYCYCLINE HYCLATE 100 MG
100 TABLET ORAL 2 TIMES DAILY
Qty: 20 TABLET | Refills: 0 | Status: SHIPPED | OUTPATIENT
Start: 2024-02-04 | End: 2024-02-14

## 2024-02-04 RX ORDER — OXYCODONE HYDROCHLORIDE AND ACETAMINOPHEN 5; 325 MG/1; MG/1
1 TABLET ORAL EVERY 6 HOURS PRN
Qty: 28 TABLET | Refills: 0 | Status: SHIPPED | OUTPATIENT
Start: 2024-02-04 | End: 2024-02-11

## 2024-02-04 RX ADMIN — DOXYCYCLINE HYCLATE 100 MG: 100 CAPSULE ORAL at 00:33

## 2024-02-04 RX ADMIN — FENTANYL CITRATE 25 MCG: 50 INJECTION INTRAMUSCULAR; INTRAVENOUS at 00:33

## 2024-02-04 RX ADMIN — LIDOCAINE HYDROCHLORIDE AND EPINEPHRINE 20 ML: 10; 10 INJECTION, SOLUTION INFILTRATION; PERINEURAL at 00:27

## 2024-02-04 NOTE — ED PROVIDER NOTES
Independent LISA Visit.       [facility] EMERGENCY DEPARTMENT  ED  Encounter Note  Admit Date/RoomTime: 2/3/2024  9:50 PM  ED Room:   NAME: Jesse Jack  : 1985  MRN: [id number]  PCP: No primary care provider on file.    CHIEF COMPLAINT     Breast Pain (Patient c/o pain to L breast. + redness and swelling. Denies fevers.  + chills.)    HISTORY OF PRESENT ILLNESS        Jesse Jack is a 38 y.o. female who presents to the ED via private vehicle with left breast redness swelling and abscess.  States that she has an appointment with the breast surgeon scheduled for .  Her symptoms have been getting progressively worse.  She denies pregnancy or breast-feeding.  She reports that she was seen and started on Bactrim for cellulitis of the breast and after several doses of this, was not improving started on Keflex.  She reports that the area is increasing in size and is exquisitely tender.  She has no nipple discharge.  She has chills but no measured fever.  She is not having any vomiting.  No abdominal pain.  No chest pain or shortness of breath.  REVIEW OF SYSTEMS     Pertinent positives and negatives are stated within HPI, all other systems reviewed and are negative.    Past Medical History:  has a past medical history of Anxiety, Bipolar disorder (HCC), Depression, and Seizures (HCC).  Surgical History:  has a past surgical history that includes  section and Breast surgery.  Social History:  reports that she has been smoking cigarettes. She has a 16.0 pack-year smoking history. She has been exposed to tobacco smoke. She has never used smokeless tobacco. She reports current alcohol use. She reports current drug use. Drug: Marijuana (Weed).  Family History: family history includes Diabetes in her father, maternal grandfather, maternal grandmother, and paternal grandmother; Heart Attack in her father.   Allergies: Pcn [penicillins]  CURRENT

## 2024-02-04 NOTE — PROCEDURES
Incision and Drainage Procedure Note    Indication: left breast abscess     Procedure: The patient was positioned appropriately and the skin over the incision site was prepped with betadine and draped in a sterile fashion and draped in a sterile fashion. Local anesthesia was obtained by infiltration using 1% Lidocaine with epinephrine.  An incision was then made over the area of skin with neocrosis and thick, purulent material was expressed. Loculations were broken up using a hemostat and more of the material was able to be expressed. The drainage cavity was then irrigated copiously, packed with sterile gauze, and dressed with a bandage.    The patient tolerated the procedure with difficulty.    Complications: None    Dr. Gilmore was present for the procedure

## 2024-02-04 NOTE — DISCHARGE INSTRUCTIONS
Remove 1/2 cm of packing each day (cut off 1/2cm) until packing completely removed.   Keep appointment time at [facility] on 2/9   Take doxycycline and keflex as prescribed   Take pain medications for severe only as needed every 6 hours  Take tylenol and ibuprofen for mild-moderate pain

## 2024-02-04 NOTE — CONSULTS
GENERAL SURGERY  CONSULT NOTE  2/3/2024    Physician Consulted: Dr. Gilmore   Reason for Consult: left breast abscess   Referring Physician: Emma Biswas Earle Jack is a 38 y.o. female  who presents for evaluation of left breast cellulitis and drainage. General surgery was consulted for evaluation and further management. She reports worsening left breast pain and swelling over the last 2 weeks. She states she was seeing her PCP who had prescribed bactrim and she had completed 12 days of it without improvement and was seen at the [facility] urgent care  who prescribed Keflex. She denies any fever or chills but is starting to have nausea and generalized malaise. She reports significant pain and discomfort without relief with pain medications. She currently denies any nipple discharge but states she has had nipple discharge in the past. She states she had a nipple piercing multiple years ago which has since been removed. She states she has cut down on tobacco use and is down to 1-2 cigarettes/day but is still smoking marijuana. No prior breast imaging. Hx of breast cyst removal multiple years ago on the left breast.    Of note, fairly significant history of breast cancer on her maternal side. She states her great grandmother passed of breast cancer in her 40s and has an aunt who passed in her 30s from breast cancer. Living great aunt with history of breast cancer. She does not know if anyone has had genetic testing. Family hx of lung cancer but no known hx of colon, ovarian, pancreatic or uterine cancer.      HR 110s. BP WNL. WBC 10.6. US showing loculated 3.6 x 4.2 fluid collection.       Past Medical History:   Diagnosis Date    Anxiety     Bipolar disorder (HCC)     Depression     on multiple medications    Seizures (HCC) 2018       Past Surgical History:   Procedure Laterality Date    BREAST SURGERY      Removal of cyst on left     SECTION      x4       Medications Prior

## 2024-02-06 LAB
MICROORGANISM SPEC CULT: NO GROWTH
MICROORGANISM/AGENT SPEC: NORMAL
SPECIMEN DESCRIPTION: NORMAL

## 2024-02-07 LAB
MICROORGANISM SPEC CULT: ABNORMAL
MICROORGANISM SPEC CULT: ABNORMAL
SPECIMEN DESCRIPTION: ABNORMAL

## 2024-02-09 ENCOUNTER — OFFICE VISIT (OUTPATIENT)
Dept: BREAST CENTER | Age: 39
End: 2024-02-09
Payer: COMMERCIAL

## 2024-02-09 VITALS
HEIGHT: 61 IN | BODY MASS INDEX: 33.04 KG/M2 | WEIGHT: 175 LBS | TEMPERATURE: 98.3 F | RESPIRATION RATE: 16 BRPM | OXYGEN SATURATION: 99 % | SYSTOLIC BLOOD PRESSURE: 122 MMHG | DIASTOLIC BLOOD PRESSURE: 80 MMHG | HEART RATE: 87 BPM

## 2024-02-09 DIAGNOSIS — N61.1 BREAST ABSCESS: Primary | ICD-10-CM

## 2024-02-09 PROCEDURE — 10060 I&D ABSCESS SIMPLE/SINGLE: CPT | Performed by: SURGERY

## 2024-02-09 PROCEDURE — 99214 OFFICE O/P EST MOD 30 MIN: CPT | Performed by: SURGERY

## 2024-02-09 RX ORDER — LIDOCAINE HYDROCHLORIDE AND EPINEPHRINE 10; 10 MG/ML; UG/ML
20 INJECTION, SOLUTION INFILTRATION; PERINEURAL ONCE
Status: COMPLETED | OUTPATIENT
Start: 2024-02-09 | End: 2024-02-09

## 2024-02-09 RX ADMIN — LIDOCAINE HYDROCHLORIDE AND EPINEPHRINE 5 ML: 10; 10 INJECTION, SOLUTION INFILTRATION; PERINEURAL at 10:10

## 2024-02-09 NOTE — PROGRESS NOTES
[facility]  PROGRESS NOTE  ATTENDING NOTE    Chief Complaint   Patient presents with    Consultation     LEFT BREAST CELLULITIS: went to walk in clinic Friday. breast painful and red, on antibiotics, second antibiotic Keflex started 2/2/24. went to ED 2/4/24: Dr Gilmore there and patient had I&D. has packing        S: 38-year-old female who presented this past weekend with a left breast abscess.  She went to a walk-in clinic a few days before and was placed on antibiotics.  We will formed an incision and drainage while she was in the emergency department add an additional antibiotic.  Her packing has since come out.  The wound is still occasionally draining.  She wants surgery on her breast but I have advised her she can have surgery because it still inflamed.  She is also complaining of a left thigh, inner medial thigh abscess.  She has had abscesses of the breast before the last was about 7 years ago.  She states she always feels like a mass that inflamed and then goes down.  She stopped smoking tobacco this week but is heavily smoking marijuana she smells of marijuana today.  She still taking her antibiotics.    /80 (Site: Right Upper Arm)   Pulse 87   Temp 98.3 °F (36.8 °C) (Temporal)   Resp 16   Ht 1.549 m (5' 1\")   Wt 79.4 kg (175 lb)   LMP 01/20/2024 (Approximate)   SpO2 99%   BMI 33.07 kg/m²   Gen:  NAD  Left breast: The wound is open and draining small mount of pus.  The abscess is greatly decreased.  She is having a small amount of nipple drainage that is purulent.  Left thigh: There are 2 openings there is induration.  There is no drainage at this time.    ASSESSMENT/PLAN:  Left breast abscess--status post incision and drainage  --Apply mupirocin to the wound twice a day if unable to afford the mupirocin apply Neosporin  --Continue warm compress  --Advised using Epsom salts to help draw at the infection and a warm bath  --Would like for the infection to decrease

## 2024-02-16 ENCOUNTER — OFFICE VISIT (OUTPATIENT)
Dept: BREAST CENTER | Age: 39
End: 2024-02-16
Payer: COMMERCIAL

## 2024-02-16 VITALS
DIASTOLIC BLOOD PRESSURE: 70 MMHG | WEIGHT: 173 LBS | OXYGEN SATURATION: 99 % | SYSTOLIC BLOOD PRESSURE: 128 MMHG | HEART RATE: 87 BPM | TEMPERATURE: 98 F | HEIGHT: 61 IN | BODY MASS INDEX: 32.66 KG/M2 | RESPIRATION RATE: 20 BRPM

## 2024-02-16 DIAGNOSIS — L02.419 THIGH ABSCESS: ICD-10-CM

## 2024-02-16 DIAGNOSIS — N61.1 BREAST ABSCESS OF FEMALE: Primary | ICD-10-CM

## 2024-02-16 PROCEDURE — 99213 OFFICE O/P EST LOW 20 MIN: CPT | Performed by: SURGERY

## 2024-02-16 RX ORDER — LEVOFLOXACIN 500 MG/1
500 TABLET, FILM COATED ORAL DAILY
Qty: 7 TABLET | Refills: 0 | Status: SHIPPED | OUTPATIENT
Start: 2024-02-16 | End: 2024-02-23

## 2024-02-16 RX ORDER — METRONIDAZOLE 500 MG/1
500 TABLET ORAL 3 TIMES DAILY
Qty: 21 TABLET | Refills: 0 | Status: SHIPPED | OUTPATIENT
Start: 2024-02-16 | End: 2024-02-23

## 2024-02-16 RX ORDER — DEXTROAMPHETAMINE SACCHARATE, AMPHETAMINE ASPARTATE, DEXTROAMPHETAMINE SULFATE AND AMPHETAMINE SULFATE 5; 5; 5; 5 MG/1; MG/1; MG/1; MG/1
20 TABLET ORAL DAILY
COMMUNITY

## 2024-02-16 NOTE — PROGRESS NOTES
[facility]  PROGRESS NOTE  ATTENDING NOTE    Chief Complaint   Patient presents with    Wound Check     Left breast and left thigh abscess -- wound check. Thigh better. Left breast still painful but slightly better     S: 38-year-old female presents for follow-up of her left medial thigh abscess and her left breast abscess.  She states the left medial thigh abscess is doing well and is almost healed.  She states the left breast is still causing her some pain.  She still has some discharge from the incision site and from the nipple.  She has been using the mupirocin.  She has cut down to 2 cigarettes a day but has not completely quit    I have reviewed her cultures from when this was lanced in the ER and she grew out anaerobic gram-negative rods beta-lactamase positive and anaerobic gram positive cocci beta-lactamase negative.  Both were light growth.    /70 (Site: Right Upper Arm)   Pulse 87   Temp 98 °F (36.7 °C)   Resp 20   Ht 1.549 m (5' 1\")   Wt 78.5 kg (173 lb)   LMP 01/20/2024 (Approximate)   SpO2 99%   BMI 32.69 kg/m²   Gen:  NAD  Left breast:  still some erythema, but much improved.  Small amount of purulent drainage from wound.  No obvious palpable abscess cavity.  No nipple discharge  Left thigh:  wound is clean and dry    ASSESSMENT/PLAN:  Left breast abscess  -- Will place her on Levaquin and Flagyl to finish 1 more course of antibiotics  -- Continue mupirocin  --Advised to use warm compress  --Strongly advised smoking cessation    Left thigh abscess--continue daily cleaning.  Okay to place Neosporin on the wound.    Return to clinic March 1    Kylah Gilmore MD, MSc, FACS  2/16/2024  11:48 AM

## 2024-03-01 ENCOUNTER — TELEPHONE (OUTPATIENT)
Dept: BREAST CENTER | Age: 39
End: 2024-03-01

## 2024-03-01 NOTE — TELEPHONE ENCOUNTER
JOSH Acevedo called patient regarding her missed appointment with Dr. Gilmore on 3/1/2024. Patient has been rescheduled for 3/15/24 @ 1pm. Patient verbalized new appointment date and time.       ,Electronically signed by Jeri Acevedo RN on 3/1/24 at 11:05 AM EST

## 2024-03-15 ENCOUNTER — TELEPHONE (OUTPATIENT)
Dept: BREAST CENTER | Age: 39
End: 2024-03-15

## 2024-03-15 ENCOUNTER — PREP FOR PROCEDURE (OUTPATIENT)
Dept: BREAST CENTER | Age: 39
End: 2024-03-15

## 2024-03-15 ENCOUNTER — OFFICE VISIT (OUTPATIENT)
Dept: BREAST CENTER | Age: 39
End: 2024-03-15
Payer: COMMERCIAL

## 2024-03-15 VITALS
WEIGHT: 175 LBS | OXYGEN SATURATION: 100 % | TEMPERATURE: 97 F | SYSTOLIC BLOOD PRESSURE: 124 MMHG | RESPIRATION RATE: 14 BRPM | BODY MASS INDEX: 32.2 KG/M2 | DIASTOLIC BLOOD PRESSURE: 78 MMHG | HEIGHT: 62 IN | HEART RATE: 87 BPM

## 2024-03-15 DIAGNOSIS — N60.12 CYSTIC DISEASE OF BREAST, LEFT: ICD-10-CM

## 2024-03-15 DIAGNOSIS — N61.1 LEFT BREAST ABSCESS: Primary | ICD-10-CM

## 2024-03-15 PROCEDURE — 99213 OFFICE O/P EST LOW 20 MIN: CPT | Performed by: SURGERY

## 2024-03-15 RX ORDER — LEVOFLOXACIN 500 MG/1
500 TABLET, FILM COATED ORAL DAILY
Qty: 10 TABLET | Refills: 0 | Status: SHIPPED | OUTPATIENT
Start: 2024-03-15 | End: 2024-03-25

## 2024-03-15 RX ORDER — METRONIDAZOLE 500 MG/1
500 TABLET ORAL 3 TIMES DAILY
Qty: 30 TABLET | Refills: 0 | Status: SHIPPED | OUTPATIENT
Start: 2024-03-15 | End: 2024-03-25

## 2024-03-15 NOTE — PROGRESS NOTES
[place] [facility]  PROGRESS NOTE  ATTENDING NOTE    Chief Complaint   Patient presents with    Wound Check     Left breast - wound check- patient states still getting drainage and has to wear pads. Patient states in a lot of pain. And usually happens a lot at night. Patient states drainage white or cream colored and thick. Patient states has redness and swelling.      S:  39y/o F who originally presented to the ED on 2/4/2024. She underwent I&D then.  She has been on 2 rounds of antibiotics.  Doxy/keflex and levaquin/flagyl.  She missed an appointment on 3/1.  She states the left breast is draining again. She states it gets hot.  She is tired of dealing with it and wants the breast removed.    /78 (Site: Right Upper Arm, Position: Sitting, Cuff Size: Medium Adult)   Pulse 87   Temp 97 °F (36.1 °C) (Temporal)   Resp 14   Ht 1.575 m (5' 2\")   Wt 79.4 kg (175 lb)   SpO2 100%   BMI 32.01 kg/m²   Physical Exam  Constitutional:       Appearance: Normal appearance. She is obese.   HENT:      Head: Normocephalic and atraumatic.      Nose: Nose normal.      Mouth/Throat:      Mouth: Mucous membranes are moist.      Pharynx: Oropharynx is clear.   Eyes:      Extraocular Movements: Extraocular movements intact.      Pupils: Pupils are equal, round, and reactive to light.   Cardiovascular:      Rate and Rhythm: Normal rate.      Pulses: Normal pulses.   Pulmonary:      Effort: Pulmonary effort is normal.   Chest:   Breasts:     Right: No swelling, bleeding, inverted nipple, mass, nipple discharge, skin change or tenderness.      Left: Mass, skin change and tenderness present. No swelling, bleeding, inverted nipple or nipple discharge.       Musculoskeletal:         General: No tenderness or signs of injury.      Cervical back: Normal range of motion.   Lymphadenopathy:      Upper Body:      Right upper body: No supraclavicular or axillary adenopathy.      Left upper body: No supraclavicular or

## 2024-03-15 NOTE — TELEPHONE ENCOUNTER
RN Jeri Acevedo scheduled patient for Left breast complex cyst excision on 3/26/2024 @ 7:30am with Dr. Gilmore. PT denies taking ASA/blood thinner products.  PT verbalized she understood prep instructions, and NPO after midnight. PT verbalized that she understood appointment date/time, as well as to arrive @ 6am. PT advised PAT will call to go over all medication and advise on where to park and enter the hospital at for procedure. PT has been told to make sure they have a ride to and from procedure as they are not allowed to drive after procedure. RN instructed PT to call the office at [phone] with any questions, comments, or concerns about the procedure.      RN scheduled patient post-op appointment for 3/26/2024 @ 7:30am in [facility] with .     Prior Authorization Form:      DEMOGRAPHICS:                     Patient Name:  Jesse Jack  Patient :  1985            Insurance:  Payor: [facility] / Plan: [facility] MEDICAID / Product Type: *No Product type* /   Insurance ID Number:    Payer/Plan Subscr  Sex Relation Sub. Ins. ID Effective Group Num   1. [facility] - * KENNETH JACK,Y* 1985 Female Self [id number] 10/1/14 [facility] [address]         DIAGNOSIS & PROCEDURE:                       Procedure/Operation: Left breast complex cyst exicison           CPT Code: 61330    Diagnosis:  left breast cyst    ICD10 Code: N60.12    Location:  [facility]    Surgeon:      SCHEDULING INFORMATION:                          Date: 3/26/2024    Time: 7:30am              Anesthesia:  General                                                       Status:  Outpatient        Special Comments:  N/A       Electronically signed by Jeri Acevedo RN on 3/15/2024 at 3:14 PM

## 2024-03-15 NOTE — H&P (VIEW-ONLY)
[place]  PROGRESS NOTE  ATTENDING NOTE    Chief Complaint   Patient presents with    Wound Check     Left breast - wound check- patient states still getting drainage and has to wear pads. Patient states in a lot of pain. And usually happens a lot at night. Patient states drainage white or cream colored and thick. Patient states has redness and swelling.      S:  39y/o F who originally presented to the ED on 2/4/2024. She underwent I&D then.  She has been on 2 rounds of antibiotics.  Doxy/keflex and levaquin/flagyl.  She missed an appointment on 3/1.  She states the left breast is draining again. She states it gets hot.  She is tired of dealing with it and wants the breast removed.    /78 (Site: Right Upper Arm, Position: Sitting, Cuff Size: Medium Adult)   Pulse 87   Temp 97 °F (36.1 °C) (Temporal)   Resp 14   Ht 1.575 m (5' 2\")   Wt 79.4 kg (175 lb)   SpO2 100%   BMI 32.01 kg/m²   Physical Exam  Constitutional:       Appearance: Normal appearance. She is obese.   HENT:      Head: Normocephalic and atraumatic.      Nose: Nose normal.      Mouth/Throat:      Mouth: Mucous membranes are moist.      Pharynx: Oropharynx is clear.   Eyes:      Extraocular Movements: Extraocular movements intact.      Pupils: Pupils are equal, round, and reactive to light.   Cardiovascular:      Rate and Rhythm: Normal rate.      Pulses: Normal pulses.   Pulmonary:      Effort: Pulmonary effort is normal.   Chest:   Breasts:     Right: No swelling, bleeding, inverted nipple, mass, nipple discharge, skin change or tenderness.      Left: Mass, skin change and tenderness present. No swelling, bleeding, inverted nipple or nipple discharge.       Musculoskeletal:         General: No tenderness or signs of injury.      Cervical back: Normal range of motion.   Lymphadenopathy:      Upper Body:      Right upper body: No supraclavicular or axillary adenopathy.      Left upper body: No supraclavicular or

## 2024-03-21 NOTE — PROGRESS NOTES
[facility]   PRE-ADMISSION TESTING GENERAL INSTRUCTIONS  PAT Phone Number: [phone]      GENERAL INSTRUCTIONS:    [x] Antibacterial Soap Shower Night before and/or AM of surgery.  [] CHG Wipes instruction sheet and wipes given.  [x] Do not wear makeup, lotions, powders, deodorant the morning of surgery.  [x] Nothing to eat or drink after midnight. This includes no gum, candy, mints or water.  [x] You may brush your teeth, gargle, but do not swallow water.   [x] No tobacco products, illegal drugs, or alcohol within 24 hours of your surgery.  [x] Jewelry or valuables should not be brought to the hospital. All body and/or tongue piercing's must be removed prior to arriving to hospital. No contact lens or hair pins.   [x] Arrange transportation with a responsible adult  to and from the hospital. Arrange for someone to be with you for the remainder of the day and for 24 hours after your procedure due to having had anesthesia.          -Who will be your  for transportation? Provide a ride        -Who will be staying with you for 24 hrs after your procedure? Son Jerzy Sykes  [x] Bring insurance card and photo ID.  [] Bring copy of living will or healthcare power of  papers to be placed in your electronic record.  [x] Urine Pregnancy test will be preformed the day of surgery. A specimen sample may be brought from home.  [] Transfusion (Green) Bracelet: Please bring with you to hospital, day of surgery.     PARKING INSTRUCTIONS:     [x] ARRIVAL DATE & TIME: 3/26 @ 0530  [x] Times are subject to change. We will contact you the business day before surgery if that were to occur.  [x] Enter into the [facility] Entrance. Two people may accompany you. Masks are not required.  [x] Parking Lot \"I\" is where you will park. It is located on the corner of [address] and [address]. The entrance is on [address].   Only one vehicle - per patient, is permitted in parking

## 2024-03-22 ENCOUNTER — ANESTHESIA EVENT (OUTPATIENT)
Dept: OPERATING ROOM | Age: 39
End: 2024-03-22
Payer: COMMERCIAL

## 2024-03-22 RX ORDER — SODIUM CHLORIDE 9 MG/ML
INJECTION, SOLUTION INTRAVENOUS CONTINUOUS
Status: CANCELLED | OUTPATIENT
Start: 2024-03-22

## 2024-03-22 RX ORDER — SODIUM CHLORIDE 0.9 % (FLUSH) 0.9 %
5-40 SYRINGE (ML) INJECTION PRN
Status: CANCELLED | OUTPATIENT
Start: 2024-03-22

## 2024-03-22 RX ORDER — SODIUM CHLORIDE 9 MG/ML
INJECTION, SOLUTION INTRAVENOUS PRN
Status: CANCELLED | OUTPATIENT
Start: 2024-03-22

## 2024-03-22 RX ORDER — SODIUM CHLORIDE 0.9 % (FLUSH) 0.9 %
5-40 SYRINGE (ML) INJECTION EVERY 12 HOURS SCHEDULED
Status: CANCELLED | OUTPATIENT
Start: 2024-03-22

## 2024-03-26 ENCOUNTER — ANESTHESIA (OUTPATIENT)
Dept: OPERATING ROOM | Age: 39
End: 2024-03-26
Payer: COMMERCIAL

## 2024-03-26 ENCOUNTER — HOSPITAL ENCOUNTER (OUTPATIENT)
Age: 39
Setting detail: OUTPATIENT SURGERY
Discharge: HOME | End: 2024-03-26
Attending: SURGERY | Admitting: SURGERY
Payer: COMMERCIAL

## 2024-03-26 VITALS
HEART RATE: 64 BPM | HEIGHT: 61 IN | TEMPERATURE: 97.7 F | BODY MASS INDEX: 33.04 KG/M2 | SYSTOLIC BLOOD PRESSURE: 113 MMHG | OXYGEN SATURATION: 100 % | WEIGHT: 175 LBS | RESPIRATION RATE: 17 BRPM | DIASTOLIC BLOOD PRESSURE: 76 MMHG

## 2024-03-26 DIAGNOSIS — N61.1 BREAST ABSCESS: ICD-10-CM

## 2024-03-26 DIAGNOSIS — Z01.812 PRE-OPERATIVE LABORATORY EXAMINATION: Primary | ICD-10-CM

## 2024-03-26 DIAGNOSIS — N60.12 CYSTIC DISEASE OF BREAST, LEFT: ICD-10-CM

## 2024-03-26 LAB
HCG, URINE, POC: NEGATIVE
Lab: NORMAL
NEGATIVE QC PASS/FAIL: NORMAL
POSITIVE QC PASS/FAIL: NORMAL

## 2024-03-26 PROCEDURE — 6370000000 HC RX 637 (ALT 250 FOR IP): Performed by: SURGERY

## 2024-03-26 PROCEDURE — 2500000003 HC RX 250 WO HCPCS: Performed by: ANESTHESIOLOGY

## 2024-03-26 PROCEDURE — 2709999900 HC NON-CHARGEABLE SUPPLY: Performed by: SURGERY

## 2024-03-26 PROCEDURE — 2720000010 HC SURG SUPPLY STERILE: Performed by: SURGERY

## 2024-03-26 PROCEDURE — 7100000001 HC PACU RECOVERY - ADDTL 15 MIN: Performed by: SURGERY

## 2024-03-26 PROCEDURE — 3700000000 HC ANESTHESIA ATTENDED CARE: Performed by: SURGERY

## 2024-03-26 PROCEDURE — 7100000011 HC PHASE II RECOVERY - ADDTL 15 MIN: Performed by: SURGERY

## 2024-03-26 PROCEDURE — 19120 REMOVAL OF BREAST LESION: CPT | Performed by: SURGERY

## 2024-03-26 PROCEDURE — 2580000003 HC RX 258: Performed by: SURGERY

## 2024-03-26 PROCEDURE — 3700000001 HC ADD 15 MINUTES (ANESTHESIA): Performed by: SURGERY

## 2024-03-26 PROCEDURE — 6360000002 HC RX W HCPCS

## 2024-03-26 PROCEDURE — 6360000002 HC RX W HCPCS: Performed by: SURGERY

## 2024-03-26 PROCEDURE — 2500000003 HC RX 250 WO HCPCS

## 2024-03-26 PROCEDURE — 7100000000 HC PACU RECOVERY - FIRST 15 MIN: Performed by: SURGERY

## 2024-03-26 PROCEDURE — 2500000003 HC RX 250 WO HCPCS: Performed by: SURGERY

## 2024-03-26 PROCEDURE — 7100000010 HC PHASE II RECOVERY - FIRST 15 MIN: Performed by: SURGERY

## 2024-03-26 PROCEDURE — 3600000002 HC SURGERY LEVEL 2 BASE: Performed by: SURGERY

## 2024-03-26 PROCEDURE — 88307 TISSUE EXAM BY PATHOLOGIST: CPT

## 2024-03-26 PROCEDURE — 6370000000 HC RX 637 (ALT 250 FOR IP): Performed by: ANESTHESIOLOGY

## 2024-03-26 PROCEDURE — 3600000012 HC SURGERY LEVEL 2 ADDTL 15MIN: Performed by: SURGERY

## 2024-03-26 RX ORDER — OXYCODONE HYDROCHLORIDE AND ACETAMINOPHEN 5; 325 MG/1; MG/1
1 TABLET ORAL EVERY 6 HOURS PRN
Qty: 12 TABLET | Refills: 0 | Status: SHIPPED | OUTPATIENT
Start: 2024-03-26 | End: 2024-03-29

## 2024-03-26 RX ORDER — PROPOFOL 10 MG/ML
INJECTION, EMULSION INTRAVENOUS PRN
Status: DISCONTINUED | OUTPATIENT
Start: 2024-03-26 | End: 2024-03-26 | Stop reason: SDUPTHER

## 2024-03-26 RX ORDER — HYDROMORPHONE HYDROCHLORIDE 2 MG/ML
INJECTION, SOLUTION INTRAMUSCULAR; INTRAVENOUS; SUBCUTANEOUS PRN
Status: DISCONTINUED | OUTPATIENT
Start: 2024-03-26 | End: 2024-03-26 | Stop reason: SDUPTHER

## 2024-03-26 RX ORDER — SODIUM CHLORIDE 0.9 % (FLUSH) 0.9 %
5-40 SYRINGE (ML) INJECTION PRN
Status: DISCONTINUED | OUTPATIENT
Start: 2024-03-26 | End: 2024-03-26 | Stop reason: HOSPADM

## 2024-03-26 RX ORDER — MEPERIDINE HYDROCHLORIDE 25 MG/ML
12.5 INJECTION INTRAMUSCULAR; INTRAVENOUS; SUBCUTANEOUS EVERY 5 MIN PRN
Status: DISCONTINUED | OUTPATIENT
Start: 2024-03-26 | End: 2024-03-26 | Stop reason: HOSPADM

## 2024-03-26 RX ORDER — BACITRACIN ZINC 500 [USP'U]/G
OINTMENT TOPICAL PRN
Status: DISCONTINUED | OUTPATIENT
Start: 2024-03-26 | End: 2024-03-26 | Stop reason: ALTCHOICE

## 2024-03-26 RX ORDER — FENTANYL CITRATE 50 UG/ML
INJECTION, SOLUTION INTRAMUSCULAR; INTRAVENOUS PRN
Status: DISCONTINUED | OUTPATIENT
Start: 2024-03-26 | End: 2024-03-26 | Stop reason: SDUPTHER

## 2024-03-26 RX ORDER — SODIUM CHLORIDE 0.9 % (FLUSH) 0.9 %
5-40 SYRINGE (ML) INJECTION EVERY 12 HOURS SCHEDULED
Status: DISCONTINUED | OUTPATIENT
Start: 2024-03-26 | End: 2024-03-26 | Stop reason: HOSPADM

## 2024-03-26 RX ORDER — HYDROMORPHONE HYDROCHLORIDE 1 MG/ML
0.5 INJECTION, SOLUTION INTRAMUSCULAR; INTRAVENOUS; SUBCUTANEOUS EVERY 5 MIN PRN
Status: DISCONTINUED | OUTPATIENT
Start: 2024-03-26 | End: 2024-03-26 | Stop reason: HOSPADM

## 2024-03-26 RX ORDER — ONDANSETRON 2 MG/ML
INJECTION INTRAMUSCULAR; INTRAVENOUS PRN
Status: DISCONTINUED | OUTPATIENT
Start: 2024-03-26 | End: 2024-03-26 | Stop reason: SDUPTHER

## 2024-03-26 RX ORDER — BUPIVACAINE HYDROCHLORIDE AND EPINEPHRINE 2.5; 5 MG/ML; UG/ML
INJECTION, SOLUTION EPIDURAL; INFILTRATION; INTRACAUDAL; PERINEURAL PRN
Status: DISCONTINUED | OUTPATIENT
Start: 2024-03-26 | End: 2024-03-26 | Stop reason: ALTCHOICE

## 2024-03-26 RX ORDER — LIDOCAINE HYDROCHLORIDE 20 MG/ML
INJECTION, SOLUTION INTRAVENOUS PRN
Status: DISCONTINUED | OUTPATIENT
Start: 2024-03-26 | End: 2024-03-26 | Stop reason: SDUPTHER

## 2024-03-26 RX ORDER — SODIUM CHLORIDE 9 MG/ML
INJECTION, SOLUTION INTRAVENOUS CONTINUOUS
Status: DISCONTINUED | OUTPATIENT
Start: 2024-03-26 | End: 2024-03-26 | Stop reason: HOSPADM

## 2024-03-26 RX ORDER — VANCOMYCIN HYDROCHLORIDE 1 G/20ML
INJECTION, POWDER, LYOPHILIZED, FOR SOLUTION INTRAVENOUS PRN
Status: DISCONTINUED | OUTPATIENT
Start: 2024-03-26 | End: 2024-03-26 | Stop reason: SDUPTHER

## 2024-03-26 RX ORDER — HYDROMORPHONE HYDROCHLORIDE 1 MG/ML
0.5 INJECTION, SOLUTION INTRAMUSCULAR; INTRAVENOUS; SUBCUTANEOUS EVERY 5 MIN PRN
Status: COMPLETED | OUTPATIENT
Start: 2024-03-26 | End: 2024-03-26

## 2024-03-26 RX ORDER — BACITRACIN ZINC AND POLYMYXIN B SULFATE 500; 1000 [USP'U]/G; [USP'U]/G
OINTMENT TOPICAL
Qty: 28.4 G | Refills: 1 | Status: SHIPPED | OUTPATIENT
Start: 2024-03-26 | End: 2024-04-02

## 2024-03-26 RX ORDER — GLYCOPYRROLATE 0.2 MG/ML
INJECTION INTRAMUSCULAR; INTRAVENOUS PRN
Status: DISCONTINUED | OUTPATIENT
Start: 2024-03-26 | End: 2024-03-26 | Stop reason: SDUPTHER

## 2024-03-26 RX ORDER — MIDAZOLAM HYDROCHLORIDE 1 MG/ML
INJECTION INTRAMUSCULAR; INTRAVENOUS PRN
Status: DISCONTINUED | OUTPATIENT
Start: 2024-03-26 | End: 2024-03-26 | Stop reason: SDUPTHER

## 2024-03-26 RX ORDER — OXYCODONE HYDROCHLORIDE AND ACETAMINOPHEN 5; 325 MG/1; MG/1
1 TABLET ORAL
Status: COMPLETED | OUTPATIENT
Start: 2024-03-26 | End: 2024-03-26

## 2024-03-26 RX ORDER — ROCURONIUM BROMIDE 10 MG/ML
INJECTION, SOLUTION INTRAVENOUS PRN
Status: DISCONTINUED | OUTPATIENT
Start: 2024-03-26 | End: 2024-03-26 | Stop reason: SDUPTHER

## 2024-03-26 RX ORDER — SODIUM CHLORIDE 9 MG/ML
INJECTION, SOLUTION INTRAVENOUS PRN
Status: DISCONTINUED | OUTPATIENT
Start: 2024-03-26 | End: 2024-03-26 | Stop reason: HOSPADM

## 2024-03-26 RX ORDER — NALOXONE HYDROCHLORIDE 0.4 MG/ML
INJECTION, SOLUTION INTRAMUSCULAR; INTRAVENOUS; SUBCUTANEOUS PRN
Status: DISCONTINUED | OUTPATIENT
Start: 2024-03-26 | End: 2024-03-26 | Stop reason: HOSPADM

## 2024-03-26 RX ORDER — DEXAMETHASONE SODIUM PHOSPHATE 10 MG/ML
INJECTION INTRAMUSCULAR; INTRAVENOUS PRN
Status: DISCONTINUED | OUTPATIENT
Start: 2024-03-26 | End: 2024-03-26 | Stop reason: SDUPTHER

## 2024-03-26 RX ADMIN — GLYCOPYRROLATE 0.2 MG: 1 INJECTION INTRAMUSCULAR; INTRAVENOUS at 08:00

## 2024-03-26 RX ADMIN — FENTANYL CITRATE 50 MCG: 0.05 INJECTION, SOLUTION INTRAMUSCULAR; INTRAVENOUS at 08:13

## 2024-03-26 RX ADMIN — HYDROMORPHONE HYDROCHLORIDE 1 MG: 2 INJECTION, SOLUTION INTRAMUSCULAR; INTRAVENOUS; SUBCUTANEOUS at 08:24

## 2024-03-26 RX ADMIN — DEXAMETHASONE SODIUM PHOSPHATE 10 MG: 10 INJECTION INTRAMUSCULAR; INTRAVENOUS at 07:33

## 2024-03-26 RX ADMIN — ROCURONIUM BROMIDE 40 MG: 10 INJECTION, SOLUTION INTRAVENOUS at 07:25

## 2024-03-26 RX ADMIN — ONDANSETRON 4 MG: 2 INJECTION INTRAMUSCULAR; INTRAVENOUS at 08:01

## 2024-03-26 RX ADMIN — HYDROMORPHONE HYDROCHLORIDE 0.5 MG: 1 INJECTION, SOLUTION INTRAMUSCULAR; INTRAVENOUS; SUBCUTANEOUS at 09:11

## 2024-03-26 RX ADMIN — FENTANYL CITRATE 100 MCG: 0.05 INJECTION, SOLUTION INTRAMUSCULAR; INTRAVENOUS at 07:25

## 2024-03-26 RX ADMIN — HYDROMORPHONE HYDROCHLORIDE 0.5 MG: 1 INJECTION, SOLUTION INTRAMUSCULAR; INTRAVENOUS; SUBCUTANEOUS at 09:30

## 2024-03-26 RX ADMIN — LIDOCAINE HYDROCHLORIDE 80 MG: 20 INJECTION, SOLUTION INTRAVENOUS at 07:25

## 2024-03-26 RX ADMIN — OXYCODONE HYDROCHLORIDE AND ACETAMINOPHEN 1 TABLET: 5; 325 TABLET ORAL at 10:15

## 2024-03-26 RX ADMIN — HYDROMORPHONE HYDROCHLORIDE 0.5 MG: 1 INJECTION, SOLUTION INTRAMUSCULAR; INTRAVENOUS; SUBCUTANEOUS at 09:45

## 2024-03-26 RX ADMIN — SODIUM CHLORIDE: 9 INJECTION, SOLUTION INTRAVENOUS at 07:18

## 2024-03-26 RX ADMIN — MIDAZOLAM 2 MG: 1 INJECTION INTRAMUSCULAR; INTRAVENOUS at 07:20

## 2024-03-26 RX ADMIN — PROPOFOL 160 MG: 10 INJECTION, EMULSION INTRAVENOUS at 07:25

## 2024-03-26 RX ADMIN — SUGAMMADEX 200 MG: 100 INJECTION, SOLUTION INTRAVENOUS at 08:36

## 2024-03-26 RX ADMIN — HYDROMORPHONE HYDROCHLORIDE 0.5 MG: 1 INJECTION, SOLUTION INTRAMUSCULAR; INTRAVENOUS; SUBCUTANEOUS at 09:00

## 2024-03-26 RX ADMIN — SODIUM CHLORIDE: 9 INJECTION, SOLUTION INTRAVENOUS at 06:00

## 2024-03-26 RX ADMIN — VANCOMYCIN HYDROCHLORIDE 1000 MG: 1 INJECTION, POWDER, LYOPHILIZED, FOR SOLUTION INTRAVENOUS at 06:36

## 2024-03-26 RX ADMIN — FENTANYL CITRATE 50 MCG: 0.05 INJECTION, SOLUTION INTRAMUSCULAR; INTRAVENOUS at 07:42

## 2024-03-26 RX ADMIN — VANCOMYCIN HYDROCHLORIDE 1000 MG: 1 INJECTION, POWDER, LYOPHILIZED, FOR SOLUTION INTRAVENOUS at 07:18

## 2024-03-26 RX ADMIN — FENTANYL CITRATE 50 MCG: 0.05 INJECTION, SOLUTION INTRAMUSCULAR; INTRAVENOUS at 07:46

## 2024-03-26 ASSESSMENT — PAIN DESCRIPTION - DESCRIPTORS
DESCRIPTORS: BURNING
DESCRIPTORS: SHARP

## 2024-03-26 ASSESSMENT — PAIN SCALES - GENERAL
PAINLEVEL_OUTOF10: 6
PAINLEVEL_OUTOF10: 10
PAINLEVEL_OUTOF10: 4
PAINLEVEL_OUTOF10: 10
PAINLEVEL_OUTOF10: 9
PAINLEVEL_OUTOF10: 7
PAINLEVEL_OUTOF10: 6

## 2024-03-26 ASSESSMENT — ENCOUNTER SYMPTOMS
RESPIRATORY NEGATIVE: 1
EYES NEGATIVE: 1
BLOOD IN STOOL: 0
BACK PAIN: 0
VOMITING: 0
CONSTIPATION: 0
ABDOMINAL PAIN: 0
COUGH: 0
ALLERGIC/IMMUNOLOGIC NEGATIVE: 1
NAUSEA: 0
GASTROINTESTINAL NEGATIVE: 1
SHORTNESS OF BREATH: 0
ABDOMINAL DISTENTION: 0
DIARRHEA: 0
ANAL BLEEDING: 0

## 2024-03-26 ASSESSMENT — PAIN - FUNCTIONAL ASSESSMENT
PAIN_FUNCTIONAL_ASSESSMENT: 0-10
PAIN_FUNCTIONAL_ASSESSMENT: 0-10

## 2024-03-26 ASSESSMENT — PAIN DESCRIPTION - LOCATION
LOCATION: BREAST

## 2024-03-26 ASSESSMENT — PAIN DESCRIPTION - ORIENTATION
ORIENTATION: LEFT

## 2024-03-26 ASSESSMENT — LIFESTYLE VARIABLES: SMOKING_STATUS: 1

## 2024-03-26 NOTE — ANESTHESIA PRE PROCEDURE
Prophylactic antiemetics administered.  Anesthetic plan and risks discussed with patient.    Use of blood products discussed with patient whom consented to blood products.    Plan discussed with attending and CRNA.                    Anjel Rocha RN   3/26/2024

## 2024-03-26 NOTE — ANESTHESIA POSTPROCEDURE EVALUATION
Department of Anesthesiology  Postprocedure Note    Patient: Jesse Jack  MRN: [id number]  YOB: 1985  Date of evaluation: 3/26/2024    Procedure Summary       Date: 03/26/24 Room / Location: [address]    Anesthesia Start: 0718 Anesthesia Stop:     Procedure: Left breast complex cyst excision (Left: Breast) Diagnosis:       Cystic disease of breast, left      (Cystic disease of breast, left [N60.12])    Surgeons: Kylah Gilmore MD Responsible Provider: Chano Medina MD    Anesthesia Type: general ASA Status: 2            Anesthesia Type: No value filed.    Prince Phase I: Prince Score: 10    Prince Phase II:      Anesthesia Post Evaluation    Patient location during evaluation: PACU  Patient participation: complete - patient participated  Level of consciousness: awake  Pain score: 3  Airway patency: patent  Nausea & Vomiting: no nausea and no vomiting  Cardiovascular status: blood pressure returned to baseline  Respiratory status: acceptable  Hydration status: euvolemic      No notable events documented.

## 2024-03-26 NOTE — BRIEF OP NOTE
Brief Postoperative Note      Patient: Jesse Jack  YOB: 1985  MRN: [id number]    Date of Procedure: 3/26/2024    Pre-Op Diagnosis Codes:     * Cystic disease of breast, left [N60.12]    Post-Op Diagnosis: Same       Procedure(s):  Left breast complex cyst excision    Surgeon(s):  Kylah Gilmore MD    Assistant:  Resident: Cady Evans MD; Amber Olmos DO    Anesthesia: General    Estimated Blood Loss (mL): less than 50     Complications: None    Specimens:   ID Type Source Tests Collected by Time Destination   A : RECURRENT LEFT BREAST ABCESS.ROLLOUT IDIOPATHIC GRANULOMATOUS MASTITIS Tissue Breast SURGICAL PATHOLOGY Kylah Gilmore MD 3/26/2024 0800        Implants:  * No implants in log *      Drains: * No LDAs found *    Findings:   Infection present at the time of surgery as evidenced by purulence at the level of superficial tissue.           Electronically signed by Kylah Gilmore MD on 3/26/2024 at 9:08 AM

## 2024-03-26 NOTE — INTERVAL H&P NOTE
Update History & Physical    The patient's History and Physical of March 15, 2024 was reviewed with the patient and I examined the patient. There was no change. The surgical site was confirmed by the patient and me.     Plan: The risks, benefits, expected outcome, and alternative to the recommended procedure have been discussed with the patient. Patient understands and wants to proceed with the procedure.     Electronically signed by Cady Evans MD on 3/26/2024 at 5:40 AM

## 2024-03-26 NOTE — OP NOTE
[facility]  OPERATIVE REPORT    Jesse Jack  1985      DATE OF PROCEDURE: 3/26/2024     SURGEON: Kylah Gilmore MD, MSc, FACS     ASSISTANT: JED Evans MD, PGY-V; TEE Olmos DO, PGY-I     PREOPERATIVE DIAGNOSIS:  left breast recurrent abscesses.     POSTOPERATIVE DIAGNOSIS: Same    OPERATION: excision of complex cyst and periductal fistulas     ANESTHESIA: GETA     ESTIMATED BLOOD LOSS: 20cc     COMPLICATIONS: None    SPECIMEN:  left breast mass r/o IGM    DRAINS:  none    HISTORY:  This is a 38 y.o.female who presented with left breast abscess last month she underwent aspiration in the ER and has been on antibiotics since.  She has had persistent drainage.  She wanted a mastectomy.  I told her she can have a mastectomy but we may be able to remove the persistent fistulas.  She understands that this is close to her nipple and she may lose her nipple areolar complex.  We are to try removing the tracks now and if they recur she may need a central lumpectomy.  She is agreeable to the procedure and understand the risks.    DESCRIPTION OF PROCEDURE: The patient was identified and the procedure was confirmed.  Vancomycin 1 g IV was given, bilateral seeds were placed, lower Melania hugger applied.  She was prepped and draped in standard surgical fashion.  I mapped out a horseshoe shaped incision around the areola border including the periductal fistulous.  Using PEAK PlasmaBlade we made our incision.  We went down about a centimeter into the normal breast tissue removing all these fistulous tracts.  There was one that appeared to go under the nipple I used a lacrimal duct probe to probe it but it did not go up through the nipple.  I tried to place electrical duct probe through the nipple but there was no ducts that were open.  We marked the tissue on the back table using 6 colors of paint using vector marking kit for pathology.  We sent it to pathology to rule out idiopathic granulomatous

## 2024-03-26 NOTE — H&P
[facility]  HISTORY & PHYSICAL  ATTENDING NOTE    CC:  left breast recurrent abscess    HPI:  38-year-old female who presented this past weekend with a left breast abscess. She went to a walk-in clinic a few days before and was placed on antibiotics. We will formed an incision and drainage while she was in the emergency department add an additional antibiotic. Her packing has since come out. The wound is still occasionally draining. She wants surgery on her breast but I have advised her she can have surgery because it still inflamed. She is also complaining of a left thigh, inner medial thigh abscess. She has had abscesses of the breast before the last was about 7 years ago. She states she always feels like a mass that inflamed and then goes down. She stopped smoking tobacco this week but is heavily smoking marijuana she smells of marijuana today. She still taking her antibiotics.     She has been on 2 rounds of antibiotics. Doxy/keflex and levaquin/flagyl. She missed an appointment on 3/1. She states the left breast is draining again. She states it gets hot. She is tired of dealing with it and wants the breast removed.       Past Medical History:   Diagnosis Date    Anxiety     Bipolar disorder (HCC)     Depression     on multiple medications    Seizures (HCC) 2018     Past Surgical History:   Procedure Laterality Date    BREAST SURGERY      Removal of cyst on left     SECTION      x5     Family History   Problem Relation Age of Onset    Diabetes Father     Heart Attack Father     Breast Cancer Maternal Aunt 37    Breast Cancer Maternal Grandmother 44    Diabetes Maternal Grandmother     Cancer Maternal Grandfather 60        lung    Diabetes Maternal Grandfather     Diabetes Paternal Grandmother     Breast Cancer Maternal Cousin 36    Cancer Maternal Cousin 38        cervical    Cancer Maternal Cousin 38        cervical    Breast Cancer Other 48        Great materal aunt

## 2024-03-28 LAB — SURGICAL PATHOLOGY REPORT: NORMAL

## 2024-04-02 ENCOUNTER — APPOINTMENT (OUTPATIENT)
Dept: CT IMAGING | Age: 39
End: 2024-04-02
Payer: COMMERCIAL

## 2024-04-02 ENCOUNTER — OFFICE VISIT (OUTPATIENT)
Dept: FAMILY MEDICINE CLINIC | Age: 39
End: 2024-04-02
Payer: COMMERCIAL

## 2024-04-02 ENCOUNTER — HOSPITAL ENCOUNTER (EMERGENCY)
Age: 39
Discharge: HOME | End: 2024-04-02
Attending: EMERGENCY MEDICINE
Payer: COMMERCIAL

## 2024-04-02 VITALS
DIASTOLIC BLOOD PRESSURE: 101 MMHG | SYSTOLIC BLOOD PRESSURE: 126 MMHG | RESPIRATION RATE: 20 BRPM | TEMPERATURE: 98.2 F | OXYGEN SATURATION: 99 % | HEART RATE: 60 BPM

## 2024-04-02 VITALS
TEMPERATURE: 98.4 F | HEART RATE: 55 BPM | BODY MASS INDEX: 32.85 KG/M2 | SYSTOLIC BLOOD PRESSURE: 112 MMHG | OXYGEN SATURATION: 99 % | WEIGHT: 174 LBS | HEIGHT: 61 IN | DIASTOLIC BLOOD PRESSURE: 68 MMHG

## 2024-04-02 DIAGNOSIS — T81.49XA INFECTED SURGICAL WOUND: Primary | ICD-10-CM

## 2024-04-02 DIAGNOSIS — N61.1 BREAST ABSCESS: Primary | ICD-10-CM

## 2024-04-02 DIAGNOSIS — G89.18 POST-OPERATIVE PAIN: ICD-10-CM

## 2024-04-02 DIAGNOSIS — N64.4 BREAST PAIN: ICD-10-CM

## 2024-04-02 LAB
ALBUMIN SERPL-MCNC: 4 G/DL (ref 3.5–5.2)
ALP SERPL-CCNC: 72 U/L (ref 35–104)
ALT SERPL-CCNC: 11 U/L (ref 0–32)
ANION GAP SERPL CALCULATED.3IONS-SCNC: 8 MMOL/L (ref 7–16)
AST SERPL-CCNC: 15 U/L (ref 0–31)
BASOPHILS # BLD: 0.03 K/UL (ref 0–0.2)
BASOPHILS NFR BLD: 0 % (ref 0–2)
BILIRUB SERPL-MCNC: 0.2 MG/DL (ref 0–1.2)
BUN SERPL-MCNC: 5 MG/DL (ref 6–20)
CALCIUM SERPL-MCNC: 9.3 MG/DL (ref 8.6–10.2)
CHLORIDE SERPL-SCNC: 103 MMOL/L (ref 98–107)
CO2 SERPL-SCNC: 26 MMOL/L (ref 22–29)
CREAT SERPL-MCNC: 0.8 MG/DL (ref 0.5–1)
EOSINOPHIL # BLD: 0.07 K/UL (ref 0.05–0.5)
EOSINOPHILS RELATIVE PERCENT: 1 % (ref 0–6)
ERYTHROCYTE [DISTWIDTH] IN BLOOD BY AUTOMATED COUNT: 13.8 % (ref 11.5–15)
GFR SERPL CREATININE-BSD FRML MDRD: >90 ML/MIN/1.73M2
GLUCOSE SERPL-MCNC: 110 MG/DL (ref 74–99)
HCG, URINE, POC: NEGATIVE
HCT VFR BLD AUTO: 42.2 % (ref 34–48)
HGB BLD-MCNC: 13.5 G/DL (ref 11.5–15.5)
IMM GRANULOCYTES # BLD AUTO: 0.05 K/UL (ref 0–0.58)
IMM GRANULOCYTES NFR BLD: 1 % (ref 0–5)
LYMPHOCYTES NFR BLD: 2.64 K/UL (ref 1.5–4)
LYMPHOCYTES RELATIVE PERCENT: 28 % (ref 20–42)
Lab: NORMAL
MCH RBC QN AUTO: 29.6 PG (ref 26–35)
MCHC RBC AUTO-ENTMCNC: 32 G/DL (ref 32–34.5)
MCV RBC AUTO: 92.5 FL (ref 80–99.9)
MONOCYTES NFR BLD: 0.64 K/UL (ref 0.1–0.95)
MONOCYTES NFR BLD: 7 % (ref 2–12)
NEGATIVE QC PASS/FAIL: NORMAL
NEUTROPHILS NFR BLD: 63 % (ref 43–80)
NEUTS SEG NFR BLD: 5.9 K/UL (ref 1.8–7.3)
PLATELET # BLD AUTO: 368 K/UL (ref 130–450)
PMV BLD AUTO: 9.5 FL (ref 7–12)
POSITIVE QC PASS/FAIL: NORMAL
POTASSIUM SERPL-SCNC: 4.1 MMOL/L (ref 3.5–5)
PROT SERPL-MCNC: 6.9 G/DL (ref 6.4–8.3)
RBC # BLD AUTO: 4.56 M/UL (ref 3.5–5.5)
SODIUM SERPL-SCNC: 137 MMOL/L (ref 132–146)
WBC OTHER # BLD: 9.3 K/UL (ref 4.5–11.5)

## 2024-04-02 PROCEDURE — 85025 COMPLETE CBC W/AUTO DIFF WBC: CPT

## 2024-04-02 PROCEDURE — 99285 EMERGENCY DEPT VISIT HI MDM: CPT

## 2024-04-02 PROCEDURE — 1036F TOBACCO NON-USER: CPT | Performed by: PHYSICIAN ASSISTANT

## 2024-04-02 PROCEDURE — G8427 DOCREV CUR MEDS BY ELIG CLIN: HCPCS | Performed by: PHYSICIAN ASSISTANT

## 2024-04-02 PROCEDURE — 96372 THER/PROPH/DIAG INJ SC/IM: CPT

## 2024-04-02 PROCEDURE — G8417 CALC BMI ABV UP PARAM F/U: HCPCS | Performed by: PHYSICIAN ASSISTANT

## 2024-04-02 PROCEDURE — 80053 COMPREHEN METABOLIC PANEL: CPT

## 2024-04-02 PROCEDURE — 71260 CT THORAX DX C+: CPT

## 2024-04-02 PROCEDURE — 87075 CULTR BACTERIA EXCEPT BLOOD: CPT

## 2024-04-02 PROCEDURE — 87070 CULTURE OTHR SPECIMN AEROBIC: CPT

## 2024-04-02 PROCEDURE — 6360000002 HC RX W HCPCS: Performed by: PHYSICIAN ASSISTANT

## 2024-04-02 PROCEDURE — 99215 OFFICE O/P EST HI 40 MIN: CPT | Performed by: PHYSICIAN ASSISTANT

## 2024-04-02 PROCEDURE — 87205 SMEAR GRAM STAIN: CPT

## 2024-04-02 PROCEDURE — 99024 POSTOP FOLLOW-UP VISIT: CPT | Performed by: SURGERY

## 2024-04-02 PROCEDURE — 96374 THER/PROPH/DIAG INJ IV PUSH: CPT

## 2024-04-02 PROCEDURE — 6360000004 HC RX CONTRAST MEDICATION: Performed by: RADIOLOGY

## 2024-04-02 RX ORDER — DOXYCYCLINE HYCLATE 100 MG
100 TABLET ORAL 2 TIMES DAILY
Qty: 14 TABLET | Refills: 0 | Status: SHIPPED | OUTPATIENT
Start: 2024-04-02 | End: 2024-04-09

## 2024-04-02 RX ORDER — MORPHINE SULFATE 4 MG/ML
4 INJECTION, SOLUTION INTRAMUSCULAR; INTRAVENOUS ONCE
Status: COMPLETED | OUTPATIENT
Start: 2024-04-02 | End: 2024-04-02

## 2024-04-02 RX ORDER — OXYCODONE HYDROCHLORIDE AND ACETAMINOPHEN 5; 325 MG/1; MG/1
1 TABLET ORAL EVERY 6 HOURS PRN
Qty: 28 TABLET | Refills: 0 | Status: SHIPPED | OUTPATIENT
Start: 2024-04-02 | End: 2024-04-09

## 2024-04-02 RX ORDER — SUCRALFATE 1 G/1
TABLET ORAL
COMMUNITY
Start: 2024-03-21

## 2024-04-02 RX ORDER — ONDANSETRON 4 MG/1
4 TABLET, ORALLY DISINTEGRATING ORAL ONCE
Status: DISCONTINUED | OUTPATIENT
Start: 2024-04-02 | End: 2024-04-02

## 2024-04-02 RX ORDER — CEPHALEXIN 250 MG/1
500 CAPSULE ORAL 2 TIMES DAILY
Qty: 28 CAPSULE | Refills: 0 | Status: SHIPPED | OUTPATIENT
Start: 2024-04-02 | End: 2024-04-09

## 2024-04-02 RX ORDER — ROSUVASTATIN CALCIUM 10 MG/1
10 TABLET, COATED ORAL DAILY
COMMUNITY
Start: 2024-03-21

## 2024-04-02 RX ORDER — ONDANSETRON 2 MG/ML
4 INJECTION INTRAMUSCULAR; INTRAVENOUS ONCE
Status: COMPLETED | OUTPATIENT
Start: 2024-04-02 | End: 2024-04-02

## 2024-04-02 RX ADMIN — ONDANSETRON 4 MG: 2 INJECTION INTRAMUSCULAR; INTRAVENOUS at 15:10

## 2024-04-02 RX ADMIN — IOPAMIDOL 75 ML: 755 INJECTION, SOLUTION INTRAVENOUS at 16:20

## 2024-04-02 RX ADMIN — MORPHINE SULFATE 4 MG: 4 INJECTION, SOLUTION INTRAMUSCULAR; INTRAVENOUS at 15:11

## 2024-04-02 ASSESSMENT — PAIN SCALES - GENERAL
PAINLEVEL_OUTOF10: 10
PAINLEVEL_OUTOF10: 10

## 2024-04-02 ASSESSMENT — PAIN DESCRIPTION - LOCATION
LOCATION: BREAST
LOCATION: BREAST

## 2024-04-02 ASSESSMENT — ENCOUNTER SYMPTOMS
ALLERGIC/IMMUNOLOGIC NEGATIVE: 1
RESPIRATORY NEGATIVE: 1
DIARRHEA: 0
SHORTNESS OF BREATH: 0
ABDOMINAL PAIN: 0
ABDOMINAL DISTENTION: 0
NAUSEA: 0
EYES NEGATIVE: 1
ANAL BLEEDING: 0
COUGH: 0
GASTROINTESTINAL NEGATIVE: 1
BACK PAIN: 0
BLOOD IN STOOL: 0
CONSTIPATION: 0

## 2024-04-02 ASSESSMENT — PAIN - FUNCTIONAL ASSESSMENT: PAIN_FUNCTIONAL_ASSESSMENT: 0-10

## 2024-04-02 NOTE — PROGRESS NOTES
24  Jesse Jack : 1985 Sex: female  Age 38 y.o.      Subjective:  Chief Complaint   Patient presents with    Headache    Dizziness    Other     Had surgery on Left breast and has extreme pain.  Drainage, lots of drainage, yellow/pinkish drainage. Looks like it is pulling open. Unable to reach surgeon.          HPI:   HPI  Jesse Jack , 38 y.o. female presents to express care for evaluation of left breast pain.  The patient states that last Tuesday she had surgery on her left breast for a abscess.  The patient has noted increasing pain and discomfort to the left breast starting on Thursday and Friday and worse throughout the weekend.  The patient states that she contacted surgeons office and they recommended going to the emergency department.  The patient has noted that the  sutures that are in place seem to be pulling apart and the wound seems to be opening with yellow/pink drainage.  The patient is not having any, shortness of breath.  Fevers that she knows but she does note headache, dizziness.  The patient is not any chest pain      ROS:   Unless otherwise stated in this report the patient's positive and negative responses for review of systems for constitutional, eyes, ENT, cardiovascular, respiratory, gastrointestinal, neurological, , musculoskeletal, and integument systems and related systems to the presenting problem are either stated in the history of present illness or were not pertinent or were negative for the symptoms and/or complaints related to the presenting medical problem.  Positives and pertinent negatives as per HPI.  All others reviewed and are negative.      PMH:     Past Medical History:   Diagnosis Date    Anxiety     Bipolar disorder (HCC)     Depression     on multiple medications    Seizures (HCC) 2018       Past Surgical History:   Procedure Laterality Date    BREAST SURGERY      Removal of cyst on left    BREAST SURGERY Left 3/26/2024    Left

## 2024-04-02 NOTE — ED PROVIDER NOTES
department chief complaint of left breast wound.  On arrival in the emergency department due to patient's breast pain, patient is given 4 mg of IM morphine as well as 4 mg of IV Zofran.  Patient has improvement of symptoms.  CMP without significant electrolyte derangements, kidney injury, or LFT changes and CBC with no leukocytosis or anemia.  Pregnancy test is negative and CT chest demonstrates subcutaneous soft tissue infiltration and skin thickening of the left breast with some soft tissue gas bubbles, likely related to recent instrumentation and procedure.  Spoke with general surgery who evaluates patient and recommends outpatient follow-up with antibiotics and pain control as well as topical dressing.  Patient is also follow-up with general surgery this coming week and is agreeable with this plan.  Patient resting comfortably and discharged home.    Extensively reviewed laboratory and imaging findings with patient/family members/available representative parties and all questions answered at this time to the fullest extent possible.  Patient has been closely monitored with multiple reevaluations and has remained hemodynamically stable.  They have clinically improved and through shared decision-making, patient is to be discharged to home. Patient is understanding and agreeable with this.  They have been instructed to follow-up with family physician and surgeon as soon as possible and are provided with strict return precautions as to which they should return to the nearest available emergency department.  Patient acknowledges understanding of all of these instructions and is to be discharged at this time.    Differential diagnosis includes but is not limited to: Breast abscess, sepsis, wound dehiscence    Please refer to the ED Course as available for additional MDM.  ED Course as of 04/02/24 1832   Tue Apr 02, 2024   4062 Patient presents with pain, wound dehiscence, and purulent drainage from left breast I&D

## 2024-04-02 NOTE — CONSULTS
[place] [facility]  CONSULT  ATTENDING NOTE    Chief Complaint   Patient presents with    Post-op Problem     Breast abscess sug x 6 days post op complications, wound leaking dehiscence + fever and chills       HPI: 38-year-old female who underwent a right periareolar periductal fistula excision for chronic infected cyst and abscess.  She has quit using tobacco but still continues to smoke marijuana.  She states she is been calling the office since Thursday with pain and burning but no message has been placed in her chart.  She is not certain of the phone number she has been calling.  She has just been transferred.  She states she has been draining a lot having serous drainage 3-4 times a day soaking a 4 x 4.  She finished the Levaquin she had been taking.    CT of the chest was performed by the emergency department I have reviewed this there is dehiscence of the small amount of fluid but there is no significant issues in the left breast.  CBC was reviewed and white count is normal.    Past Medical History:   Diagnosis Date    Anxiety     Bipolar disorder (HCC)     Depression     on multiple medications    Seizures (HCC) 2018     Past Surgical History:   Procedure Laterality Date    BREAST SURGERY      Removal of cyst on left    BREAST SURGERY Left 3/26/2024    Left breast complex cyst excision performed by Kylah Gilmore MD at [facility] OR     SECTION      x5     Family History   Problem Relation Age of Onset    Diabetes Father     Heart Attack Father     Breast Cancer Maternal Aunt 37    Breast Cancer Maternal Grandmother 44    Diabetes Maternal Grandmother     Cancer Maternal Grandfather 60        lung    Diabetes Maternal Grandfather     Diabetes Paternal Grandmother     Breast Cancer Maternal Cousin 36    Cancer Maternal Cousin 38        cervical    Cancer Maternal Cousin 38        cervical    Breast Cancer Other 48        Great materal aunt     Social History     Tobacco Use

## 2024-04-02 NOTE — DISCHARGE INSTRUCTIONS
Clean wound in shower daily with soap and water.  Moisten gauze with saline and lay in wound--change daily  Apply bactroban OR neosporin to closed areas of wound twice daily    Please call office if any issues:  [phone]    Return to emergency department if you develop new or worsening symptoms.

## 2024-04-04 LAB
MICROORGANISM SPEC CULT: NO GROWTH
MICROORGANISM/AGENT SPEC: NORMAL
SPECIMEN DESCRIPTION: NORMAL

## 2024-04-05 ENCOUNTER — OFFICE VISIT (OUTPATIENT)
Dept: BREAST CENTER | Age: 39
End: 2024-04-05

## 2024-04-05 VITALS
HEART RATE: 76 BPM | BODY MASS INDEX: 32.47 KG/M2 | RESPIRATION RATE: 14 BRPM | WEIGHT: 172 LBS | SYSTOLIC BLOOD PRESSURE: 102 MMHG | TEMPERATURE: 98.2 F | HEIGHT: 61 IN | DIASTOLIC BLOOD PRESSURE: 64 MMHG | OXYGEN SATURATION: 99 %

## 2024-04-05 DIAGNOSIS — T81.30XA WOUND DEHISCENCE: ICD-10-CM

## 2024-04-05 DIAGNOSIS — N61.1 BREAST ABSCESS: ICD-10-CM

## 2024-04-05 DIAGNOSIS — N60.12 CYSTIC DISEASE OF BREAST, LEFT: Primary | ICD-10-CM

## 2024-04-05 PROCEDURE — 99024 POSTOP FOLLOW-UP VISIT: CPT | Performed by: SURGERY

## 2024-04-05 RX ORDER — SODIUM CHLORIDE 9 MG/ML
INJECTION, SOLUTION INTRAVENOUS CONTINUOUS
Status: CANCELLED | OUTPATIENT
Start: 2024-04-05

## 2024-04-05 RX ORDER — SODIUM CHLORIDE 0.9 % (FLUSH) 0.9 %
5-40 SYRINGE (ML) INJECTION PRN
Status: CANCELLED | OUTPATIENT
Start: 2024-04-05

## 2024-04-05 RX ORDER — SODIUM CHLORIDE 9 MG/ML
INJECTION, SOLUTION INTRAVENOUS PRN
Status: CANCELLED | OUTPATIENT
Start: 2024-04-05

## 2024-04-05 RX ORDER — ACETAMINOPHEN 500 MG
500 TABLET ORAL EVERY 6 HOURS PRN
COMMUNITY

## 2024-04-05 RX ORDER — SODIUM CHLORIDE 0.9 % (FLUSH) 0.9 %
5-40 SYRINGE (ML) INJECTION EVERY 12 HOURS SCHEDULED
Status: CANCELLED | OUTPATIENT
Start: 2024-04-05

## 2024-04-05 ASSESSMENT — ENCOUNTER SYMPTOMS
NAUSEA: 0
ABDOMINAL PAIN: 0
RESPIRATORY NEGATIVE: 1
EYES NEGATIVE: 1
ALLERGIC/IMMUNOLOGIC NEGATIVE: 1
COUGH: 0
DIARRHEA: 0
BLOOD IN STOOL: 0
GASTROINTESTINAL NEGATIVE: 1
VOMITING: 0
CONSTIPATION: 0
ANAL BLEEDING: 0
BACK PAIN: 0
SHORTNESS OF BREATH: 0
ABDOMINAL DISTENTION: 0

## 2024-04-05 NOTE — PATIENT INSTRUCTIONS
Patient is scheduled for surgery 4/8/24 @ 7:30am / Arrival 5:30am      Patient notified of date and time of surgery. Patient was instructed to enter the [facility] entrance of the hospital, park in parking lot I on the corner of [address].  Patient was instructed NPO after midnight the night prior to surgery.  Patient was instructed NO ASA products or blood thinners 3-5 days prior to surgery.    Patient was instructed to bring a sports bra with her day of surgery.    Preadmission testing will contact patient prior to surgery, to go over medications and any additional testing that may need to be completed.

## 2024-04-05 NOTE — PROGRESS NOTES
[facility]   PRE-ADMISSION TESTING GENERAL INSTRUCTIONS  PAT Phone Number: [phone]      GENERAL INSTRUCTIONS:    [x] Antibacterial Soap Shower Night before or AM of surgery.  [] CHG Wipes instruction sheet and wipes given.  [x] Do not wear makeup, lotions, powders, deodorant the morning of surgery.  [x] Nothing to eat or drink after midnight. This includes no gum, candy, mints or water.  [x] You may brush your teeth, gargle, but do not swallow water.   [x] No tobacco products, marijuana, illegal drugs, or alcohol within 24 hours of your surgery.  [x] Jewelry or valuables should not be brought to the hospital. All body and/or tongue piercing's must be removed prior to arriving to hospital. No contact lens or hair pins.   [x] Arrange transportation with a responsible adult  to and from the hospital. Arrange for someone to be with you for the remainder of the day and for 24 hours after your procedure due to having had anesthesia.          -Who will be your  for transportation? Mom and dad        -Who will be staying with you for 24 hrs after your procedure? Mom and dad  [x] Bring insurance card and photo ID.  [] Bring copy of living will or healthcare power of  papers to be placed in your electronic record.  [x] Urine Pregnancy test will be preformed the day of surgery. A specimen sample may be brought from home.  [] Transfusion (Green) Bracelet: Please bring with you to hospital, day of surgery.     PARKING INSTRUCTIONS:     [x] ARRIVAL DATE & TIME: 4/8 at 5:30 am  [x] Times are subject to change. We will contact you the business day before surgery if that were to occur.  [x] Enter into the [facility] Entrance. Two people may accompany you. Masks are not required.  [x] Parking Lot \"I\" is where you will park. It is located on the corner of [facility] [address] [facility]. The entrance is on [facility].   Only one vehicle - per patient, is permitted in

## 2024-04-05 NOTE — PROGRESS NOTES
[place] [facility]  HISTORY & PHYSICAL  ATTENDING NOTE    Chief Complaint   Patient presents with    Post-Op Check     Post op - left breast complex cyst     HPI: 38-year-old female who presents after excision of complex cyst and abscess.  She was seen earlier this week on Monday in the emergency department.  Her wound had dehisced.  We have just laid moistened gauze in the wound and set her up with home health care.  Home health care has been packing the wound and her wound has gotten larger.  She was having a lot of pain she is in less pain today.  The wound is very large.  It is tender to palpation though.  There is no foul order she denies any fevers or chills.    Past Medical History:   Diagnosis Date    ADHD     Anxiety     Bipolar disorder (HCC)     Depression     on multiple medications    Seizures (HCC) 2018     Past Surgical History:   Procedure Laterality Date    BREAST SURGERY      Removal of cyst on left-    BREAST SURGERY Left 2024    Left breast complex cyst excision performed by Kylah Gilmore MD at [facility] OR     SECTION      x5     Family History   Problem Relation Age of Onset    Diabetes Father     Heart Attack Father     Breast Cancer Maternal Aunt 37    Breast Cancer Maternal Grandmother 44    Diabetes Maternal Grandmother     Cancer Maternal Grandfather 60        lung    Diabetes Maternal Grandfather     Diabetes Paternal Grandmother     Breast Cancer Maternal Cousin 36    Cancer Maternal Cousin 38        cervical    Cancer Maternal Cousin 38        cervical    Breast Cancer Other 48        Great materal aunt     Social History     Tobacco Use    Smoking status: Some Days     Current packs/day: 0.00     Average packs/day: 1 pack/day for 16.0 years (16.0 ttl pk-yrs)     Types: Cigarettes     Last attempt to quit: 3/7/2024     Years since quittin.0     Passive exposure: Current    Smokeless tobacco: Never    Tobacco comments:     Pt states she used

## 2024-04-07 ENCOUNTER — ANESTHESIA EVENT (OUTPATIENT)
Dept: OPERATING ROOM | Age: 39
End: 2024-04-07
Payer: COMMERCIAL

## 2024-04-07 LAB
MICROORGANISM SPEC CULT: NORMAL
SPECIMEN DESCRIPTION: NORMAL

## 2024-04-08 ENCOUNTER — HOSPITAL ENCOUNTER (OUTPATIENT)
Age: 39
Setting detail: OUTPATIENT SURGERY
Discharge: HOME | End: 2024-04-08
Attending: SURGERY | Admitting: SURGERY
Payer: COMMERCIAL

## 2024-04-08 ENCOUNTER — ANESTHESIA (OUTPATIENT)
Dept: OPERATING ROOM | Age: 39
End: 2024-04-08
Payer: COMMERCIAL

## 2024-04-08 VITALS
DIASTOLIC BLOOD PRESSURE: 92 MMHG | BODY MASS INDEX: 32.47 KG/M2 | TEMPERATURE: 97 F | SYSTOLIC BLOOD PRESSURE: 118 MMHG | OXYGEN SATURATION: 98 % | HEIGHT: 61 IN | WEIGHT: 172 LBS | RESPIRATION RATE: 21 BRPM | HEART RATE: 73 BPM

## 2024-04-08 DIAGNOSIS — N61.1 CHRONIC ABSCESS OF BREAST: ICD-10-CM

## 2024-04-08 DIAGNOSIS — Z01.812 PRE-OPERATIVE LABORATORY EXAMINATION: Primary | ICD-10-CM

## 2024-04-08 DIAGNOSIS — N61.1 BREAST ABSCESS: ICD-10-CM

## 2024-04-08 LAB
HCG, URINE, POC: NEGATIVE
Lab: NORMAL
NEGATIVE QC PASS/FAIL: NORMAL
POSITIVE QC PASS/FAIL: NORMAL

## 2024-04-08 PROCEDURE — 19120 REMOVAL OF BREAST LESION: CPT | Performed by: SURGERY

## 2024-04-08 PROCEDURE — 7100000000 HC PACU RECOVERY - FIRST 15 MIN: Performed by: SURGERY

## 2024-04-08 PROCEDURE — 3700000001 HC ADD 15 MINUTES (ANESTHESIA): Performed by: SURGERY

## 2024-04-08 PROCEDURE — 2720000010 HC SURG SUPPLY STERILE: Performed by: SURGERY

## 2024-04-08 PROCEDURE — 2580000003 HC RX 258: Performed by: SURGERY

## 2024-04-08 PROCEDURE — 2709999900 HC NON-CHARGEABLE SUPPLY: Performed by: SURGERY

## 2024-04-08 PROCEDURE — 3600000013 HC SURGERY LEVEL 3 ADDTL 15MIN: Performed by: SURGERY

## 2024-04-08 PROCEDURE — 3700000000 HC ANESTHESIA ATTENDED CARE: Performed by: SURGERY

## 2024-04-08 PROCEDURE — 6360000002 HC RX W HCPCS: Performed by: SURGERY

## 2024-04-08 PROCEDURE — 3600000003 HC SURGERY LEVEL 3 BASE: Performed by: SURGERY

## 2024-04-08 PROCEDURE — 7100000010 HC PHASE II RECOVERY - FIRST 15 MIN: Performed by: SURGERY

## 2024-04-08 PROCEDURE — 2500000003 HC RX 250 WO HCPCS: Performed by: ANESTHESIOLOGY

## 2024-04-08 PROCEDURE — 2500000003 HC RX 250 WO HCPCS: Performed by: SURGERY

## 2024-04-08 PROCEDURE — 88312 SPECIAL STAINS GROUP 1: CPT

## 2024-04-08 PROCEDURE — 6360000002 HC RX W HCPCS: Performed by: ANESTHESIOLOGIST ASSISTANT

## 2024-04-08 PROCEDURE — 6370000000 HC RX 637 (ALT 250 FOR IP)

## 2024-04-08 PROCEDURE — 88307 TISSUE EXAM BY PATHOLOGIST: CPT

## 2024-04-08 PROCEDURE — 7100000011 HC PHASE II RECOVERY - ADDTL 15 MIN: Performed by: SURGERY

## 2024-04-08 PROCEDURE — 7100000001 HC PACU RECOVERY - ADDTL 15 MIN: Performed by: SURGERY

## 2024-04-08 RX ORDER — NALOXONE HYDROCHLORIDE 0.4 MG/ML
INJECTION, SOLUTION INTRAMUSCULAR; INTRAVENOUS; SUBCUTANEOUS PRN
Status: DISCONTINUED | OUTPATIENT
Start: 2024-04-08 | End: 2024-04-08 | Stop reason: HOSPADM

## 2024-04-08 RX ORDER — FENTANYL CITRATE 50 UG/ML
INJECTION, SOLUTION INTRAMUSCULAR; INTRAVENOUS PRN
Status: DISCONTINUED | OUTPATIENT
Start: 2024-04-08 | End: 2024-04-08 | Stop reason: SDUPTHER

## 2024-04-08 RX ORDER — DEXAMETHASONE SODIUM PHOSPHATE 10 MG/ML
INJECTION INTRAMUSCULAR; INTRAVENOUS PRN
Status: DISCONTINUED | OUTPATIENT
Start: 2024-04-08 | End: 2024-04-08 | Stop reason: SDUPTHER

## 2024-04-08 RX ORDER — MIDAZOLAM HYDROCHLORIDE 1 MG/ML
INJECTION INTRAMUSCULAR; INTRAVENOUS PRN
Status: DISCONTINUED | OUTPATIENT
Start: 2024-04-08 | End: 2024-04-08 | Stop reason: SDUPTHER

## 2024-04-08 RX ORDER — SODIUM CHLORIDE 0.9 % (FLUSH) 0.9 %
5-40 SYRINGE (ML) INJECTION PRN
Status: DISCONTINUED | OUTPATIENT
Start: 2024-04-08 | End: 2024-04-08 | Stop reason: HOSPADM

## 2024-04-08 RX ORDER — LIDOCAINE HYDROCHLORIDE 20 MG/ML
INJECTION, SOLUTION INTRAVENOUS PRN
Status: DISCONTINUED | OUTPATIENT
Start: 2024-04-08 | End: 2024-04-08 | Stop reason: SDUPTHER

## 2024-04-08 RX ORDER — LIDOCAINE HYDROCHLORIDE AND EPINEPHRINE 10; 10 MG/ML; UG/ML
INJECTION, SOLUTION INFILTRATION; PERINEURAL PRN
Status: DISCONTINUED | OUTPATIENT
Start: 2024-04-08 | End: 2024-04-08 | Stop reason: ALTCHOICE

## 2024-04-08 RX ORDER — SODIUM CHLORIDE 9 MG/ML
INJECTION, SOLUTION INTRAVENOUS PRN
Status: DISCONTINUED | OUTPATIENT
Start: 2024-04-08 | End: 2024-04-08 | Stop reason: HOSPADM

## 2024-04-08 RX ORDER — SODIUM CHLORIDE 9 MG/ML
INJECTION, SOLUTION INTRAVENOUS CONTINUOUS
Status: DISCONTINUED | OUTPATIENT
Start: 2024-04-08 | End: 2024-04-08 | Stop reason: HOSPADM

## 2024-04-08 RX ORDER — MEPERIDINE HYDROCHLORIDE 25 MG/ML
12.5 INJECTION INTRAMUSCULAR; INTRAVENOUS; SUBCUTANEOUS EVERY 5 MIN PRN
Status: DISCONTINUED | OUTPATIENT
Start: 2024-04-08 | End: 2024-04-08 | Stop reason: HOSPADM

## 2024-04-08 RX ORDER — ONDANSETRON 2 MG/ML
INJECTION INTRAMUSCULAR; INTRAVENOUS PRN
Status: DISCONTINUED | OUTPATIENT
Start: 2024-04-08 | End: 2024-04-08 | Stop reason: SDUPTHER

## 2024-04-08 RX ORDER — DOXYCYCLINE HYCLATE 100 MG
100 TABLET ORAL 2 TIMES DAILY
Qty: 14 TABLET | Refills: 0 | Status: SHIPPED | OUTPATIENT
Start: 2024-04-08 | End: 2024-04-15

## 2024-04-08 RX ORDER — HYDROMORPHONE HYDROCHLORIDE 1 MG/ML
0.5 INJECTION, SOLUTION INTRAMUSCULAR; INTRAVENOUS; SUBCUTANEOUS EVERY 5 MIN PRN
Status: DISCONTINUED | OUTPATIENT
Start: 2024-04-08 | End: 2024-04-08 | Stop reason: HOSPADM

## 2024-04-08 RX ORDER — PROPOFOL 10 MG/ML
INJECTION, EMULSION INTRAVENOUS PRN
Status: DISCONTINUED | OUTPATIENT
Start: 2024-04-08 | End: 2024-04-08 | Stop reason: SDUPTHER

## 2024-04-08 RX ORDER — SODIUM CHLORIDE 0.9 % (FLUSH) 0.9 %
5-40 SYRINGE (ML) INJECTION EVERY 12 HOURS SCHEDULED
Status: DISCONTINUED | OUTPATIENT
Start: 2024-04-08 | End: 2024-04-08 | Stop reason: HOSPADM

## 2024-04-08 RX ORDER — HYDROMORPHONE HYDROCHLORIDE 1 MG/ML
0.5 INJECTION, SOLUTION INTRAMUSCULAR; INTRAVENOUS; SUBCUTANEOUS EVERY 5 MIN PRN
Status: COMPLETED | OUTPATIENT
Start: 2024-04-08 | End: 2024-04-08

## 2024-04-08 RX ORDER — GLYCOPYRROLATE 0.2 MG/ML
INJECTION INTRAMUSCULAR; INTRAVENOUS PRN
Status: DISCONTINUED | OUTPATIENT
Start: 2024-04-08 | End: 2024-04-08 | Stop reason: SDUPTHER

## 2024-04-08 RX ORDER — OXYCODONE HYDROCHLORIDE AND ACETAMINOPHEN 5; 325 MG/1; MG/1
1 TABLET ORAL
Status: COMPLETED | OUTPATIENT
Start: 2024-04-08 | End: 2024-04-08

## 2024-04-08 RX ORDER — OXYCODONE HYDROCHLORIDE AND ACETAMINOPHEN 5; 325 MG/1; MG/1
1 TABLET ORAL EVERY 6 HOURS PRN
Qty: 28 TABLET | Refills: 0 | Status: SHIPPED | OUTPATIENT
Start: 2024-04-08 | End: 2024-04-15

## 2024-04-08 RX ADMIN — PROPOFOL 200 MG: 10 INJECTION, EMULSION INTRAVENOUS at 07:35

## 2024-04-08 RX ADMIN — HYDROMORPHONE HYDROCHLORIDE 0.5 MG: 1 INJECTION, SOLUTION INTRAMUSCULAR; INTRAVENOUS; SUBCUTANEOUS at 09:03

## 2024-04-08 RX ADMIN — FENTANYL CITRATE 50 MCG: 50 INJECTION, SOLUTION INTRAMUSCULAR; INTRAVENOUS at 07:35

## 2024-04-08 RX ADMIN — DEXAMETHASONE SODIUM PHOSPHATE 10 MG: 10 INJECTION INTRAMUSCULAR; INTRAVENOUS at 07:44

## 2024-04-08 RX ADMIN — MIDAZOLAM 2 MG: 1 INJECTION INTRAMUSCULAR; INTRAVENOUS at 07:27

## 2024-04-08 RX ADMIN — HYDROMORPHONE HYDROCHLORIDE 0.5 MG: 1 INJECTION, SOLUTION INTRAMUSCULAR; INTRAVENOUS; SUBCUTANEOUS at 09:08

## 2024-04-08 RX ADMIN — HYDROMORPHONE HYDROCHLORIDE 0.5 MG: 1 INJECTION, SOLUTION INTRAMUSCULAR; INTRAVENOUS; SUBCUTANEOUS at 09:18

## 2024-04-08 RX ADMIN — ONDANSETRON HYDROCHLORIDE 4 MG: 2 SOLUTION INTRAMUSCULAR; INTRAVENOUS at 08:18

## 2024-04-08 RX ADMIN — CEFAZOLIN 2000 MG: 2 INJECTION, POWDER, FOR SOLUTION INTRAMUSCULAR; INTRAVENOUS at 07:41

## 2024-04-08 RX ADMIN — OXYCODONE HYDROCHLORIDE AND ACETAMINOPHEN 1 TABLET: 5; 325 TABLET ORAL at 09:35

## 2024-04-08 RX ADMIN — GLYCOPYRROLATE 0.2 MG: 1 INJECTION INTRAMUSCULAR; INTRAVENOUS at 07:33

## 2024-04-08 RX ADMIN — FENTANYL CITRATE 50 MCG: 50 INJECTION, SOLUTION INTRAMUSCULAR; INTRAVENOUS at 07:51

## 2024-04-08 RX ADMIN — SODIUM CHLORIDE: 9 INJECTION, SOLUTION INTRAVENOUS at 05:51

## 2024-04-08 RX ADMIN — PROPOFOL 30 MG: 10 INJECTION, EMULSION INTRAVENOUS at 08:10

## 2024-04-08 RX ADMIN — FENTANYL CITRATE 50 MCG: 50 INJECTION, SOLUTION INTRAMUSCULAR; INTRAVENOUS at 07:42

## 2024-04-08 RX ADMIN — FENTANYL CITRATE 50 MCG: 50 INJECTION, SOLUTION INTRAMUSCULAR; INTRAVENOUS at 08:10

## 2024-04-08 RX ADMIN — LIDOCAINE HYDROCHLORIDE 60 MG: 20 INJECTION, SOLUTION INTRAVENOUS at 07:35

## 2024-04-08 RX ADMIN — HYDROMORPHONE HYDROCHLORIDE 0.5 MG: 1 INJECTION, SOLUTION INTRAMUSCULAR; INTRAVENOUS; SUBCUTANEOUS at 09:13

## 2024-04-08 ASSESSMENT — PAIN DESCRIPTION - DESCRIPTORS
DESCRIPTORS: ACHING;BURNING
DESCRIPTORS: ACHING;BURNING;CRAMPING

## 2024-04-08 ASSESSMENT — PAIN - FUNCTIONAL ASSESSMENT
PAIN_FUNCTIONAL_ASSESSMENT: 0-10

## 2024-04-08 ASSESSMENT — LIFESTYLE VARIABLES: SMOKING_STATUS: 1

## 2024-04-08 NOTE — ANESTHESIA PRE PROCEDURE
04/08/24 78 kg (172 lb)   04/05/24 78 kg (172 lb)   04/02/24 78.9 kg (174 lb)     Body mass index is 32.5 kg/m².    CBC:   Lab Results   Component Value Date/Time    WBC 9.3 04/02/2024 01:30 PM    RBC 4.56 04/02/2024 01:30 PM    HGB 13.5 04/02/2024 01:30 PM    HCT 42.2 04/02/2024 01:30 PM    MCV 92.5 04/02/2024 01:30 PM    RDW 13.8 04/02/2024 01:30 PM     04/02/2024 01:30 PM       CMP:   Lab Results   Component Value Date/Time     04/02/2024 01:30 PM    K 4.1 04/02/2024 01:30 PM    K 4.0 01/21/2023 03:28 PM     04/02/2024 01:30 PM    CO2 26 04/02/2024 01:30 PM    BUN 5 04/02/2024 01:30 PM    CREATININE 0.8 04/02/2024 01:30 PM    GFRAA >60 09/09/2022 02:53 PM    LABGLOM >90 04/02/2024 01:30 PM    GLUCOSE 110 04/02/2024 01:30 PM    PROT 6.9 04/02/2024 01:30 PM    CALCIUM 9.3 04/02/2024 01:30 PM    BILITOT 0.2 04/02/2024 01:30 PM    ALKPHOS 72 04/02/2024 01:30 PM    AST 15 04/02/2024 01:30 PM    ALT 11 04/02/2024 01:30 PM       POC Tests: No results for input(s): \"POCGLU\", \"POCNA\", \"POCK\", \"POCCL\", \"POCBUN\", \"POCHEMO\", \"POCHCT\" in the last 72 hours.    Coags:   Lab Results   Component Value Date/Time    PROTIME 13.1 01/19/2016 05:21 AM    INR 1.2 01/19/2016 05:21 AM    APTT 35.6 01/19/2016 05:21 AM       HCG (If Applicable):   Lab Results   Component Value Date    PREGTESTUR NEGATIVE 01/21/2023        ABGs: No results found for: \"PHART\", \"PO2ART\", \"WMC8BWU\", \"PMW0KAD\", \"BEART\", \"X2VAVBOB\"     Type & Screen (If Applicable):  No results found for: \"LABABO\", \"LABRH\"    Drug/Infectious Status (If Applicable):  No results found for: \"HIV\", \"HEPCAB\"    COVID-19 Screening (If Applicable):   Lab Results   Component Value Date/Time    COVID19 Not Detected 01/05/2024 02:19 AM     EKG : 1/5/24  Ventricular Rate  BPM 61 73 59 57 74 76 85   Atrial Rate  BPM 61 73 59 57 74 76 85   P-R Interval  ms 144 134 130 142 132 134 138   QRS Duration  ms 84 74 82 74 74 82 84   Q-T Interval  ms 412 390 434 434 398 404 372

## 2024-04-08 NOTE — BRIEF OP NOTE
Brief Postoperative Note      Patient: Jesse Jack  YOB: 1985  MRN: [id number]    Date of Procedure: 4/8/2024    Pre-Op Diagnosis Codes:     * Chronic abscess of breast [N61.1]    Post-Op Diagnosis: Same       Procedure(s):  LEFT BREAST CENTRAL LUMPECTOMY    Surgeon(s):  Kylah Gilmore MD    Assistant:  Resident: Arlyn Saldana MD    Anesthesia: General    Estimated Blood Loss (mL): 5cc     Complications: None    Specimens:   ID Type Source Tests Collected by Time Destination   A : Left Breast Recurrent Abcess Tissue Tissue SURGICAL PATHOLOGY Kylah Gilmore MD 4/8/2024 0800        Implants:  * No implants in log *      Drains: * No LDAs found *    Findings:  Infection Present At Time Of Surgery (PATOS) (choose all levels that have infection present):  No infection present  Other Findings: wound dehiscence with granulation tissue.   This procedure was not performed to treat cancer       Electronically signed by Arlyn Saldana MD on 4/8/2024 at 8:47 AM

## 2024-04-08 NOTE — DISCHARGE INSTRUCTIONS
Complete current course of antibiotics then start doxycycline 100mg twice a day for 7 days   Ok to shower starting 4/9   Keep steri strips in place until they fall off     [facility] AMBULATORY PROCEDURE DISCHARGE INSTRUCTIONS  You may be drowsy or lightheaded after receiving sedation or anesthesia.    A responsible person should be with you for the next 24 hours.    Please follow the instructions checked below:    Please leave steri-strips in place until they fall off.      DIET INSTRUCTIONS:  [x]Start with light diet and progress to your normal diet as you feel like eating. If you experience nausea or repeated episodes of vomiting which persist beyond 12-24 hours, notify your doctor.  []Other     ACTIVITY INSTRUCTIONS:  [x]Rest today. Increase activity as tolerated    []Elevate operative limb   [x]No heavy lifting or strenuous activity     [x]No driving while on pain medication  []Other     WOUND/DRESSING INSTRUCTIONS:  Always ensure you and your care giver clean hands before and after caring for the wound.  [x]May shower 24 hrs from procedure      [x]Derma bond dressing-Do not apply lotion, gel, or liquid to wound while the derma bond is in place.   []Other         MEDICATION INSTRUCTIONS:    [x]Prescriptions sent with you.  Use as directed.  When taking pain medications, you may experience dizziness or drowsiness.  Do not drink alcohol or drive when taking these medications.  []You may take a non-prescription “headache remedy”, preferably one that does not contain aspirin.    Other Instructions:      FOLLOW-UP CARE:  [x]Call the office at [phone] for follow-up appointment as previously scheduled    Call physician if they or any other problems occur:  Fever over 101°    Redness, swelling, hardness or warmth at the operative site  Unrelieved nausea    Foul smelling or cloudy drainage at the operative site   Unrelieved pain    Blood soaked dressing. (Some oozing may be normal)

## 2024-04-08 NOTE — OP NOTE
[facility]  OPERATIVE REPORT    Jesse Jack  1985      DATE OF PROCEDURE: 4/8/2024     SURGEON: Kylah Gilmore MD, MSc, FACS     ASSISTANT: DA Saldana MD, PGY-II     PREOPERATIVE DIAGNOSIS:  left breast recurrent abscess with wound dehiscence.     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  She has been on multiple rounds of antibiotics.  We have got her to quit tobacco smoking but she still continues to use marijuana.  She was complaining about so much drainage so we decided to go to the OR to excise the periductal fistulous.  Unfortunately her wound broke down.  We tried packing wet-to-dry but she was not taking very good care of it and was getting worse.  It also was a large open wound and causing her significant pain.  We decided to remove the central portion of her breast.  I explained to her that she was losing her nipple areolar complex.  She had asked for mastectomy but I told her she did not need a mastectomy.    DESCRIPTION OF PROCEDURE: The patient was identified and the procedure was confirmed.  Ancef 2 g IV was given, bilateral seeds were placed, lower Melania hugger applied.  She was prepped and draped in surgical fashion.  I mapped out a parallel a gram incision over the nipple areolar complex to include the large open wound.  Using PEAK PlasmaBlade I made my incision and dissected back to normal healthy breast tissue.  Once this tissue was removed we marked on the back table using 6 colors of paint using the vector marking kit.  I then mobilized the tissue superiorly and laterally and tacked it down with 2-0 Vicryl.  We irrigated copiously with saline.  Achieved hemostasis with PEAK  PlasmaBlade.  We then closed the wound with 3-0 and 4-0 Vicryl in usual fashion.  I cleaned patient wet dry sponge and applied Dermabond and half-inch Steri-Strips.  I did anesthetized the patient then with 1% lidocaine with epinephrine.    Needle, sponge, and instrument counts were reported as correct x2. The patient tolerated the procedure and was transferred to the recovery area in satisfactory condition.    Family was updated of the case.  I explained to her that she has to quit smoking all products.  I explained to them that she can shower tomorrow.  I explained that she should leave the Steri-Strips on until she sees me.  We are giving her another round of antibiotics to help prevent infection while this heals.    Kylah Gilmore MD, MSc, FACS  4/8/2024  10:30 AM

## 2024-04-08 NOTE — PROGRESS NOTES
CLINICAL PHARMACY NOTE: MEDS TO BEDS    Total # of Prescriptions Filled: 2   The following medications were delivered to the patient:  Doxycycline hyclate 100 mg tabs  Oxycodone/apap 5-325    Additional Documentation:   Aunt picked up in the phamacy

## 2024-04-08 NOTE — H&P
GENERAL SURGERY  HISTORY AND PHYSICAL  2024    No chief complaint on file.      TITUS Jack is a 38 y.o. female with history of recurrent breast abscess s/p prior breast aspiration and excision of complex cyst and periductal fistula on 3/26/2024 who presents today for central lumpectomy of the left breast. She reports subjective fever and chills on  and . She states she has been taking antibiotics as prescribed. She reports some drainage on here dressing and had been packing the wound. She states she is still smoking marijuana every night.        Past Medical History:   Diagnosis Date    ADHD     Anxiety     Bipolar disorder (HCC)     Depression     on multiple medications    Seizures (HCC) 2018       Past Surgical History:   Procedure Laterality Date    BREAST SURGERY      Removal of cyst on left-2017    BREAST SURGERY Left 2024    Left breast complex cyst excision performed by Kylah Gilmore MD at [facility] OR     SECTION      x5       Prior to Admission medications    Medication Sig Start Date End Date Taking? Authorizing Provider   acetaminophen (TYLENOL) 500 MG tablet Take 1 tablet by mouth every 6 hours as needed for Pain   Yes Provider, MD Heath   rosuvastatin (CRESTOR) 10 MG tablet Take 1 tablet by mouth daily 3/21/24   Heath Shankar MD   sucralfate (CARAFATE) 1 GM tablet  3/21/24   Heath Shankar MD   cephALEXin (KEFLEX) 250 MG capsule Take 2 capsules by mouth 2 times daily for 7 days 24  Kylah Gilmore MD   doxycycline hyclate (VIBRA-TABS) 100 MG tablet Take 1 tablet by mouth 2 times daily for 7 days 24  Kylah Gilmore MD   mupirocin (BACTROBAN) 2 % ointment Apply topically 2 times daily. 24  Kylah Gilmore MD   SODIUM CHLORIDE, EXTERNAL, 0.9 % SOLN Apply 5 mLs topically daily 24   Kylah Gilmore MD   oxyCODONE-acetaminophen (PERCOCET) 5-325 MG per tablet Take 1 tablet by mouth every 6  hours as needed for Pain for up to 7 days. Intended supply: 7 days. Take lowest dose possible to manage pain Max Daily Amount: 4 tablets 24  Kylah Gilmore MD   Gauze Pads & Dressings 4\"X4\" PADS 1 Pad by Does not apply route daily 24   Kylah Gilmore MD   amphetamine-dextroamphetamine (ADDERALL) 20 MG tablet Take 1 tablet by mouth daily.    Provider, MD Heath   ARIPiprazole (ABILIFY) 30 MG tablet  23   Provider, MD Heath   famotidine (PEPCID) 20 MG tablet Take 1 tablet by mouth 2 times daily for 20 days  Patient not taking: Reported on 2024  Kevin Real DO   ondansetron (ZOFRAN-ODT) 4 MG disintegrating tablet Take 1 tablet by mouth 3 times daily as needed for Nausea or Vomiting  Patient not taking: Reported on 22   Kevin Real DO       Allergies   Allergen Reactions    Pcn [Penicillins] Hives       Family History   Problem Relation Age of Onset    Diabetes Father     Heart Attack Father     Breast Cancer Maternal Aunt 37    Breast Cancer Maternal Grandmother 44    Diabetes Maternal Grandmother     Cancer Maternal Grandfather 60        lung    Diabetes Maternal Grandfather     Diabetes Paternal Grandmother     Breast Cancer Maternal Cousin 36    Cancer Maternal Cousin 38        cervical    Cancer Maternal Cousin 38        cervical    Breast Cancer Other 48        Great materal aunt       Social History     Tobacco Use    Smoking status: Some Days     Current packs/day: 0.00     Average packs/day: 1 pack/day for 16.0 years (16.0 ttl pk-yrs)     Types: Cigarettes     Last attempt to quit: 3/7/2024     Years since quittin.0     Passive exposure: Current    Smokeless tobacco: Never    Tobacco comments:     Pt states she used vapor   Vaping Use    Vaping Use: Never used   Substance Use Topics    Alcohol use: Not Currently     Comment: socially    Drug use: Yes     Types: Marijuana (Weed)     Comment: daily         Review of Systems:  pertinent ROS listed in HPI, all others negative       PHYSICAL EXAM:    Vitals:    04/08/24 0538   BP: 109/64   Pulse: 70   Resp: 20   Temp: 97.3 °F (36.3 °C)   SpO2: 97%       GENERAL:  NAD. A&Ox3.  HEAD:  Normocephalic, atraumatic.   EYES:   No scleral icterus. PERRLA.  LUNGS:  No increased work of breathing.  BREAST: left breast with large wound dehiscence, presence of granulation tissue and exudate.   CARDIOVASCULAR: RR  ABDOMEN:  Soft, non-distended, non-tender. No guarding, rigidity, rebound.      ASSESSMENT/PLAN:  38 y.o. female with history of complex cyst s/p revision and now with wound dehiscence     Central lumpectomy today       Plan will be discussed with Dr. Gilmore.    Arlyn Saldana MD  Surgery Resident PGY-2  4/8/2024  6:34 AM

## 2024-04-08 NOTE — ANESTHESIA POSTPROCEDURE EVALUATION
Department of Anesthesiology  Postprocedure Note    Patient: Jesse Jack  MRN: [id number]  YOB: 1985  Date of evaluation: 4/8/2024    Procedure Summary       Date: 04/08/24 Room / Location: [address]    Anesthesia Start: 0727 Anesthesia Stop:     Procedure: LEFT BREAST CENTRAL LUMPECTOMY (Left: Breast) Diagnosis:       Chronic abscess of breast      (Chronic abscess of breast [N61.1])    Surgeons: Kylah Gilmore MD Responsible Provider: Chano Medina MD    Anesthesia Type: general ASA Status: 3            Anesthesia Type: No value filed.    Prince Phase I: Prince Score: 10    Prince Phase II:      Anesthesia Post Evaluation    Patient location during evaluation: PACU  Patient participation: complete - patient participated  Level of consciousness: awake  Pain score: 3  Airway patency: patent  Nausea & Vomiting: no nausea and no vomiting  Cardiovascular status: blood pressure returned to baseline  Respiratory status: acceptable  Hydration status: euvolemic      No notable events documented.

## 2024-04-09 ENCOUNTER — APPOINTMENT (OUTPATIENT)
Dept: GENERAL RADIOLOGY | Age: 39
End: 2024-04-09
Payer: COMMERCIAL

## 2024-04-09 ENCOUNTER — HOSPITAL ENCOUNTER (EMERGENCY)
Age: 39
Discharge: HOME | End: 2024-04-09
Attending: EMERGENCY MEDICINE
Payer: COMMERCIAL

## 2024-04-09 VITALS
OXYGEN SATURATION: 97 % | RESPIRATION RATE: 16 BRPM | HEART RATE: 60 BPM | TEMPERATURE: 98.2 F | SYSTOLIC BLOOD PRESSURE: 125 MMHG | DIASTOLIC BLOOD PRESSURE: 80 MMHG

## 2024-04-09 DIAGNOSIS — R06.02 SHORTNESS OF BREATH: ICD-10-CM

## 2024-04-09 DIAGNOSIS — R00.2 PALPITATIONS: Primary | ICD-10-CM

## 2024-04-09 LAB
ALBUMIN SERPL-MCNC: 4 G/DL (ref 3.5–5.2)
ALP SERPL-CCNC: 78 U/L (ref 35–104)
ALT SERPL-CCNC: 10 U/L (ref 0–32)
ANION GAP SERPL CALCULATED.3IONS-SCNC: 12 MMOL/L (ref 7–16)
AST SERPL-CCNC: 8 U/L (ref 0–31)
BASOPHILS # BLD: 0.02 K/UL (ref 0–0.2)
BASOPHILS NFR BLD: 0 % (ref 0–2)
BILIRUB SERPL-MCNC: <0.2 MG/DL (ref 0–1.2)
BNP SERPL-MCNC: 368 PG/ML (ref 0–125)
BUN SERPL-MCNC: 11 MG/DL (ref 6–20)
CALCIUM SERPL-MCNC: 9.4 MG/DL (ref 8.6–10.2)
CHLORIDE SERPL-SCNC: 99 MMOL/L (ref 98–107)
CO2 SERPL-SCNC: 22 MMOL/L (ref 22–29)
CREAT SERPL-MCNC: 0.7 MG/DL (ref 0.5–1)
D DIMER: <200 NG/ML DDU (ref 0–232)
EKG ATRIAL RATE: 53 BPM
EKG P AXIS: 46 DEGREES
EKG P-R INTERVAL: 136 MS
EKG Q-T INTERVAL: 432 MS
EKG QRS DURATION: 84 MS
EKG QTC CALCULATION (BAZETT): 405 MS
EKG R AXIS: 28 DEGREES
EKG T AXIS: 18 DEGREES
EKG VENTRICULAR RATE: 53 BPM
EOSINOPHIL # BLD: 0 K/UL (ref 0.05–0.5)
EOSINOPHILS RELATIVE PERCENT: 0 % (ref 0–6)
ERYTHROCYTE [DISTWIDTH] IN BLOOD BY AUTOMATED COUNT: 14.1 % (ref 11.5–15)
GFR SERPL CREATININE-BSD FRML MDRD: >90 ML/MIN/1.73M2
GLUCOSE SERPL-MCNC: 158 MG/DL (ref 74–99)
HCT VFR BLD AUTO: 37.9 % (ref 34–48)
HGB BLD-MCNC: 12.2 G/DL (ref 11.5–15.5)
IMM GRANULOCYTES # BLD AUTO: 0.11 K/UL (ref 0–0.58)
IMM GRANULOCYTES NFR BLD: 1 % (ref 0–5)
LYMPHOCYTES NFR BLD: 1.2 K/UL (ref 1.5–4)
LYMPHOCYTES RELATIVE PERCENT: 9 % (ref 20–42)
MCH RBC QN AUTO: 29.3 PG (ref 26–35)
MCHC RBC AUTO-ENTMCNC: 32.2 G/DL (ref 32–34.5)
MCV RBC AUTO: 91.1 FL (ref 80–99.9)
MONOCYTES NFR BLD: 0.66 K/UL (ref 0.1–0.95)
MONOCYTES NFR BLD: 5 % (ref 2–12)
NEUTROPHILS NFR BLD: 86 % (ref 43–80)
NEUTS SEG NFR BLD: 11.83 K/UL (ref 1.8–7.3)
PLATELET # BLD AUTO: 362 K/UL (ref 130–450)
PMV BLD AUTO: 9.3 FL (ref 7–12)
POTASSIUM SERPL-SCNC: 3.9 MMOL/L (ref 3.5–5)
PROT SERPL-MCNC: 7 G/DL (ref 6.4–8.3)
RBC # BLD AUTO: 4.16 M/UL (ref 3.5–5.5)
SODIUM SERPL-SCNC: 133 MMOL/L (ref 132–146)
TROPONIN I SERPL HS-MCNC: <6 NG/L (ref 0–9)
TSH SERPL DL<=0.05 MIU/L-ACNC: 0.35 UIU/ML (ref 0.27–4.2)
WBC OTHER # BLD: 13.8 K/UL (ref 4.5–11.5)

## 2024-04-09 PROCEDURE — 93005 ELECTROCARDIOGRAM TRACING: CPT

## 2024-04-09 PROCEDURE — 2580000003 HC RX 258

## 2024-04-09 PROCEDURE — 80053 COMPREHEN METABOLIC PANEL: CPT

## 2024-04-09 PROCEDURE — 93010 ELECTROCARDIOGRAM REPORT: CPT | Performed by: INTERNAL MEDICINE

## 2024-04-09 PROCEDURE — 85025 COMPLETE CBC W/AUTO DIFF WBC: CPT

## 2024-04-09 PROCEDURE — 84484 ASSAY OF TROPONIN QUANT: CPT

## 2024-04-09 PROCEDURE — 71045 X-RAY EXAM CHEST 1 VIEW: CPT

## 2024-04-09 PROCEDURE — 99285 EMERGENCY DEPT VISIT HI MDM: CPT

## 2024-04-09 PROCEDURE — 85379 FIBRIN DEGRADATION QUANT: CPT

## 2024-04-09 PROCEDURE — 84443 ASSAY THYROID STIM HORMONE: CPT

## 2024-04-09 PROCEDURE — 83880 ASSAY OF NATRIURETIC PEPTIDE: CPT

## 2024-04-09 RX ORDER — 0.9 % SODIUM CHLORIDE 0.9 %
1000 INTRAVENOUS SOLUTION INTRAVENOUS ONCE
Status: COMPLETED | OUTPATIENT
Start: 2024-04-09 | End: 2024-04-09

## 2024-04-09 RX ADMIN — SODIUM CHLORIDE 1000 ML: 9 INJECTION, SOLUTION INTRAVENOUS at 02:31

## 2024-04-09 ASSESSMENT — LIFESTYLE VARIABLES: HOW OFTEN DO YOU HAVE A DRINK CONTAINING ALCOHOL: NEVER

## 2024-04-09 ASSESSMENT — PAIN - FUNCTIONAL ASSESSMENT: PAIN_FUNCTIONAL_ASSESSMENT: NONE - DENIES PAIN

## 2024-04-09 NOTE — ED PROVIDER NOTES
DISPOSITION  Disposition: Discharge to home  Patient condition is stable    NOTE: This report was transcribed using voice recognition software. Every effort was made to ensure accuracy; however, inadvertent computerized transcription errors may be present.    Also please note that patient was seen and examined by attending physician. Plan of care and disposition discussed with attending physician and they were immediately available or present for all procedures performed.       -- Letitia Pineda D.O. PGY-2     Emergency Medicine      4/9/2024 4:27 AM    ATTENDING PROVIDER ATTESTATION:     I have personally performed and/or participated in the history, exam, medical decision making, and procedures and agree with all pertinent clinical information.      I have also reviewed and agree with the past medical, family and social history unless otherwise noted.    I have discussed this patient in detail with the resident, and provided the instruction and education regarding sob.    My findings/Plan: I was the primary provider for the patient.  Patient with history of ADHD anxiety bipolar disease depression seizure disorder with recent history of breast surgery.  Patient reports recent breast mass removal the day before.  Patient reporting feeling short of breath as well as feeling her heart was racing.  Patient reporting no dizziness she reports a syncopal event she reports no chest pain or abdominal pain.  Patient reporting no leg pain or swelling she reports no productive cough there is no urinary symptoms.  Patient is awake alert oriented x 3 heart exam normal lungs are clear abdomen soft nontender.  Patient has normal strength in all extremity she has no edema.  Patient gait steady and normal.  Patient does have history tobacco use.  Patient patient was last seen on April 2, 2024 for breast abscess postoperative pain.  Patient differential includes PE as well as anxiety as well as pneumothorax as well as

## 2024-04-10 ENCOUNTER — TELEPHONE (OUTPATIENT)
Dept: SURGERY | Age: 39
End: 2024-04-10

## 2024-04-10 NOTE — TELEPHONE ENCOUNTER
I called Jesse to see how she is doing since I am leaving town soon.  She states she has a lot of pain but doing ok.  She really wants/needs a front closing bra.  I told her to use an ACE bandage tonight and I will try to get her a front-closing bra from the OR tomorrow.

## 2024-04-11 ENCOUNTER — TELEPHONE (OUTPATIENT)
Dept: BREAST CENTER | Age: 39
End: 2024-04-11

## 2024-04-11 NOTE — TELEPHONE ENCOUNTER
RN contacted patient and notified we got a front closing sports bra from the OR for her to come to the office and . RN instructed patient the the girls as the  of [facility] will have it for her. Patient verbalized understanding.         Electronically signed by Jeri Acevedo RN on 4/11/24 at 9:37 AM EDT

## 2024-04-11 NOTE — TELEPHONE ENCOUNTER
Patient called to let us know she noticed a small amount of drainage today that seeped through her ace bandage. She describes it as light tan with a blood tinge color. She states there is very little drainage coming from the area now. She has been using an ACE wrap as recommended and bra that she was given to her from the hospital. She states the bra is a little tight and uncomfortable. I informed her that she can always find a better fitting bra at [facility] or [facility] if she would like to look at other options. Patient agrees that her drainage is not alarming but wanted to let us know of this update. I thanked her for the call and asked her to let us know if anything changes moving forward.

## 2024-04-16 ENCOUNTER — OFFICE VISIT (OUTPATIENT)
Dept: BREAST CENTER | Age: 39
End: 2024-04-16

## 2024-04-16 VITALS
HEIGHT: 61 IN | WEIGHT: 176 LBS | SYSTOLIC BLOOD PRESSURE: 124 MMHG | BODY MASS INDEX: 33.23 KG/M2 | TEMPERATURE: 98.3 F | OXYGEN SATURATION: 99 % | HEART RATE: 76 BPM | RESPIRATION RATE: 16 BRPM | DIASTOLIC BLOOD PRESSURE: 78 MMHG

## 2024-04-16 DIAGNOSIS — N61.1 BREAST ABSCESS: ICD-10-CM

## 2024-04-16 DIAGNOSIS — G89.18 POSTOPERATIVE PAIN: Primary | ICD-10-CM

## 2024-04-16 PROCEDURE — 99024 POSTOP FOLLOW-UP VISIT: CPT | Performed by: SURGERY

## 2024-04-16 RX ORDER — LEVOFLOXACIN 500 MG/1
500 TABLET, FILM COATED ORAL DAILY
Qty: 7 TABLET | Refills: 0 | Status: SHIPPED | OUTPATIENT
Start: 2024-04-16 | End: 2024-04-23

## 2024-04-16 RX ORDER — METRONIDAZOLE 500 MG/1
500 TABLET ORAL 3 TIMES DAILY
Qty: 21 TABLET | Refills: 0 | Status: SHIPPED | OUTPATIENT
Start: 2024-04-16 | End: 2024-04-23

## 2024-04-16 RX ORDER — OXYCODONE HYDROCHLORIDE AND ACETAMINOPHEN 5; 325 MG/1; MG/1
1 TABLET ORAL EVERY 6 HOURS PRN
Qty: 28 TABLET | Refills: 0 | Status: SHIPPED | OUTPATIENT
Start: 2024-04-16 | End: 2024-04-23

## 2024-04-16 RX ORDER — ONDANSETRON 4 MG/1
4 TABLET, FILM COATED ORAL 3 TIMES DAILY PRN
Qty: 30 TABLET | Refills: 0 | Status: SHIPPED | OUTPATIENT
Start: 2024-04-16

## 2024-04-16 NOTE — PROGRESS NOTES
[facility]  PROGRESS NOTE  ATTENDING NOTE    Chief Complaint   Patient presents with    Post-Op Check     P/O   incision check, patient states pain is increasing.      S: 39-year-old female who had this persistent left breast abscess and fistulous who underwent complex cyst excision on March 26, 2024.  This was complicated by complete wound breakdown and returning to the OR for central lumpectomy on April 8, 2024.  She had been doing well for about a week postop and then yesterday started having some drainage from the wound.  She denies fevers or chills.  She states she has some pain in the breast.  She states it feels heavy.  She showed me the drainage and she had part of a ABD pad saturated with dark brown fluid and that had been present since 830 this morning.  Starts jobs this week.  Stopped using THC.      /78 (Site: Right Upper Arm, Position: Sitting, Cuff Size: Medium Adult)   Pulse 76   Temp 98.3 °F (36.8 °C) (Temporal)   Resp 16   Ht 1.549 m (5' 1\")   Wt 79.8 kg (176 lb)   LMP 03/16/2024 (Exact Date) Comment: poic preg in pre op negative  SpO2 99%   BMI 33.25 kg/m²   Gen:  NAD  Left breast:  appears she had a hematoma by the coloration of the skin that is now breaking down and draining some through the middle of the wound.  There is no palpable seroma to drain.  There is a slight pink hue to the inferior aspect of the breast but no overt signs of infection    ASSESSMENT/PLAN:  Chronic abscess s/p central lumpectomy (left)  --stop doxy and start levquin/flagyl x 7 days  --refill percocet  --steristrips are still very well adhered, left in place and just cover with gauze.  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    --advised if washing dishes at [employer] to refrain from soaking the wound with water constantly  --ok to continue to shower.    --RTC Friday    Kylah Gilmore MD, MSc, FACS  4/16/2024  5:05 PM

## 2024-04-19 ENCOUNTER — OFFICE VISIT (OUTPATIENT)
Dept: BREAST CENTER | Age: 39
End: 2024-04-19

## 2024-04-19 VITALS
WEIGHT: 176 LBS | RESPIRATION RATE: 14 BRPM | TEMPERATURE: 97.5 F | HEIGHT: 61 IN | HEART RATE: 89 BPM | BODY MASS INDEX: 33.23 KG/M2 | SYSTOLIC BLOOD PRESSURE: 129 MMHG | DIASTOLIC BLOOD PRESSURE: 86 MMHG

## 2024-04-19 DIAGNOSIS — N61.1 BREAST ABSCESS: Primary | ICD-10-CM

## 2024-04-19 LAB — SURGICAL PATHOLOGY REPORT: NORMAL

## 2024-04-19 PROCEDURE — 99024 POSTOP FOLLOW-UP VISIT: CPT | Performed by: SURGERY

## 2024-04-19 NOTE — PROGRESS NOTES
[facility]  PROGRESS NOTE  ATTENDING NOTE    Chief Complaint   Patient presents with    Post-Op Check     Patient states having right nipple pain concerned about developing abscess. Completed other medicine because was unable to get it. Stopping to  new medicine today after appointment. Patient feels area has gotten worse. Patient states still having drainage.      37y/o F s/p central lumpectomy for persistent large abscess and wound dehiscence.  She presents for f/u.  Continues to have drainage.  C/o pain in the right breast    /86 (Site: Right Upper Arm, Position: Sitting, Cuff Size: Medium Adult)   Pulse 89   Temp 97.5 °F (36.4 °C) (Temporal)   Resp 14   Ht 1.549 m (5' 1\")   Wt 79.8 kg (176 lb)   LMP 03/16/2024 (Exact Date) Comment: poic preg in pre op negative  BMI 33.25 kg/m²   Gen:  NAD  Right breast: no signs of infection, no palpable masses  Left breast:  middle of incision wound dehiscence with drainage of seroma cavity.  No signs of infection.  Steri-strips fully removed.  Wound pack with 1/2\" packing strip.  Dermabond placed over closed area of wound.      --patient advised to remove about 1\" of packing strip each day and trim.  Provided with suture kit  --patient advised to take sponge baths  --cover wound with gauze each day and change as needed.    --RTC Monday in [place] at 2:30pm    Kylah Gilmore MD, MSc, FACS  4/19/2024  5:26 PM

## 2024-04-22 ENCOUNTER — OFFICE VISIT (OUTPATIENT)
Dept: SURGERY | Age: 39
End: 2024-04-22

## 2024-04-22 VITALS
RESPIRATION RATE: 22 BRPM | HEART RATE: 101 BPM | WEIGHT: 178 LBS | DIASTOLIC BLOOD PRESSURE: 99 MMHG | HEIGHT: 61 IN | SYSTOLIC BLOOD PRESSURE: 152 MMHG | TEMPERATURE: 97.9 F | BODY MASS INDEX: 33.61 KG/M2

## 2024-04-22 DIAGNOSIS — G89.18 POSTOPERATIVE PAIN: ICD-10-CM

## 2024-04-22 DIAGNOSIS — S21.002A WOUND OF LEFT BREAST, INITIAL ENCOUNTER: Primary | ICD-10-CM

## 2024-04-22 PROCEDURE — 99024 POSTOP FOLLOW-UP VISIT: CPT | Performed by: SURGERY

## 2024-04-22 RX ORDER — OXYCODONE HYDROCHLORIDE AND ACETAMINOPHEN 5; 325 MG/1; MG/1
1 TABLET ORAL EVERY 6 HOURS PRN
Qty: 28 TABLET | Refills: 0 | Status: SHIPPED | OUTPATIENT
Start: 2024-04-23 | End: 2024-04-30

## 2024-04-22 NOTE — PROGRESS NOTES
[facility]  PROGRESS NOTE  ATTENDING NOTE    Chief Complaint   Patient presents with    Post-Op Check     PO - Incision check, left breast simple lumpectomy. Pt c/o severe pain 10/10. Pt states there is pinkish brown drainage. C/o headaches, burning sensations     S: 38-year-old female who has had this recurrent problem of her left breast with abscesses.  She has had multiple surgeries now.  I saw her last week and packed the wound.  She has an open wound in the midportion of her incision.  She smells of marijuana today.  I told her she smells of marijuana she needs to stop smoking it.  She states that not her and her son was smoking.  I explained that if she is around cigarette smoke or marijuana smoke it still affects her.  She has been changing the dressing about 3 times a day.  She still complains of a lot of pain.  She is not really staying on topic today because she is concerned about her job and losing her kids.  She had me write her 2 letters today 1 to child protective services and the other to her job to help get her children back and keep her job.    BP (!) 152/99 (Site: Left Upper Arm, Position: Sitting, Cuff Size: Medium Adult)   Pulse (!) 101   Temp 97.9 °F (36.6 °C)   Resp 22   Ht 1.549 m (5' 1\")   Wt 80.7 kg (178 lb)   LMP 03/16/2024 (Exact Date) Comment: poic preg in pre op negative  BMI 33.63 kg/m²   Gen:  NAD  Left breast:  wound has opened more in the middle.  There is now a second small wound more medial.  There is some necrotic fat in the lateral aspect of the open wound.  I debrided this.  It was only about 1 x 2 cm.  There is breast tissue or subcutaneous tissue exposed.  I packed the wound with wet-to-dry gauze.    The wound of the left breast is 8cm long and 3cm wide and 5cm deep.  There is fat and breast tissue exposed.  There is minimal yellow exudate.  There is undermining of 5cm medially.      ASSESSMENT/PLAN:  Recurrent breast abscesses--status post

## 2024-04-23 PROBLEM — T81.30XA WOUND DEHISCENCE: Status: ACTIVE | Noted: 2024-04-23

## 2024-04-26 DIAGNOSIS — T81.30XA WOUND DEHISCENCE: Primary | ICD-10-CM

## 2024-04-29 ENCOUNTER — TELEPHONE (OUTPATIENT)
Dept: BREAST CENTER | Age: 39
End: 2024-04-29

## 2024-04-29 ENCOUNTER — OFFICE VISIT (OUTPATIENT)
Dept: BREAST CENTER | Age: 39
End: 2024-04-29

## 2024-04-29 VITALS
HEIGHT: 61 IN | WEIGHT: 178 LBS | DIASTOLIC BLOOD PRESSURE: 78 MMHG | BODY MASS INDEX: 33.61 KG/M2 | OXYGEN SATURATION: 99 % | SYSTOLIC BLOOD PRESSURE: 142 MMHG | RESPIRATION RATE: 14 BRPM | HEART RATE: 97 BPM | TEMPERATURE: 98.2 F

## 2024-04-29 DIAGNOSIS — T81.30XA WOUND DEHISCENCE: Primary | ICD-10-CM

## 2024-04-29 DIAGNOSIS — G89.18 POSTOPERATIVE PAIN: Primary | ICD-10-CM

## 2024-04-29 PROCEDURE — 99024 POSTOP FOLLOW-UP VISIT: CPT | Performed by: SURGERY

## 2024-04-29 RX ORDER — ONDANSETRON 4 MG/1
4 TABLET, ORALLY DISINTEGRATING ORAL 3 TIMES DAILY PRN
Qty: 21 TABLET | Refills: 0 | Status: SHIPPED | OUTPATIENT
Start: 2024-04-29

## 2024-04-29 RX ORDER — OXYCODONE HYDROCHLORIDE AND ACETAMINOPHEN 5; 325 MG/1; MG/1
1 TABLET ORAL EVERY 6 HOURS PRN
Qty: 28 TABLET | Refills: 0 | Status: SHIPPED | OUTPATIENT
Start: 2024-04-29 | End: 2024-05-06

## 2024-04-29 NOTE — TELEPHONE ENCOUNTER
RN contacted [facility] and spoke to Mirian and relayed all information. Mirian stated they will see patient on Saturday then get her on a Mon/Wed/Fri rotation the following week. RN verbalized understanding. Mirian verified referral in computer was correct. RN informed Mirian in office today we used 2 adaptics over the black sponge prior to placing. Mirian verbalized understanding.         Electronically signed by Jeri Acevedo RN on 4/29/24 at 3:05 PM EDT

## 2024-04-29 NOTE — TELEPHONE ENCOUNTER
RN received a call from Velma at [facility] in regards to patients new home health care orders. They are unable to see patient at the soonest Saturday 5/4/24. Velma also needs further instructions for patient wound vac. She needs to know what color foam? What suction set to? How often it needs changed? Also she stated they need alternative dressing instructions to where if they have issues with the suction on the wound vac. RN advised  is in the OR currently and patient has an appointment with us on Monday so I will get further instructions then .       Velma requested return call at [phone]

## 2024-04-29 NOTE — PROGRESS NOTES
[facility]  PROGRESS NOTE  ATTENDING NOTE    Chief Complaint   Patient presents with    Wound Check     Wound vac placement, left breast     S:  39y/o F with recurrent abscess of left breast s/p debridements and central lumpectomy.  Now with wound dehiscence.  She has been doing wet to dry, but we have acquired a wound vac for her.      BP (!) 142/78 (Site: Right Upper Arm, Position: Sitting, Cuff Size: Medium Adult)   Pulse 97   Temp 98.2 °F (36.8 °C) (Temporal)   Resp 14   Ht 1.549 m (5' 1\")   Wt 80.7 kg (178 lb)   LMP 03/16/2024 (Exact Date) Comment: poic preg in pre op negative  SpO2 99%   BMI 33.63 kg/m²   Gen: NAD  Left breast.  Small sponge cut to size.  Wrapped with adaptic.  Placed in wound and transparent dressing applied.  Wound vac attached and suctioned to -125mmHg.    Breast tissue exposed prior to wound vac placement. Small amount of necrotic tissue present.  No signs of infection    ASSESSMENT/PLAN:  Wound dehiscence after central lumpectomy  --wound vac placed  --RTC this week for wound vac change  --[facility] set up    Kylah Gilmore MD, MSc, FACS  4/29/2024  5:54 PM

## 2024-04-30 ENCOUNTER — TELEPHONE (OUTPATIENT)
Dept: BREAST CENTER | Age: 39
End: 2024-04-30

## 2024-04-30 NOTE — TELEPHONE ENCOUNTER
----- Message from Kylah Gilmore MD sent at 4/30/2024 10:25 AM EDT -----  Wet to dry gauze  ----- Message -----  From: Lucero Trotter RN  Sent: 4/30/2024  10:21 AM EDT  To: Kylah Gilmore MD    Hi Velma Stiles from [facility] called needing one more thing addressed. They need to know what they are to do in the meantime if the wound vac malfunctions. I can call her back and give her verbal orders unless you would like to put a note in her chart, then I can send it over. I can let her know either way. It's likely they will need a signature regardless.    Thank you,  Lucero

## 2024-04-30 NOTE — TELEPHONE ENCOUNTER
Notified Velma with [facility] that patient will need wet to dry gauze in the event her wound vac malfunctions. They will likely send over orders to be signed once they have them in place.

## 2024-05-02 ENCOUNTER — OFFICE VISIT (OUTPATIENT)
Dept: BREAST CENTER | Age: 39
End: 2024-05-02

## 2024-05-02 ENCOUNTER — TELEPHONE (OUTPATIENT)
Dept: BREAST CENTER | Age: 39
End: 2024-05-02

## 2024-05-02 VITALS
DIASTOLIC BLOOD PRESSURE: 78 MMHG | SYSTOLIC BLOOD PRESSURE: 132 MMHG | WEIGHT: 178 LBS | HEART RATE: 97 BPM | OXYGEN SATURATION: 98 % | BODY MASS INDEX: 33.61 KG/M2 | TEMPERATURE: 98.3 F | HEIGHT: 61 IN | RESPIRATION RATE: 22 BRPM

## 2024-05-02 DIAGNOSIS — T81.30XA WOUND DEHISCENCE: Primary | ICD-10-CM

## 2024-05-02 PROCEDURE — 99024 POSTOP FOLLOW-UP VISIT: CPT | Performed by: SURGERY

## 2024-05-02 NOTE — TELEPHONE ENCOUNTER
RN contacted [facility] and spoke to Lana. RN informed Lana that patient has an appointment with us on 05/10/2024 and we will do the wound vac care that day while patient is here. RN also informed Lana that doctor is no longer using the adaptic around the sponge. Lana verbalized understanding.      Electronically signed by Jeri Acevedo RN on 5/2/24 at 3:14 PM EDT

## 2024-05-02 NOTE — PROGRESS NOTES
[facility]  PROGRESS NOTE  ATTENDING NOTE    Chief Complaint   Patient presents with    wound vac     Wound vac change     Wound vac removed.  There was a foul odor but no other signs of infection.  Likely just the tissue not being able to breathe as well with the adaptic on.  The patient did not have the suction canister hooked up to the VAC dressing.  She states she tripped over the tubing last night and it ripped off of her.  I used saline soaked gauze to remove the transparent drape and the sponge.  We cleaned the breast with saline.  She has already formed a lot of granulation tissue.  There is minimal breast tissue visible.  We cleaned the patient multiple times with saline.  I then fashioned a black sponge to about the size of the wound.  I attached the transparent dressing and suction to -125 mmHg suction.  Tolerated well.    RTC next Friday    Kylah Gilmore MD, MSc, FACS  5/2/2024  4:56 PM

## 2024-05-03 ENCOUNTER — TELEPHONE (OUTPATIENT)
Dept: BREAST CENTER | Age: 39
End: 2024-05-03

## 2024-05-03 NOTE — TELEPHONE ENCOUNTER
Patient called stating her dressing is no longer tight to her skin and it appears that her wound vac is no longer \"sucking\". Patient has restarted her machine and checked the tubes. The machine is also beeping as if there is an \"error\". Patient felt that she did everything she could to try and get it working again.    I reached out to [facility] to see if they have any cancellations today where they can stop in to see the patient. A nurse is scheduled to come by her house tomorrow. They stated that they will let scheduling know in case there are any cancellations however it's unlikely that someone will be able to come by today.     I called the patient back to let her know the above update. I also informed her that the tape may not be completely sealed on part of the skin which could be a cause of the malfunction. Patient agrees that this could be an issue and states her friend who is a nurse will be over to check it today. I informed her that if there is tape that has lifted or not completely stuck to her skin then she will need to reapply or reinforce more tape to the area.

## 2024-05-06 ENCOUNTER — TELEPHONE (OUTPATIENT)
Dept: BREAST CENTER | Age: 39
End: 2024-05-06

## 2024-05-06 NOTE — TELEPHONE ENCOUNTER
Patient called to let us know her wound vac is not on. She has been having issues with it all weekend. She said that Home Health did not come on Saturday. I informed the patient that I would reach out to home health and call her back.      Spoke with Tiny (nurse) at [facility]. Tiny stated that she does not see any notes that the patient was seen on Saturday. I also informed her that patient has also been set up for Monday, Wednesday, Friday visits starting today. Tiny will look into this and call us back. Call back number provided.     Will update Dr. Gilmore.

## 2024-05-06 NOTE — TELEPHONE ENCOUNTER
Patient called and let us know that [facility] will be there today after 5pm. I asked the patient to confirm with them that she is on their weekly schedule for Monday, Wednesday and Friday as confirmed previously with Home Care. Patient will still be in this Friday, May 10th to see Dr. Gilmore for her follow up so home care does not need to see her this Friday, specifically.     Patient asked me to reach out to Dr. Gilmore about refilling her pain medication. She has 4 pills left. Will forward to Dr. Gilmore.

## 2024-05-06 NOTE — TELEPHONE ENCOUNTER
12:04pm - Left message with [facility] to check back on the status of patient being seen today.     12:40pm - Left message with [facility] (manager's line) in attempt to follow up again.

## 2024-05-06 NOTE — TELEPHONE ENCOUNTER
UPDATE ON HOME CARE:    Tiny from [facility] contacted our office today 5/6/24 @ 2:20pm stated that [facility] will be going out to patient's house today to change the wound vac.

## 2024-05-06 NOTE — TELEPHONE ENCOUNTER
Contacted patient to let her know that I have not heard back from Home Care yet. I informed her that Dr. Gilmore can see her today between 1-3pm if Home Care is not able to go today. ([place] office) Patient will look into transportation and call us back.

## 2024-05-07 DIAGNOSIS — G89.18 POST-OP PAIN: Primary | ICD-10-CM

## 2024-05-07 RX ORDER — OXYCODONE HYDROCHLORIDE AND ACETAMINOPHEN 5; 325 MG/1; MG/1
1 TABLET ORAL EVERY 8 HOURS PRN
Qty: 21 TABLET | Refills: 0 | Status: SHIPPED | OUTPATIENT
Start: 2024-05-07 | End: 2024-05-14

## 2024-05-10 ENCOUNTER — OFFICE VISIT (OUTPATIENT)
Dept: BREAST CENTER | Age: 39
End: 2024-05-10

## 2024-05-10 VITALS
WEIGHT: 170 LBS | HEART RATE: 93 BPM | OXYGEN SATURATION: 99 % | SYSTOLIC BLOOD PRESSURE: 128 MMHG | HEIGHT: 61 IN | RESPIRATION RATE: 20 BRPM | DIASTOLIC BLOOD PRESSURE: 84 MMHG | BODY MASS INDEX: 32.1 KG/M2 | TEMPERATURE: 98.4 F

## 2024-05-10 DIAGNOSIS — T81.30XA WOUND DEHISCENCE: Primary | ICD-10-CM

## 2024-05-10 PROCEDURE — 99024 POSTOP FOLLOW-UP VISIT: CPT | Performed by: SURGERY

## 2024-05-10 NOTE — PROGRESS NOTES
[facility]  PROGRESS NOTE  ATTENDING NOTE    Chief Complaint   Patient presents with    Wound Check     Wound check - Vac     Patient has already removed wound VAC.  The area looks clean.  Little bit of slough was removed with a gauze pad.  There is a little bit of fat necrosis that I removed with sharp dissection about a centimeter by centimeter.  I then cut a small sponge in half and cut it to size.  I applied the transparent dressing.  We had a good suction and a -125 mmHg.  She tolerated well.    Return to clinic 2 weeks.    Kylah Gilmore MD, MSc, FACS  5/10/2024  2:58 PM

## 2024-05-13 ENCOUNTER — TELEPHONE (OUTPATIENT)
Dept: BREAST CENTER | Age: 39
End: 2024-05-13

## 2024-05-13 DIAGNOSIS — G89.18 POST-OP PAIN: ICD-10-CM

## 2024-05-13 RX ORDER — OXYCODONE HYDROCHLORIDE AND ACETAMINOPHEN 5; 325 MG/1; MG/1
1 TABLET ORAL EVERY 12 HOURS PRN
Qty: 14 TABLET | Refills: 0 | Status: SHIPPED | OUTPATIENT
Start: 2024-05-13 | End: 2024-05-20

## 2024-05-13 NOTE — TELEPHONE ENCOUNTER
Received call from patient stating she is experiencing severe pain in her left breast area radiating to her shoulder. She states the pain increased in intensity on Saturday. She states she does not know if it is from the \"ripped Stitch\" or what but it describes it as intense.she states she showered today and removed the vacuum. She is waiting for home care to come to change her dressing. Asked patient to have home health evaluate the wound and to call our office. In the meantime I will send a message to Dr Gilmore. Patient is also requesting a refill on her pain medication. She takes percocet and uses [facility].    The patient rupa be reached at [phone]    Electronically signed by Nathaly Masters RN on 5/13/24 at 8:51 AM EDT

## 2024-05-13 NOTE — TELEPHONE ENCOUNTER
Patient returned call. She still states she is worried about infection. She states the pain goes down her arm. She is willing to try and keep with the wound vac and will call [facility] to have that placed back on. She will also come Friday 5/17/24 at 1130 am for Dr Gilmore to assess her wound. Patient knows to bring her wound vac supplies.    Electronically signed by Nathaly Masters RN on 5/13/24 at 12:47 PM EDT

## 2024-05-13 NOTE — TELEPHONE ENCOUNTER
Received call from [facility] nurse, Susannah in regards to this patient's left breast wound. She states she came to change the wound vac but decided on a wet to dry dressing. She states the patient is experiencing intense pain and the wound does look different than last Wednesday. She states the left hand corner area shows beige brown sloth area about the size of a nickel. She also states there is another spot below than area. She states they can come back to place the wound vac if needed today. If  not, their plans are to come Wednesday. Told Susannah I will call her back with any other instructions from Dr Gilmore.  Susannah's number is [phone] for any questions.    Electronically signed by Nathaly Masters RN on 5/13/24 at 11:26 AM EDT

## 2024-05-13 NOTE — TELEPHONE ENCOUNTER
Called [facility] nurse, Susannah and left detailed message with call back number to discuss this patient's wound vac versus wet to dry dressings.    Also attempted to call patient to discuss her wound vac versus wet to dry dressings and to discuss her coming Friday for wound vac and wound assessment with Dr Gilmore.left detailed message with call back number to discuss.    Electronically signed by Nathaly Masters RN on 5/13/24 at 12:11 PM EDT

## 2024-05-17 ENCOUNTER — OFFICE VISIT (OUTPATIENT)
Dept: BREAST CENTER | Age: 39
End: 2024-05-17

## 2024-05-17 ENCOUNTER — TELEPHONE (OUTPATIENT)
Dept: BREAST CENTER | Age: 39
End: 2024-05-17

## 2024-05-17 VITALS
WEIGHT: 175 LBS | OXYGEN SATURATION: 100 % | BODY MASS INDEX: 33.07 KG/M2 | RESPIRATION RATE: 20 BRPM | HEART RATE: 76 BPM | TEMPERATURE: 97.6 F

## 2024-05-17 DIAGNOSIS — T81.30XA WOUND DEHISCENCE: Primary | ICD-10-CM

## 2024-05-17 PROCEDURE — 99024 POSTOP FOLLOW-UP VISIT: CPT | Performed by: SURGERY

## 2024-05-17 NOTE — TELEPHONE ENCOUNTER
Received message from patient on office machine that her ride did not show and had her down for 130 pm . Patient states she does not have the wound vac in place and has been doing wet to dry dressing. When asked she said she is willing to have the wound vac place on again. Patient has friend over and can come to breast clinic now. This was confirmed with Dr Gilmore and patient on way with her supplies.    Electronically signed by Nathaly Masters RN on 5/17/24 at 1:26 PM EDT

## 2024-05-18 NOTE — PROGRESS NOTES
[facility]  PROGRESS NOTE  ATTENDING NOTE    Chief Complaint   Patient presents with    Wound Check     Reapply wound vac     There is evidence of healing.  The wound was cleaned with saline.  There was a stitch protruding through the wound that was removed.  There is a small amount of fatty breast tissue was debrided laterally.  The small wound vac sponge was cut to size and placed in the wound.  The dressing applied and the sponge suctioned to -125mmHg. She tolerated well    RTC 1 week    Kylah Gilmore MD, MSc, FACS  5/18/2024  1:37 PM

## 2024-05-22 ENCOUNTER — OFFICE VISIT (OUTPATIENT)
Dept: FAMILY MEDICINE CLINIC | Age: 39
End: 2024-05-22

## 2024-05-22 VITALS
SYSTOLIC BLOOD PRESSURE: 116 MMHG | OXYGEN SATURATION: 100 % | DIASTOLIC BLOOD PRESSURE: 78 MMHG | WEIGHT: 174.8 LBS | HEART RATE: 78 BPM | BODY MASS INDEX: 33.03 KG/M2 | TEMPERATURE: 98.5 F

## 2024-05-22 DIAGNOSIS — M54.41 ACUTE BILATERAL LOW BACK PAIN WITH BILATERAL SCIATICA: Primary | ICD-10-CM

## 2024-05-22 DIAGNOSIS — M54.42 ACUTE BILATERAL LOW BACK PAIN WITH BILATERAL SCIATICA: Primary | ICD-10-CM

## 2024-05-22 DIAGNOSIS — R30.0 DYSURIA: ICD-10-CM

## 2024-05-22 LAB
BILIRUBIN, POC: NEGATIVE
BLOOD URINE, POC: NEGATIVE
CLARITY, POC: CLEAR
COLOR, POC: YELLOW
CONTROL: NORMAL
GLUCOSE URINE, POC: NEGATIVE
KETONES, POC: NEGATIVE
LEUKOCYTE EST, POC: NEGATIVE
NITRITE, POC: NEGATIVE
PH, POC: 8.5
PREGNANCY TEST URINE, POC: NEGATIVE
PROTEIN, POC: NORMAL
SPECIFIC GRAVITY, POC: 1.02
UROBILINOGEN, POC: 0.2

## 2024-05-22 RX ORDER — METHYLPREDNISOLONE ACETATE 80 MG/ML
80 INJECTION, SUSPENSION INTRA-ARTICULAR; INTRALESIONAL; INTRAMUSCULAR; SOFT TISSUE ONCE
Status: COMPLETED | OUTPATIENT
Start: 2024-05-22 | End: 2024-05-22

## 2024-05-22 RX ORDER — TIZANIDINE 2 MG/1
2 TABLET ORAL 4 TIMES DAILY PRN
Qty: 12 TABLET | Refills: 0 | Status: SHIPPED | OUTPATIENT
Start: 2024-05-22

## 2024-05-22 RX ADMIN — METHYLPREDNISOLONE ACETATE 80 MG: 80 INJECTION, SUSPENSION INTRA-ARTICULAR; INTRALESIONAL; INTRAMUSCULAR; SOFT TISSUE at 16:29

## 2024-05-22 NOTE — PROGRESS NOTES
24  Jesse Jack : 1985 Sex: female  Age 38 y.o.      Subjective:  Chief Complaint   Patient presents with    Lower Back Pain         HPI:   HPI  Jesse Jack , 38 y.o. female presents to [facility] care for evaluation of bilateral low back pain.  The patient started with the symptoms over the last couple of days.  She notes that the pain does go down her legs.  The patient has not any fevers, chills.  No chest pain, shortness of breath.  The patient does note that she has some abdominal bloating and when she drank a lot of fluids that she felt that it was causing some pressure in her bladder and into her back.  The patient is not having any flank pain.  The patient is not vomiting.  The patient is not really having any UTI-like symptoms.  She is not having any traumatic injury.  No bladder or bowel incontinence, urinary retention or saddle anesthesia.      ROS:   Unless otherwise stated in this report the patient's positive and negative responses for review of systems for constitutional, eyes, ENT, cardiovascular, respiratory, gastrointestinal, neurological, , musculoskeletal, and integument systems and related systems to the presenting problem are either stated in the history of present illness or were not pertinent or were negative for the symptoms and/or complaints related to the presenting medical problem.  Positives and pertinent negatives as per HPI.  All others reviewed and are negative.      PMH:     Past Medical History:   Diagnosis Date    ADHD     Anxiety     Bipolar disorder (HCC)     Depression     on multiple medications    Seizures (HCC) 2018       Past Surgical History:   Procedure Laterality Date    BREAST LUMPECTOMY Left 2024    LEFT BREAST CENTRAL LUMPECTOMY performed by Kylah Gil[place] [facility] [place]    BREAST SURGERY      Removal of cyst on left-2017    BREAST SURGERY Left 2024    Left breast complex cyst excision performed by [facility]

## 2024-05-24 LAB
CULTURE: NO GROWTH
SPECIMEN DESCRIPTION: NORMAL

## 2024-05-24 NOTE — RESULT ENCOUNTER NOTE
If she is still having the discomfort with the urination and pain I would have her go to the emergency department for labs and a CT scan.

## 2024-05-29 ENCOUNTER — TELEPHONE (OUTPATIENT)
Dept: BREAST CENTER | Age: 39
End: 2024-05-29

## 2024-05-29 NOTE — TELEPHONE ENCOUNTER
RN received a call from Luh at [facility] in regards to patients wound vac care. Patient is refusing the wound vac as she is not allowed to have it while at work and patient needs to work. Luh was calling to get a verbal to do wet to dry dressing. Luh states would looks good no signs of infection. RN gave verbal order for wet to dry dressing. RN routing message to  for informational purposes.     Patient has no follow up appointments made    Luh can be reached at [phone]      Electronically signed by Jeri Acevedo RN on 5/29/24 at 10:46 AM EDT

## 2024-05-29 NOTE — TELEPHONE ENCOUNTER
RN attempted to contact patient to relay  message. RN reached patient VM and left detailed message of why was calling and office number for patient to call office back.      Electronically signed by Jeri Acevedo RN on 5/29/24 at 2:23 PM EDT

## 2024-08-09 ENCOUNTER — TELEPHONE (OUTPATIENT)
Dept: BREAST CENTER | Age: 39
End: 2024-08-09

## 2024-08-09 NOTE — TELEPHONE ENCOUNTER
RN attempted to contact patient to confirm appointment on 8/23/24 with . RN reached patient VM and left detailed message of why was calling and office number for patient to call office back if need to reschedule.       Electronically signed by Jeri Acevedo RN on 8/9/24 at 11:56 AM EDT

## 2024-08-23 ENCOUNTER — TELEPHONE (OUTPATIENT)
Dept: BREAST CENTER | Age: 39
End: 2024-08-23

## 2024-08-23 NOTE — TELEPHONE ENCOUNTER
JOSH Acevedo called patient regarding her missed appointment with Dr. Gilmore on 8/23/24. Patient did not answer so a message was left asking patient to call office back at [phone] to reschedule this appointment.    Electronically signed by Jeri Acevedo RN on 8/23/24 at 11:22 AM EDT

## 2024-08-26 ENCOUNTER — OFFICE VISIT (OUTPATIENT)
Dept: FAMILY MEDICINE CLINIC | Age: 39
End: 2024-08-26
Payer: COMMERCIAL

## 2024-08-26 VITALS
DIASTOLIC BLOOD PRESSURE: 68 MMHG | SYSTOLIC BLOOD PRESSURE: 118 MMHG | TEMPERATURE: 97.8 F | BODY MASS INDEX: 31.72 KG/M2 | OXYGEN SATURATION: 98 % | HEIGHT: 61 IN | WEIGHT: 168 LBS | HEART RATE: 69 BPM

## 2024-08-26 DIAGNOSIS — N92.6 MISSED PERIOD: ICD-10-CM

## 2024-08-26 DIAGNOSIS — R10.9 ABDOMINAL CRAMPING: ICD-10-CM

## 2024-08-26 DIAGNOSIS — R11.0 NAUSEA: ICD-10-CM

## 2024-08-26 DIAGNOSIS — Z32.01 POSITIVE URINE PREGNANCY TEST: Primary | ICD-10-CM

## 2024-08-26 LAB
BILIRUBIN, POC: NEGATIVE
BLOOD URINE, POC: NORMAL
CLARITY, POC: NORMAL
COLOR, POC: YELLOW
CONTROL: NORMAL
GLUCOSE URINE, POC: NEGATIVE
KETONES, POC: NEGATIVE
LEUKOCYTE EST, POC: NEGATIVE
NITRITE, POC: NEGATIVE
PH, POC: 7.5
PREGNANCY TEST URINE, POC: POSITIVE
PROTEIN, POC: NORMAL
SPECIFIC GRAVITY, POC: >1.03
UROBILINOGEN, POC: 0.2

## 2024-08-26 PROCEDURE — 81025 URINE PREGNANCY TEST: CPT

## 2024-08-26 PROCEDURE — G8417 CALC BMI ABV UP PARAM F/U: HCPCS

## 2024-08-26 PROCEDURE — 4004F PT TOBACCO SCREEN RCVD TLK: CPT

## 2024-08-26 PROCEDURE — 81002 URINALYSIS NONAUTO W/O SCOPE: CPT

## 2024-08-26 PROCEDURE — G8427 DOCREV CUR MEDS BY ELIG CLIN: HCPCS

## 2024-08-26 PROCEDURE — 99214 OFFICE O/P EST MOD 30 MIN: CPT

## 2024-08-26 RX ORDER — PNV NO.95/FERROUS FUM/FOLIC AC 28MG-0.8MG
1 TABLET ORAL DAILY
Qty: 30 TABLET | Refills: 0 | Status: SHIPPED | OUTPATIENT
Start: 2024-08-26

## 2024-08-26 RX ORDER — BUSPIRONE HYDROCHLORIDE 15 MG/1
15 TABLET ORAL 3 TIMES DAILY
COMMUNITY
Start: 2024-08-19

## 2024-08-26 RX ORDER — MIRTAZAPINE 15 MG/1
15 TABLET, FILM COATED ORAL NIGHTLY
COMMUNITY
Start: 2024-08-19

## 2024-08-26 NOTE — PROGRESS NOTES
Chief Complaint       Abdominal Pain    History of Present Illness   Source of history provided by:  patient.      Jesse Jack is a 38 y.o. old female presenting to the walk in clinic for evaluation of intermittent lower abdominal cramping, headache, nausea for the past week or so.  Nausea has increased over the past 2 days and she is concerned as it feels similar to morning sickness with previous pregnancies.  She has not currently having abdominal cramping patient has had missed.  She is not sure when her last period was, she reports it was about 1 or 2 months ago. Reports associated nausea, but denies any vomiting or diarrhea. Has tried taking nothing OTC without symptomatic relief. Denies any fever, chills, loss of taste/smell, CP, SOB, dysuria, hematuria, change in stool color/consistency, melena, coffee-ground emesis, hematemesis, HA, sore throat, rash, or lethargy. No LMP recorded.    ROS    Unless otherwise stated in this report or unable to obtain because of the patient's clinical or mental status as evidenced by the medical record, this patients's positive and negative responses for Review of Systems, constitutional, psych, eyes, ENT, cardiovascular, respiratory, gastrointestinal, neurological, genitourinary, musculoskeletal, integument systems and systems related to the presenting problem are either stated in the preceding or were not pertinent or were negative for the symptoms and/or complaints related to the medical problem.    Physical Exam         VS:  /68   Pulse 69   Temp 97.8 °F (36.6 °C)   Ht 1.549 m (5' 0.98\")   Wt 76.2 kg (168 lb)   SpO2 98%   BMI 31.76 kg/m²    Oxygen Saturation Interpretation: Normal.    General Appearance/Constitutional:  Alert, development consistent with age, NAD.  HEENT:  NCAT.   Lungs: CTAB without wheezing, rales, or rhonchi.  Heart:  RRR, no murmurs, rubs, or gallops.  Abdomen:  General Appearance: No obvious trauma or bruising. No rashes or

## 2024-08-27 LAB — HCG QUANTITATIVE: ABNORMAL MIU/ML (ref 0–7)

## 2024-10-09 PROBLEM — O36.8390 FETAL ARRHYTHMIA AFFECTING PREGNANCY, ANTEPARTUM: Status: RESOLVED | Noted: 2017-12-20 | Resolved: 2024-10-09

## 2024-10-09 PROBLEM — Z34.90 INTRAUTERINE PREGNANCY: Status: RESOLVED | Noted: 2017-12-08 | Resolved: 2024-10-09

## 2024-10-09 PROBLEM — Z34.90 PREGNANCY: Status: RESOLVED | Noted: 2017-11-18 | Resolved: 2024-10-09

## 2024-10-09 PROBLEM — Z03.75 SUSPECTED SHORTENING OF CERVIX NOT FOUND: Status: ACTIVE | Noted: 2024-10-09

## 2024-10-09 PROBLEM — Z3A.35 35 WEEKS GESTATION OF PREGNANCY: Status: RESOLVED | Noted: 2017-12-19 | Resolved: 2024-10-09

## 2024-10-09 PROBLEM — Z03.75 SUSPECTED SHORTENING OF CERVIX NOT FOUND: Status: RESOLVED | Noted: 2024-10-09 | Resolved: 2024-10-09

## 2024-10-09 PROBLEM — Z3A.33 33 WEEKS GESTATION OF PREGNANCY: Status: RESOLVED | Noted: 2017-12-07 | Resolved: 2024-10-09

## 2024-10-09 PROBLEM — O36.5934: Status: RESOLVED | Noted: 2017-12-21 | Resolved: 2024-10-09

## 2024-10-09 PROBLEM — Z3A.36 36 WEEKS GESTATION OF PREGNANCY: Status: RESOLVED | Noted: 2017-12-27 | Resolved: 2024-10-09

## 2024-11-27 ENCOUNTER — HOSPITAL ENCOUNTER (OUTPATIENT)
Age: 39
Setting detail: OBSERVATION
Discharge: HOME | End: 2024-11-28
Attending: OBSTETRICS & GYNECOLOGY | Admitting: OBSTETRICS & GYNECOLOGY
Payer: COMMERCIAL

## 2024-11-27 PROBLEM — Z3A.20 20 WEEKS GESTATION OF PREGNANCY: Status: ACTIVE | Noted: 2024-11-27

## 2024-11-27 PROCEDURE — 99221 1ST HOSP IP/OBS SF/LOW 40: CPT | Performed by: ADVANCED PRACTICE MIDWIFE

## 2024-11-28 ENCOUNTER — ANCILLARY PROCEDURE (OUTPATIENT)
Dept: OBGYN CLINIC | Age: 39
End: 2024-11-28
Payer: COMMERCIAL

## 2024-11-28 VITALS
OXYGEN SATURATION: 100 % | SYSTOLIC BLOOD PRESSURE: 107 MMHG | HEART RATE: 65 BPM | TEMPERATURE: 98.2 F | RESPIRATION RATE: 18 BRPM | DIASTOLIC BLOOD PRESSURE: 54 MMHG

## 2024-11-28 PROBLEM — Z64.1 GRAND MULTIPARA: Status: ACTIVE | Noted: 2024-11-28

## 2024-11-28 PROBLEM — Z86.59 HISTORY OF PSYCHOSIS: Status: ACTIVE | Noted: 2024-11-28
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
## 2024-11-28 PROBLEM — R82.5 POSITIVE URINE DRUG SCREEN: Status: ACTIVE | Noted: 2024-11-28

## 2024-11-28 PROBLEM — Z87.898 HISTORY OF SEIZURES: Status: ACTIVE | Noted: 2024-11-28

## 2024-11-28 PROBLEM — O09.529 ANTEPARTUM MULTIGRAVIDA OF ADVANCED MATERNAL AGE: Status: ACTIVE | Noted: 2024-11-28

## 2024-11-28 PROBLEM — F10.11 HISTORY OF ALCOHOL ABUSE: Status: ACTIVE | Noted: 2024-11-28

## 2024-11-28 LAB
% FETAL BLEED: 0 %
ALBUMIN SERPL-MCNC: 3.7 G/DL (ref 3.5–5.2)
ALP SERPL-CCNC: 61 U/L (ref 35–104)
ALT SERPL-CCNC: 12 U/L (ref 0–32)
AMPHET UR QL SCN: POSITIVE
ANION GAP SERPL CALCULATED.3IONS-SCNC: 12 MMOL/L (ref 7–16)
AST SERPL-CCNC: 14 U/L (ref 0–31)
BARBITURATES UR QL SCN: NEGATIVE
BENZODIAZ UR QL: NEGATIVE
BILIRUB SERPL-MCNC: <0.2 MG/DL (ref 0–1.2)
BUN SERPL-MCNC: 9 MG/DL (ref 6–20)
BUPRENORPHINE UR QL: NEGATIVE
CALCIUM SERPL-MCNC: 8.7 MG/DL (ref 8.6–10.2)
CANNABINOIDS UR QL SCN: POSITIVE
CHLORIDE SERPL-SCNC: 106 MMOL/L (ref 98–107)
CO2 SERPL-SCNC: 18 MMOL/L (ref 22–29)
COCAINE UR QL SCN: NEGATIVE
CREAT SERPL-MCNC: 0.5 MG/DL (ref 0.5–1)
ERYTHROCYTE [DISTWIDTH] IN BLOOD BY AUTOMATED COUNT: 13 % (ref 11.5–15)
FENTANYL UR QL: NEGATIVE
FETAL BLEED VOLUME: 0 ML
GFR, ESTIMATED: >90 ML/MIN/1.73M2
GLUCOSE SERPL-MCNC: 108 MG/DL (ref 74–99)
HCT VFR BLD AUTO: 30.2 % (ref 34–48)
HGB BLD-MCNC: 9.7 G/DL (ref 11.5–15.5)
MCH RBC QN AUTO: 30.2 PG (ref 26–35)
MCHC RBC AUTO-ENTMCNC: 32.1 G/DL (ref 32–34.5)
MCV RBC AUTO: 94.1 FL (ref 80–99.9)
METHADONE UR QL: NEGATIVE
OPIATES UR QL SCN: NEGATIVE
OXYCODONE UR QL SCN: NEGATIVE
PCP UR QL SCN: NEGATIVE
PLATELET # BLD AUTO: 255 K/UL (ref 130–450)
PMV BLD AUTO: 9.9 FL (ref 7–12)
POTASSIUM SERPL-SCNC: 3.6 MMOL/L (ref 3.5–5)
PROT SERPL-MCNC: 6.3 G/DL (ref 6.4–8.3)
RBC # BLD AUTO: 3.21 M/UL (ref 3.5–5.5)
RHOGAM DOSES REQUIRED: 0
SODIUM SERPL-SCNC: 136 MMOL/L (ref 132–146)
TEST INFORMATION: ABNORMAL
TROPONIN I SERPL HS-MCNC: <6 NG/L (ref 0–9)
WBC OTHER # BLD: 9.4 K/UL (ref 4.5–11.5)

## 2024-11-28 PROCEDURE — G0480 DRUG TEST DEF 1-7 CLASSES: HCPCS

## 2024-11-28 PROCEDURE — 80053 COMPREHEN METABOLIC PANEL: CPT

## 2024-11-28 PROCEDURE — G0378 HOSPITAL OBSERVATION PER HR: HCPCS

## 2024-11-28 PROCEDURE — 85460 HEMOGLOBIN FETAL: CPT

## 2024-11-28 PROCEDURE — 76805 OB US >/= 14 WKS SNGL FETUS: CPT | Performed by: OBSTETRICS & GYNECOLOGY

## 2024-11-28 PROCEDURE — 80307 DRUG TEST PRSMV CHEM ANLYZR: CPT

## 2024-11-28 PROCEDURE — 76817 TRANSVAGINAL US OBSTETRIC: CPT | Performed by: OBSTETRICS & GYNECOLOGY

## 2024-11-28 PROCEDURE — 84484 ASSAY OF TROPONIN QUANT: CPT

## 2024-11-28 PROCEDURE — 85027 COMPLETE CBC AUTOMATED: CPT

## 2024-11-28 RX ORDER — SODIUM CHLORIDE, SODIUM LACTATE, POTASSIUM CHLORIDE, CALCIUM CHLORIDE 600; 310; 30; 20 MG/100ML; MG/100ML; MG/100ML; MG/100ML
INJECTION, SOLUTION INTRAVENOUS CONTINUOUS
Status: DISCONTINUED | OUTPATIENT
Start: 2024-11-28 | End: 2024-11-28 | Stop reason: HOSPADM

## 2024-11-28 NOTE — PROGRESS NOTES
Dishcharge paperwork gone over with patient. All questions answered at this time. Pt ambulated off unit with S/O.

## 2024-11-28 NOTE — PROGRESS NOTES
Assumed care of pt, denies any vb or lof. Reports some lower back/ abdominal pain. Pt resting in bed at this time, call light within reach.

## 2024-11-28 NOTE — DISCHARGE INSTRUCTIONS
Home Undelivered Discharge Instructions    After Discharge Orders:    No future appointments.              Diet:  normal diet as tolerated    Rest: normal activity as tolerated    Other instructions: Do kick counts once a day on your baby. Choose the time of day your baby is most active. Get in a comfortable lying or sitting position and time how long it takes to feel 10 kicks, twists, turns, swishes, or rolls. Call your physician or midwife if there have not been 10 kicks in 2 hours    Call physician or midwife, return to Labor and Delivery, call 911, or go to the nearest Emergency Room if: increased leakage or fluid, contractions more than  6 per  1 hour, decreased fetal movement, persistent low back pain or cramping, bleeding from vaginal area, difficulty urinating, pain with urination, difficulty breathing, new calf pain, persistent headache, or vision change

## 2024-11-28 NOTE — PROGRESS NOTES
Pt  20 weeks presents to unit for abdominal pain and contractions. Pt states she fell on her butt yesterday and was feeling fine until this evening when the abdominal pain started. Pt states she feels the pain in her lower abdomen and in her vagina. Pt states she has a headache. Pt states she feels short of breath and like her heart is racing. Pt denies lof, vaginal bleeding, and visual disturbances. FHTs obtained via doppler. VSS. Call light within reach.

## 2024-11-28 NOTE — H&P
CHIEF COMPLAINT:  here tonight fell on butt earlier felt ok now feeling cramping Into lower abdomen and vagina, also feeling CP sob and see stars and heart feels racing. +FM    HISTORY OF PRESENT ILLNESS:      The patient is a 39 y.o. female at 20w0d.  OB History          9    Para   8    Term           8    AB        Living   5         SAB        IAB        Ectopic        Molar        Multiple        Live Births   7            Patient presents with a chief complaint as above and is being admitted for observation    Estimated Due Date: Estimated Date of Delivery: 25    PRENATAL CARE:    Complicated by: 5 previous c/s, 3 vaginal deliveries over 20 weeks with  demise,     PAST OB HISTORY  OB History          9    Para   8    Term           8    AB        Living   5         SAB        IAB        Ectopic        Molar        Multiple        Live Births   7                Past Medical History:        Diagnosis Date    ADHD     Anxiety     Bipolar disorder (HCC)     Depression     on multiple medications    Seizures (HCC) 2018     Past Surgical History:        Procedure Laterality Date    BREAST LUMPECTOMY Left 2024    LEFT BREAST CENTRAL LUMPECTOMY performed by Kylah Gilmore MD at [facility] [place]    BREAST SURGERY      Removal of cyst on left-2017    BREAST SURGERY Left 2024    Left breast complex cyst excision performed by Kylah Gilmore MD at [facility] [place]     SECTION      x5     Allergies:  Pcn [penicillins]  Social History:    Social History     Socioeconomic History    Marital status:      Spouse name: Not on file    Number of children: Not on file    Years of education: Not on file    Highest education level: Not on file   Occupational History    Not on file   Tobacco Use    Smoking status: Every Day     Current packs/day: 0.00     Average packs/day: 1 pack/day for 16.0 years (16.0 ttl pk-yrs)     Types: Cigarettes     Last attempt to quit:

## 2024-11-28 NOTE — CONSULTS
[facility]   Consult for Medical Management      Reason for Consult:  Medical management    History of Present Illness:  39 y.o. female with 20 weeks pregnancy admitted after she fell on her butt started having lower abdominal and vaginal cramping.  She was feeling okay but later in the evening started having chest pain shortness of breath and heart racing.  We were consulted for medical management.      Informant(s) for H&P:     REVIEW OF SYSTEMS:  Complete ROS performed with patient, pertinent positives and negatives are listed in the HPI.    PMH:  Past Medical History:   Diagnosis Date    ADHD     Anxiety     Bipolar disorder (HCC)     Depression     on multiple medications    Seizures (HCC) 2018       Surgical History:  Past Surgical History:   Procedure Laterality Date    BREAST LUMPECTOMY Left 2024    LEFT BREAST CENTRAL LUMPECTOMY performed by Kylah Gilmore MD at [facility] [place] [place]    BREAST SURGERY      Removal of cyst on left-2017    BREAST SURGERY Left 2024    Left breast complex cyst excision performed by Kylah Gilmore MD at [facility] [place] [place]     SECTION      x5       Medications Prior to Admission:    Prior to Admission medications    Medication Sig Start Date End Date Taking? Authorizing Provider   amphetamine-dextroamphetamine (ADDERALL) 20 MG tablet Take 1 tablet by mouth daily.   Yes Heath Shankar MD   busPIRone (BUSPAR) 15 MG tablet Take 15 mg by mouth 3 times daily  Patient not taking: Reported on 2024   Heath Shankar MD   mirtazapine (REMERON) 15 MG tablet Take 1 tablet by mouth nightly  Patient not taking: Reported on 2024   Heath Shankar MD   Prenatal Vit-Fe Fumarate-FA (PRENATAL VITAMINS) 28-0.8 MG TABS Take 1 tablet by mouth daily  Patient not taking: Reported on 2024   Evan Mcdermott PA   tiZANidine (ZANAFLEX) 2 MG tablet Take 1 tablet by mouth 4 times daily as needed 
32.1  32.0 - 34.5 g/dL Final    RDW 11/28/2024 13.0  11.5 - 15.0 % Final    Platelets 11/28/2024 255  130 - 450 k/uL Final    MPV 11/28/2024 9.9  7.0 - 12.0 fL Final    Sodium 11/28/2024 136  132 - 146 mmol/L Final    Potassium 11/28/2024 3.6  3.5 - 5.0 mmol/L Final    Chloride 11/28/2024 106  98 - 107 mmol/L Final    CO2 11/28/2024 18 (L)  22 - 29 mmol/L Final    Anion Gap 11/28/2024 12  7 - 16 mmol/L Final    Glucose 11/28/2024 108 (H)  74 - 99 mg/dL Final    BUN 11/28/2024 9  6 - 20 mg/dL Final    Creatinine 11/28/2024 0.5  0.50 - 1.00 mg/dL Final    Est, Glom Filt Rate 11/28/2024 >90  >60 mL/min/1.73m2 Final    Calcium 11/28/2024 8.7  8.6 - 10.2 mg/dL Final    Total Protein 11/28/2024 6.3 (L)  6.4 - 8.3 g/dL Final    Albumin 11/28/2024 3.7  3.5 - 5.2 g/dL Final    Total Bilirubin 11/28/2024 <0.2  0.0 - 1.2 mg/dL Final    Alkaline Phosphatase 11/28/2024 61  35 - 104 U/L Final    ALT 11/28/2024 12  0 - 32 U/L Final    AST 11/28/2024 14  0 - 31 U/L Final    Troponin, High Sensitivity 11/28/2024 <6  0 - 9 ng/L Final    Amphetamine Screen, Ur 11/28/2024 POSITIVE (A)  NEGATIVE Final    Barbiturate Screen, Ur 11/28/2024 NEGATIVE  NEGATIVE Final    Cocaine Metabolite, Urine 11/28/2024 NEGATIVE  NEGATIVE Final    Benzodiazepine Screen, Urine 11/28/2024 NEGATIVE  NEGATIVE Final    Buprenorphine Urine 11/28/2024 NEGATIVE  NEGATIVE Final    Methadone Screen, Urine 11/28/2024 NEGATIVE  NEGATIVE Final    Opiates, Urine 11/28/2024 NEGATIVE  NEGATIVE Final    Phencyclidine, Urine 11/28/2024 NEGATIVE  NEGATIVE Final    Cannabinoid Scrn, Ur 11/28/2024 POSITIVE (A)  NEGATIVE Final    Fentanyl, Ur 11/28/2024 NEGATIVE  NEGATIVE Final    Oxycodone Screen, Ur 11/28/2024 NEGATIVE  NEGATIVE Final    Test Information 11/28/2024 These drug screen results are for medical purposes only and should not be considered definitive or confirmed.   Final     IMPRESSION:    1.  IUP at 20 weeks 1 days Estimated Date of Delivery: 4/16/2025    2.  Fell 
No respiratory distress. No stridor, Lungs sounds clear with good aeration bilaterally.

## 2024-11-29 LAB
ABNORMAL SPECIMEN VALIDITY TEST: ABNORMAL
AMPHET UR-MCNC: >1000 NG/ML
COMPLIANCE DRUG ANALYSIS, URINE: NORMAL
EKG ATRIAL RATE: 69 BPM
EKG P AXIS: 44 DEGREES
EKG P-R INTERVAL: 140 MS
EKG Q-T INTERVAL: 414 MS
EKG QRS DURATION: 84 MS
EKG QTC CALCULATION (BAZETT): 443 MS
EKG R AXIS: 16 DEGREES
EKG T AXIS: 18 DEGREES
EKG VENTRICULAR RATE: 69 BPM
EPHEDRIN UR QL: <100 NG/ML
INTEGRITY CHECK, CREATININE, URINE: 256.4 MG/DL (ref 22–250)
INTEGRITY CHECK, OXIDANT, URINE: <40 MG/L
INTEGRITY CHECK, PH, URINE: 5.7 (ref 4.5–9)
INTEGRITY CHECK, SPECIFIC GRAVITY, URINE: 1.04 (ref 1–1.03)
MDA, QUANTITATIVE, URINE: <100 NG/ML
MDEA, QUANTITATIVE, URINE: <100 NG/ML
MDMA UR QL: <100 NG/ML
METHAMPHETAMINE QUANTITATIVE URINE: <100 NG/ML
PHENTERMINE, URINE, QUANTITATIVE: <100 NG/ML
THC NORMALIZED, QUANTITIATIVE, URINE: ABNORMAL NG/ML
THC-COOH, QUANTITATIVE, URINE: >1000 NG/ML

## 2024-12-11 ENCOUNTER — ROUTINE PRENATAL (OUTPATIENT)
Dept: OBGYN CLINIC | Age: 39
End: 2024-12-11
Payer: COMMERCIAL

## 2024-12-11 ENCOUNTER — ANCILLARY PROCEDURE (OUTPATIENT)
Dept: OBGYN CLINIC | Age: 39
End: 2024-12-11
Payer: COMMERCIAL

## 2024-12-11 VITALS
HEART RATE: 81 BPM | WEIGHT: 178.6 LBS | BODY MASS INDEX: 33.76 KG/M2 | SYSTOLIC BLOOD PRESSURE: 108 MMHG | DIASTOLIC BLOOD PRESSURE: 73 MMHG

## 2024-12-11 DIAGNOSIS — Z64.1 GRAND MULTIPARA: ICD-10-CM

## 2024-12-11 DIAGNOSIS — R82.5 POSITIVE URINE DRUG SCREEN: ICD-10-CM

## 2024-12-11 DIAGNOSIS — Z3A.22 22 WEEKS GESTATION OF PREGNANCY: ICD-10-CM

## 2024-12-11 DIAGNOSIS — O09.529 ANTEPARTUM MULTIGRAVIDA OF ADVANCED MATERNAL AGE: Primary | ICD-10-CM

## 2024-12-11 DIAGNOSIS — O09.212 PREVIOUS PRETERM DELIVERY IN SECOND TRIMESTER, ANTEPARTUM: ICD-10-CM

## 2024-12-11 DIAGNOSIS — O34.219 PREVIOUS CESAREAN DELIVERY, ANTEPARTUM CONDITION OR COMPLICATION: ICD-10-CM

## 2024-12-11 DIAGNOSIS — O99.332 TOBACCO USE AFFECTING PREGNANCY IN SECOND TRIMESTER, ANTEPARTUM: ICD-10-CM

## 2024-12-11 LAB
GLUCOSE URINE, POC: NEGATIVE MG/DL
PROTEIN UA: NEGATIVE

## 2024-12-11 PROCEDURE — 99213 OFFICE O/P EST LOW 20 MIN: CPT | Performed by: OBSTETRICS & GYNECOLOGY

## 2024-12-11 PROCEDURE — 81002 URINALYSIS NONAUTO W/O SCOPE: CPT | Performed by: OBSTETRICS & GYNECOLOGY

## 2024-12-11 PROCEDURE — 99999 PR OFFICE/OUTPT VISIT,PROCEDURE ONLY: CPT | Performed by: OBSTETRICS & GYNECOLOGY

## 2024-12-11 PROCEDURE — 76815 OB US LIMITED FETUS(S): CPT | Performed by: OBSTETRICS & GYNECOLOGY

## 2024-12-11 PROCEDURE — 76817 TRANSVAGINAL US OBSTETRIC: CPT | Performed by: OBSTETRICS & GYNECOLOGY

## 2024-12-11 NOTE — PROGRESS NOTES
Darryn Reaves MD  [address]  [address]  [place] [address]                RE:  KIARA JACK  : 1985   AGE: 39 y.o.     This report has been created using voice recognition software. It may contain errors which are inherent in voice recognition technology.     Dear Dr. Reaves:     Kiara Jack had a fetal ultrasound evaluation performed today for the following indications:     Patient Active Problem List   Diagnosis    Drug fetal damage suspected in pregnancy    Previous operation to cervix affecting pregnancy in third trimester    Previous  delivery, antepartum condition or complication    Previous  delivery in second trimester, antepartum    Tobacco use affecting pregnancy in second trimester, antepartum    History of medical non-compliance    Abdominal cramping affecting pregnancy, antepartum    History of polysubstance abuse    History of bipolar 1 disorder, manic, moderate     History of alcohol abuse.  History of alcohol use during pregnancy.    History of psychosis    Fall on 2024    History of seizures    Antepartum multigravida of advanced maternal age    Positive urine drug screen    Grand multipara      Kiara Jack is a 39 y.o. female, who is G9(0,8,0,5). She has an Estimated Date of Delivery: 2025 based on her established dates.  She is currently 22 weeks 0 days gestation based on that assessment.      The patient was previously admitted to the labor and delivery unit for evaluation and management secondary to a history of a fall on her buttocks.  She was subsequently discharged when she was asymptomatic.     The patient has had no vaginal bleeding or leaking of amniotic fluid.  Her fetal movement has been good.     She has a complex obstetric history.  She has had 8 prior  deliveries ranging from 22 to 36 weeks gestational age.  She has had 5 previous  section deliveries.  She is at significant increased risk for both

## 2024-12-23 ENCOUNTER — ANCILLARY PROCEDURE (OUTPATIENT)
Dept: OBGYN CLINIC | Age: 39
End: 2024-12-23
Payer: COMMERCIAL

## 2024-12-23 ENCOUNTER — ROUTINE PRENATAL (OUTPATIENT)
Dept: OBGYN CLINIC | Age: 39
End: 2024-12-23
Payer: COMMERCIAL

## 2024-12-23 VITALS
WEIGHT: 181 LBS | HEIGHT: 61 IN | TEMPERATURE: 97.3 F | HEART RATE: 85 BPM | DIASTOLIC BLOOD PRESSURE: 72 MMHG | BODY MASS INDEX: 34.17 KG/M2 | OXYGEN SATURATION: 98 % | SYSTOLIC BLOOD PRESSURE: 114 MMHG

## 2024-12-23 DIAGNOSIS — F10.11 HISTORY OF ALCOHOL ABUSE: ICD-10-CM

## 2024-12-23 DIAGNOSIS — O09.212 PREVIOUS PRETERM DELIVERY IN SECOND TRIMESTER, ANTEPARTUM: ICD-10-CM

## 2024-12-23 DIAGNOSIS — R82.5 POSITIVE URINE DRUG SCREEN: ICD-10-CM

## 2024-12-23 DIAGNOSIS — O99.332 TOBACCO USE AFFECTING PREGNANCY IN SECOND TRIMESTER, ANTEPARTUM: ICD-10-CM

## 2024-12-23 DIAGNOSIS — Z86.59 HISTORY OF PSYCHOSIS: ICD-10-CM

## 2024-12-23 DIAGNOSIS — O34.219 PREVIOUS CESAREAN DELIVERY, ANTEPARTUM CONDITION OR COMPLICATION: ICD-10-CM

## 2024-12-23 DIAGNOSIS — O09.529 ANTEPARTUM MULTIGRAVIDA OF ADVANCED MATERNAL AGE: Primary | ICD-10-CM

## 2024-12-23 DIAGNOSIS — Z64.1 GRAND MULTIPARA: ICD-10-CM

## 2024-12-23 DIAGNOSIS — Z87.898 HISTORY OF SEIZURES: ICD-10-CM

## 2024-12-23 LAB
GLUCOSE URINE, POC: NEGATIVE MG/DL
PROTEIN UA: NEGATIVE

## 2024-12-23 PROCEDURE — 99999 PR OFFICE/OUTPT VISIT,PROCEDURE ONLY: CPT | Performed by: OBSTETRICS & GYNECOLOGY

## 2024-12-23 PROCEDURE — 76811 OB US DETAILED SNGL FETUS: CPT | Performed by: OBSTETRICS & GYNECOLOGY

## 2024-12-23 PROCEDURE — 76817 TRANSVAGINAL US OBSTETRIC: CPT | Performed by: OBSTETRICS & GYNECOLOGY

## 2024-12-23 PROCEDURE — 99213 OFFICE O/P EST LOW 20 MIN: CPT | Performed by: OBSTETRICS & GYNECOLOGY

## 2024-12-23 PROCEDURE — 81002 URINALYSIS NONAUTO W/O SCOPE: CPT | Performed by: OBSTETRICS & GYNECOLOGY

## 2024-12-23 NOTE — PATIENT INSTRUCTIONS
Please arrive for your scheduled appointment at least 15 minutes early with your actual insurance card+ a photo ID. Also if you need any refills ordered or have questions, it may take up 48 hours to reply. Please allow ample time for your refills. Call me when you use last refill. Thank you for your cooperation. You might be having an NST at your next appt. Please eat a large snack or breakfast before coming to office. Thank youCall your primary obstetrician with bleeding, leaking of fluid, abdominal tenderness, headache, blurry vision, epigastric pain and increased urinary frequency.If you are experiencing an emergency and need immediate help, call 911 or go to go emergency room or labor and delivery. Do kick counts after dinner. Call your primary obstetrician if less than 10 kicks in 2 hours after dinner.     Call your primary obstetrician with bleeding, leaking of fluid, abdominal tenderness, headache, blurry vision, epigastric pain and increased urinary frequency.if you are sick, not feeling well or have an infectious process going on please reschedule your appointment by calling [phone]. Also if any family members are not feeling well, please do not bring them to your appointment. We appreciate your cooperation. We are doing this in order to protect our pregnant mothers+ their babies.if you are sick, not feeling well or have an infectious process going on please reschedule your appointment by calling [phone]. Also if any family members are not feeling well, please do not bring them to your appointment. We appreciate your cooperation. We are doing this in order to protect our pregnant mothers+ their babies.

## 2024-12-23 NOTE — PROGRESS NOTES
Pt presents for 2 wk follow up. Pt has abdomen tightening and contraction occasionally. Pt denies bleeding, cramping, and lof. Pt states good fetal movement.  
, as per the recommendation of the Society of Gynecologic Oncology, American College of Obstetricians and Gynecologists, as this patient is at above average risk for development of ovarian cancer.  I advised the patient that the primary benefits of such surgery is a 65% reduction in future risk of ovarian cancer.  The patient was advised that large-scale studies have not demonstrated an increase in estimated blood loss, complications, or operating time, with bilateral salpingectomy for ovarian cancer risk reduction, at the time of the  section, however; bleeding, infection, and injury to nearby organs are potential complications with this additional surgery.  The patient has been thoroughly counseled regarding the consequences of the loss of fertility following this procedure.  The patient understands that this loss of fertility cannot be reversed, and has expressed a verbal consent and signed a written consent that her wish is to proceed with the bilateral salpingectomy for the purposes of ovarian cancer reduction at the time of her  section delivery.        I reviewed with the patient her increased risk for having a fetus with a karyotype abnormality related to her age.  The results of her genetic testing were not available for review on 2024.       The patient is to return for follow-up assessment in 2 weeks, unless she has a clinical indication return prior to that time.    The patient is to continue to follow with Dr. Reaves in his office for ongoing prenatal care.      If you have any questions regarding her management, please contact me at your convenience and thank you for allowing me to participate in her care.     Sincerely,           Farhan Mccracken MD, MS, FACOG, FACS, RDCS, RDMS, RVT  Director Maternal-Fetal Medicine  [facility]  [phone]
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
## 2025-01-24 ENCOUNTER — HOSPITAL ENCOUNTER (OUTPATIENT)
Age: 40
Discharge: HOME | End: 2025-01-24
Attending: STUDENT IN AN ORGANIZED HEALTH CARE EDUCATION/TRAINING PROGRAM | Admitting: STUDENT IN AN ORGANIZED HEALTH CARE EDUCATION/TRAINING PROGRAM
Payer: COMMERCIAL

## 2025-01-24 VITALS
HEIGHT: 61 IN | TEMPERATURE: 98 F | SYSTOLIC BLOOD PRESSURE: 123 MMHG | RESPIRATION RATE: 16 BRPM | WEIGHT: 181 LBS | DIASTOLIC BLOOD PRESSURE: 74 MMHG | HEART RATE: 84 BPM | BODY MASS INDEX: 34.17 KG/M2

## 2025-01-24 PROBLEM — Z3A.28 28 WEEKS GESTATION OF PREGNANCY: Status: ACTIVE | Noted: 2025-01-24

## 2025-01-24 LAB — FIBRONECTIN FETAL VAG QL: NEGATIVE

## 2025-01-24 PROCEDURE — 99212 OFFICE O/P EST SF 10 MIN: CPT | Performed by: STUDENT IN AN ORGANIZED HEALTH CARE EDUCATION/TRAINING PROGRAM

## 2025-01-24 PROCEDURE — 82731 ASSAY OF FETAL FIBRONECTIN: CPT

## 2025-01-24 PROCEDURE — 59025 FETAL NON-STRESS TEST: CPT

## 2025-01-24 PROCEDURE — 99211 OFF/OP EST MAY X REQ PHY/QHP: CPT

## 2025-01-24 NOTE — PROGRESS NOTES
There was a maternal drop off that was noted on her FHRT when she was sitting up. Out of an adundance of caution, observed the FHRT for an additional two hours which was reactive with no decels noted. Ok for discharge. Precautiosn reviewed. FFN was neg

## 2025-01-24 NOTE — DISCHARGE INSTRUCTIONS
Home Undelivered Discharge Instructions    After Discharge Orders:    Future Appointments   Date Time Provider Department Center   2025  2:30 PM SCHEDULE, EDDA CHERELLEGEORGE RM 2 BDM [facility]       Call physician or midwife's office on *** for instructions.              Diet:  normal diet as tolerated    Rest: normal activity as tolerated    Other instructions: Do kick counts once a day on your baby. Choose the time of day your baby is most active. Get in a comfortable lying or sitting position and time how long it takes to feel 10 kicks, twists, turns, swishes, or rolls. Call your physician or midwife if there have not been 10 kicks in 2 hours    Call physician or midwife, return to Labor and Delivery, call 911, or go to the nearest Emergency Room if: increased leakage or fluid, contractions more than  6 per  1 hour, decreased fetal movement, persistent low back pain or cramping, bleeding from vaginal area, difficulty urinating, pain with urination, difficulty breathing, new calf pain, persistent headache, or vision change             Weeks 26 to 30 of Your Pregnancy: Care Instructions  You're starting your last trimester. You'll probably feel your baby moving around more. Your back may ache as your body gets used to your baby's size and length. Take care of yourself, and pay attention to what your body needs.    Talk to your doctor about getting the Tdap shot. It will help protect your  against whooping cough (pertussis). Also ask your doctor about flu and COVID-19 shots if you haven't had them yet. If your blood type is Rh negative, you may be given a shot of Rh immune globulin (such as RhoGAM). It can help prevent problems for your baby.   You may have [place] contractions. They are single or several strong contractions without a pattern. These are practice contractions but not the start of labor.   Be kind to yourself.       Take breaks when you're tired.  Change positions often. Don't sit for too

## 2025-01-24 NOTE — H&P
Department of Obstetrics and Gynecology  Labor and Delivery  Attending History and Pysical       Subjective:   Jesse Jack is a 39 y.o. female  at 28w2d here for r/o PTL. She reports some cramping last night but not consistent    -LOF, -VB, +FM,   Current pregnancy: c/b substance use. Hx of multiple  births      OB History    Para Term  AB Living   9 8   8   5   SAB IAB Ectopic Molar Multiple Live Births             7      # Outcome Date GA Lbr Erwin/2nd Weight Sex Type Anes PTL Lv   9 Current            8  17 36w1d   M CS-LTranv   VENICE   7  14   1.588 kg (3 lb 8 oz) F CS-LTranv Spinal  VENICE      Birth Comments: didn't go to NICU   6  11 30w0d  1.361 kg (3 lb) F CS-LTranv Spinal Y VENICE      Birth Comments: baby didn't go to NICU   5  04/25/10 28w0d  0.907 kg (2 lb) M CS-LTranv Spinal Y VENICE   4   23w0d   F Vag-Spont  Y ND   3   23w0d   F Vag-Spont  Y ND   2  04/15/02 24w0d  0.51 kg (1 lb 2 oz) M CS-LTranv Spinal Y VENICE      Birth Comments: Dilated at 16wks, in hosp. ob bedrest, in NICU for 4 months - leanring disability   1  01 22w0d   M Vag-Spont  Y FD      Birth Comments: [place]       Review of Systems  Skin: no changes  All other review of systems negative    Past Medical History  Past Medical History:   Diagnosis Date    ADHD     Anxiety     Bipolar disorder (HCC)     Depression     on multiple medications    Seizures (HCC) 2018       Past Surgical History  Past Surgical History:   Procedure Laterality Date    BREAST LUMPECTOMY Left 2024    [place] BREAST CENTRAL [place] [facility]    BREAST SURGERY      Removal of cyst on left-2017    BREAST SURGERY Left 2024    Left breast complex cyst excision [place] [facility] [place]     SECTION      x5       Allergies  Allergies   Allergen Reactions    Pcn [Penicillins]

## 2025-01-24 NOTE — PROGRESS NOTES
Patient is a , 28w2d presents for c/o abdominal cramping that started yesterday. Patient denies vaginal bleeding or leaking of fluid. Perceives positive fetal movement. Pt placed on EFM.

## 2025-02-06 ENCOUNTER — ANCILLARY PROCEDURE (OUTPATIENT)
Dept: OBGYN CLINIC | Age: 40
End: 2025-02-06
Payer: COMMERCIAL

## 2025-02-06 ENCOUNTER — ROUTINE PRENATAL (OUTPATIENT)
Dept: OBGYN CLINIC | Age: 40
End: 2025-02-06
Payer: COMMERCIAL

## 2025-02-06 VITALS
RESPIRATION RATE: 14 BRPM | TEMPERATURE: 97.3 F | HEART RATE: 93 BPM | DIASTOLIC BLOOD PRESSURE: 76 MMHG | SYSTOLIC BLOOD PRESSURE: 111 MMHG | WEIGHT: 187 LBS | OXYGEN SATURATION: 99 % | BODY MASS INDEX: 35.33 KG/M2

## 2025-02-06 DIAGNOSIS — Z86.59 HISTORY OF PSYCHOSIS: ICD-10-CM

## 2025-02-06 DIAGNOSIS — O09.529 ANTEPARTUM MULTIGRAVIDA OF ADVANCED MATERNAL AGE: Primary | ICD-10-CM

## 2025-02-06 DIAGNOSIS — Z87.898 HISTORY OF SEIZURES: ICD-10-CM

## 2025-02-06 DIAGNOSIS — Z64.1 GRAND MULTIPARA: ICD-10-CM

## 2025-02-06 DIAGNOSIS — O34.219 PREVIOUS CESAREAN DELIVERY, ANTEPARTUM CONDITION OR COMPLICATION: ICD-10-CM

## 2025-02-06 DIAGNOSIS — O99.340 MENTAL DISORDER AFFECTING PREGNANCY, ANTEPARTUM: ICD-10-CM

## 2025-02-06 DIAGNOSIS — O34.43 PREVIOUS OPERATION TO CERVIX AFFECTING PREGNANCY IN THIRD TRIMESTER: ICD-10-CM

## 2025-02-06 DIAGNOSIS — F10.11 HISTORY OF ALCOHOL ABUSE: ICD-10-CM

## 2025-02-06 DIAGNOSIS — O09.212 PREVIOUS PRETERM DELIVERY IN SECOND TRIMESTER, ANTEPARTUM: ICD-10-CM

## 2025-02-06 DIAGNOSIS — O26.899 ABDOMINAL CRAMPING AFFECTING PREGNANCY, ANTEPARTUM: ICD-10-CM

## 2025-02-06 DIAGNOSIS — R82.5 POSITIVE URINE DRUG SCREEN: ICD-10-CM

## 2025-02-06 DIAGNOSIS — R10.9 ABDOMINAL CRAMPING AFFECTING PREGNANCY, ANTEPARTUM: ICD-10-CM

## 2025-02-06 LAB
GLUCOSE URINE, POC: NORMAL MG/DL
PROTEIN UA: NEGATIVE

## 2025-02-06 PROCEDURE — 76818 FETAL BIOPHYS PROFILE W/NST: CPT | Performed by: OBSTETRICS & GYNECOLOGY

## 2025-02-06 PROCEDURE — 76820 UMBILICAL ARTERY ECHO: CPT | Performed by: OBSTETRICS & GYNECOLOGY

## 2025-02-06 PROCEDURE — 99213 OFFICE O/P EST LOW 20 MIN: CPT | Performed by: OBSTETRICS & GYNECOLOGY

## 2025-02-06 PROCEDURE — 76805 OB US >/= 14 WKS SNGL FETUS: CPT | Performed by: OBSTETRICS & GYNECOLOGY

## 2025-02-06 PROCEDURE — 76817 TRANSVAGINAL US OBSTETRIC: CPT | Performed by: OBSTETRICS & GYNECOLOGY

## 2025-02-06 PROCEDURE — 81002 URINALYSIS NONAUTO W/O SCOPE: CPT | Performed by: OBSTETRICS & GYNECOLOGY

## 2025-02-06 NOTE — PROGRESS NOTES
Patient is here today for f/u cervical check. Denies any vaginal bleeding, or leakage of fluids.  Patient states she has to lay down often. Feels a heaviness on her chest and sometimes feels like her heart is racing especially when physical activity.   Tightness in abdominal area.  Patient reports good fetal movement.   Praf form completed, 3rd trimester.

## 2025-02-07 NOTE — PROGRESS NOTES
Darryn Reaves MD  [address]  [address]  [place] [address]                RE:  KIARA JACK  : 1985   AGE: 39 y.o.     This report has been created using voice recognition software. It may contain errors which are inherent in voice recognition technology.     Dear Dr. Reaves:     Kiara Jack had a fetal ultrasound evaluation and fetal testing performed today for the following indications:     Patient Active Problem List   Diagnosis    Drug fetal damage suspected in pregnancy    Previous operation to cervix affecting pregnancy in third trimester    Previous  delivery, antepartum condition or complication    Previous  delivery in second trimester, antepartum    Tobacco use affecting pregnancy in second trimester, antepartum    History of medical non-compliance    History of polysubstance abuse    History of bipolar 1 disorder, manic, moderate     History of alcohol abuse.  History of alcohol use during pregnancy.    History of psychosis    Fall on 2024    History of seizures    Antepartum multigravida of advanced maternal age    Positive urine drug screen    Grand multipara      Kiara Jack is a 39 y.o. female, who is G9(0,8,0,5). She has an Estimated Date of Delivery: 2025 based on her established dates.  She is currently 30 weeks 1 days gestation based on that assessment.      The patient was previously admitted to the labor and delivery unit for evaluation and management secondary to a history of a fall on her buttocks.  She was subsequently discharged when she was asymptomatic.     The patient has had no vaginal bleeding or leaking of amniotic fluid.  Her fetal movement has been good.     She has a complex obstetric history.  She has had 8 prior  deliveries ranging from 22 to 36 weeks gestational age.  She has had 5 previous  section deliveries.  She is at significant increased risk for both  and maternal morbidity and

## 2025-02-18 ENCOUNTER — ROUTINE PRENATAL (OUTPATIENT)
Dept: OBGYN CLINIC | Age: 40
End: 2025-02-18
Payer: COMMERCIAL

## 2025-02-18 ENCOUNTER — ANCILLARY PROCEDURE (OUTPATIENT)
Dept: OBGYN CLINIC | Age: 40
End: 2025-02-18
Payer: COMMERCIAL

## 2025-02-18 VITALS
DIASTOLIC BLOOD PRESSURE: 75 MMHG | BODY MASS INDEX: 35.36 KG/M2 | WEIGHT: 187.3 LBS | TEMPERATURE: 97.3 F | HEART RATE: 80 BPM | OXYGEN SATURATION: 98 % | HEIGHT: 61 IN | SYSTOLIC BLOOD PRESSURE: 113 MMHG

## 2025-02-18 DIAGNOSIS — Z86.59 HISTORY OF PSYCHOSIS: ICD-10-CM

## 2025-02-18 DIAGNOSIS — Z87.898 HISTORY OF SEIZURES: ICD-10-CM

## 2025-02-18 DIAGNOSIS — R10.9 ABDOMINAL CRAMPING AFFECTING PREGNANCY, ANTEPARTUM: ICD-10-CM

## 2025-02-18 DIAGNOSIS — O26.899 ABDOMINAL CRAMPING AFFECTING PREGNANCY, ANTEPARTUM: ICD-10-CM

## 2025-02-18 DIAGNOSIS — O09.212 PREVIOUS PRETERM DELIVERY IN SECOND TRIMESTER, ANTEPARTUM: ICD-10-CM

## 2025-02-18 DIAGNOSIS — O36.8130 DECREASED FETAL MOVEMENTS IN THIRD TRIMESTER, SINGLE OR UNSPECIFIED FETUS: Primary | ICD-10-CM

## 2025-02-18 DIAGNOSIS — R82.5 POSITIVE URINE DRUG SCREEN: ICD-10-CM

## 2025-02-18 DIAGNOSIS — O34.219 PREVIOUS CESAREAN DELIVERY, ANTEPARTUM CONDITION OR COMPLICATION: ICD-10-CM

## 2025-02-18 DIAGNOSIS — Z03.75 SUSPECTED SHORTENING OF CERVIX NOT FOUND: ICD-10-CM

## 2025-02-18 DIAGNOSIS — Z64.1 GRAND MULTIPARA: ICD-10-CM

## 2025-02-18 DIAGNOSIS — O34.43 PREVIOUS OPERATION TO CERVIX AFFECTING PREGNANCY IN THIRD TRIMESTER: ICD-10-CM

## 2025-02-18 DIAGNOSIS — F10.11 HISTORY OF ALCOHOL ABUSE: ICD-10-CM

## 2025-02-18 DIAGNOSIS — O09.529 ANTEPARTUM MULTIGRAVIDA OF ADVANCED MATERNAL AGE: ICD-10-CM

## 2025-02-18 DIAGNOSIS — O99.340 MENTAL DISORDER AFFECTING PREGNANCY, ANTEPARTUM: ICD-10-CM

## 2025-02-18 LAB
GLUCOSE URINE, POC: NEGATIVE MG/DL
PROTEIN UA: NEGATIVE

## 2025-02-18 PROCEDURE — 76821 MIDDLE CEREBRAL ARTERY ECHO: CPT | Performed by: OBSTETRICS & GYNECOLOGY

## 2025-02-18 PROCEDURE — 99213 OFFICE O/P EST LOW 20 MIN: CPT | Performed by: OBSTETRICS & GYNECOLOGY

## 2025-02-18 PROCEDURE — 81002 URINALYSIS NONAUTO W/O SCOPE: CPT | Performed by: OBSTETRICS & GYNECOLOGY

## 2025-02-18 PROCEDURE — 76815 OB US LIMITED FETUS(S): CPT | Performed by: OBSTETRICS & GYNECOLOGY

## 2025-02-18 PROCEDURE — 76820 UMBILICAL ARTERY ECHO: CPT | Performed by: OBSTETRICS & GYNECOLOGY

## 2025-02-18 PROCEDURE — 76817 TRANSVAGINAL US OBSTETRIC: CPT | Performed by: OBSTETRICS & GYNECOLOGY

## 2025-02-18 PROCEDURE — 76818 FETAL BIOPHYS PROFILE W/NST: CPT | Performed by: OBSTETRICS & GYNECOLOGY

## 2025-02-18 PROCEDURE — 99999 PR OFFICE/OUTPT VISIT,PROCEDURE ONLY: CPT | Performed by: OBSTETRICS & GYNECOLOGY

## 2025-02-18 NOTE — PATIENT INSTRUCTIONS
Please arrive for your scheduled appointment at least 15 minutes early with your actual insurance card+ a photo ID. Also if you need any refills ordered or have questions, it may take up 48 hours to reply. Please allow ample time for your refills. Call me when you use last refill. Thank you for your cooperation. You might be having an NST at your next appt. Please eat a large snack or breakfast before coming to office. Thank youCall your primary obstetrician with bleeding, leaking of fluid, abdominal tenderness, headache, blurry vision, epigastric pain and increased urinary frequency.If you are experiencing an emergency and need immediate help, call 911 or go to go emergency room or labor and delivery. Do kick counts after dinner. Call your primary obstetrician if less than 10 kicks in 2 hours after dinner.     Call your primary obstetrician with bleeding, leaking of fluid, abdominal tenderness, headache, blurry vision, epigastric pain and increased urinary frequency.if you are sick, not feeling well or have an infectious process going on please reschedule your appointment by calling [phone]. Also if any family members are not feeling well, please do not bring them to your appointment. We appreciate your cooperation. We are doing this in order to protect our pregnant mothers+ their babies.if you are sick, not feeling well or have an infectious process going on please reschedule your appointment by calling [phone]. Also if any family members are not feeling well, please do not bring them to your appointment. We appreciate your cooperation. We are doing this in order to protect our pregnant mothers+ their babies.

## 2025-02-18 NOTE — PROGRESS NOTES
Jesse Jack here for follow up ultrasound today and nst.  She denies bleeding and lof.  Pt is having cramping. Pt is also having contractions and pelvic pressure.  Decreased fetal movement.

## 2025-02-18 NOTE — PROGRESS NOTES
Darrny Reaves MD  [address]  [address]  [place] [address]                RE:  KIARA JACK  : 1985   AGE: 39 y.o.     This report has been created using voice recognition software. It may contain errors which are inherent in voice recognition technology.     Dear Dr. Reaves:     Kiara Jack had a fetal ultrasound evaluation and fetal testing performed today for the following indications:     Patient Active Problem List   Diagnosis    Drug fetal damage suspected in pregnancy    Previous operation to cervix affecting pregnancy in third trimester    Previous  delivery, antepartum condition or complication    Previous  delivery in second trimester, antepartum    Tobacco use affecting pregnancy in second trimester, antepartum    History of medical non-compliance    History of polysubstance abuse    History of bipolar 1 disorder, manic, moderate     History of alcohol abuse.  History of alcohol use during pregnancy.    History of psychosis    Fall on 2024    History of seizures    Antepartum multigravida of advanced maternal age    Positive urine drug screen    Grand multipara    Decreased fetal movement 2025      Kiara Jack is a 39 y.o. female, who is G9(0,8,0,5). She has an Estimated Date of Delivery: 2025 based on her established dates.  She is currently 31 weeks 6 days gestation based on that assessment.      The patient was previously admitted to the labor and delivery unit for evaluation and management secondary to a history of a fall on her buttocks.  She was subsequently discharged when she was asymptomatic.     The patient has had no vaginal bleeding or leaking of amniotic fluid.  Her fetal movement has been good.     She has a complex obstetric history.  She has had 8 prior  deliveries ranging from 22 to 36 weeks gestational age.  She has had 5 previous  section deliveries.  She is at significant increased risk for both

## 2025-02-21 ENCOUNTER — ANCILLARY PROCEDURE (OUTPATIENT)
Dept: OBGYN CLINIC | Age: 40
End: 2025-02-21
Payer: COMMERCIAL

## 2025-02-21 ENCOUNTER — ROUTINE PRENATAL (OUTPATIENT)
Dept: OBGYN CLINIC | Age: 40
End: 2025-02-21
Payer: COMMERCIAL

## 2025-02-21 VITALS
OXYGEN SATURATION: 99 % | BODY MASS INDEX: 34.83 KG/M2 | SYSTOLIC BLOOD PRESSURE: 113 MMHG | TEMPERATURE: 97 F | DIASTOLIC BLOOD PRESSURE: 78 MMHG | HEIGHT: 61 IN | WEIGHT: 184.5 LBS | HEART RATE: 97 BPM

## 2025-02-21 DIAGNOSIS — Z87.898 HISTORY OF SEIZURES: ICD-10-CM

## 2025-02-21 DIAGNOSIS — F10.11 HISTORY OF ALCOHOL ABUSE: ICD-10-CM

## 2025-02-21 DIAGNOSIS — R10.9 ABDOMINAL CRAMPING AFFECTING PREGNANCY, ANTEPARTUM: Primary | ICD-10-CM

## 2025-02-21 DIAGNOSIS — Z64.1 GRAND MULTIPARA: ICD-10-CM

## 2025-02-21 DIAGNOSIS — R82.5 POSITIVE URINE DRUG SCREEN: ICD-10-CM

## 2025-02-21 DIAGNOSIS — O26.899 ABDOMINAL CRAMPING AFFECTING PREGNANCY, ANTEPARTUM: Primary | ICD-10-CM

## 2025-02-21 DIAGNOSIS — O34.219 PREVIOUS CESAREAN DELIVERY, ANTEPARTUM CONDITION OR COMPLICATION: ICD-10-CM

## 2025-02-21 DIAGNOSIS — O09.529 ANTEPARTUM MULTIGRAVIDA OF ADVANCED MATERNAL AGE: ICD-10-CM

## 2025-02-21 DIAGNOSIS — O34.43 PREVIOUS OPERATION TO CERVIX AFFECTING PREGNANCY IN THIRD TRIMESTER: ICD-10-CM

## 2025-02-21 LAB
GLUCOSE URINE, POC: NEGATIVE MG/DL
PROTEIN UA: NEGATIVE

## 2025-02-21 PROCEDURE — 76820 UMBILICAL ARTERY ECHO: CPT | Performed by: OBSTETRICS & GYNECOLOGY

## 2025-02-21 PROCEDURE — 81002 URINALYSIS NONAUTO W/O SCOPE: CPT | Performed by: OBSTETRICS & GYNECOLOGY

## 2025-02-21 PROCEDURE — 76821 MIDDLE CEREBRAL ARTERY ECHO: CPT | Performed by: OBSTETRICS & GYNECOLOGY

## 2025-02-21 PROCEDURE — 76815 OB US LIMITED FETUS(S): CPT | Performed by: OBSTETRICS & GYNECOLOGY

## 2025-02-21 PROCEDURE — 76818 FETAL BIOPHYS PROFILE W/NST: CPT | Performed by: OBSTETRICS & GYNECOLOGY

## 2025-02-21 PROCEDURE — 99213 OFFICE O/P EST LOW 20 MIN: CPT | Performed by: OBSTETRICS & GYNECOLOGY

## 2025-02-21 NOTE — PATIENT INSTRUCTIONS
Please arrive for your scheduled appointment at least 15 minutes early with your actual insurance card+ a photo ID. Also if you need any refills ordered or have questions, it may take up 48 hours to reply. Please allow ample time for your refills. Call me when you use last refill. Thank you for your cooperation. You might be having an NST at your next appt. Please eat a large snack or breakfast before coming to office. Thank youCall your primary obstetrician with bleeding, leaking of fluid, abdominal tenderness, headache, blurry vision, epigastric pain and increased urinary frequency.If you are experiencing an emergency and need immediate help, call 911 or go to go emergency room or labor and delivery. Do kick counts after dinner. Call your primary obstetrician if less than 10 kicks in 2 hours after dinner.     Call your primary obstetrician with bleeding, leaking of fluid, abdominal tenderness, headache, blurry vision, epigastric pain and increased urinary frequency.if you are sick, not feeling well or have an infectious process going on please reschedule your appointment by calling [phone]. Also if any family members are not feeling well, please do not bring them to your appointment. We appreciate your cooperation. We are doing this in order to protect our pregnant mothers+ their babies.if you are sick, not feeling well or have an infectious process going on please reschedule your appointment by calling [phone]. Also if any family members are not feeling well, please do not bring them to your appointment. We appreciate your cooperation. We are doing this in order to protect our pregnant mothers+ their babies.

## 2025-02-21 NOTE — PROGRESS NOTES
Jesse Jack here for follow up ultrasound today.   She denies bleeding, lof, or cramping.  Pt is having contractions. Pt states she has been coughing a lot.  Decreased  fetal movement.

## 2025-02-22 NOTE — PROGRESS NOTES
Darryn Reaves MD  [address]  [address]  [place] [address]                RE:  KIARA JACK  : 1985   AGE: 39 y.o.     This report has been created using voice recognition software. It may contain errors which are inherent in voice recognition technology.     Dear Dr. Reaves:     Kiara Jack had fetal testing performed today for the following indications:     Patient Active Problem List   Diagnosis    Drug fetal damage suspected in pregnancy    Previous operation to cervix affecting pregnancy in third trimester    Previous  delivery, antepartum condition or complication    Previous  delivery in second trimester, antepartum    Tobacco use affecting pregnancy in second trimester, antepartum    History of medical non-compliance    History of polysubstance abuse    History of bipolar 1 disorder, manic, moderate     History of alcohol abuse.  History of alcohol use during pregnancy.    History of psychosis    Fall on 2024    History of seizures    Antepartum multigravida of advanced maternal age    Positive urine drug screen    Grand multipara    Decreased fetal movement 2025      Kiara Jack is a 39 y.o. female, who is G9(0,8,0,5). She has an Estimated Date of Delivery: 2025 based on her established dates.  She is currently 32 weeks 2 days gestation based on that assessment.      The patient was previously admitted to the labor and delivery unit for evaluation and management secondary to a history of a fall on her buttocks.  She was subsequently discharged when she was asymptomatic.     The patient has had no vaginal bleeding or leaking of amniotic fluid.  Her fetal movement has been good.     She has a complex obstetric history.  She has had 8 prior  deliveries ranging from 22 to 36 weeks gestational age.  She has had 5 previous  section deliveries.  She is at significant increased risk for both  and maternal morbidity and

## 2025-02-25 ENCOUNTER — ROUTINE PRENATAL (OUTPATIENT)
Dept: OBGYN CLINIC | Age: 40
End: 2025-02-25
Payer: COMMERCIAL

## 2025-02-25 ENCOUNTER — ANCILLARY PROCEDURE (OUTPATIENT)
Dept: OBGYN CLINIC | Age: 40
End: 2025-02-25
Payer: COMMERCIAL

## 2025-02-25 VITALS
TEMPERATURE: 97.3 F | WEIGHT: 184.1 LBS | HEART RATE: 94 BPM | BODY MASS INDEX: 34.76 KG/M2 | OXYGEN SATURATION: 97 % | HEIGHT: 61 IN | DIASTOLIC BLOOD PRESSURE: 66 MMHG | SYSTOLIC BLOOD PRESSURE: 102 MMHG

## 2025-02-25 DIAGNOSIS — O99.340 MENTAL DISORDER AFFECTING PREGNANCY, ANTEPARTUM: ICD-10-CM

## 2025-02-25 DIAGNOSIS — Z64.1 GRAND MULTIPARA: Primary | ICD-10-CM

## 2025-02-25 DIAGNOSIS — F10.11 HISTORY OF ALCOHOL ABUSE: ICD-10-CM

## 2025-02-25 DIAGNOSIS — Z87.898 HISTORY OF SEIZURES: ICD-10-CM

## 2025-02-25 DIAGNOSIS — R55 SYNCOPE, UNSPECIFIED SYNCOPE TYPE: ICD-10-CM

## 2025-02-25 DIAGNOSIS — O34.43 PREVIOUS OPERATION TO CERVIX AFFECTING PREGNANCY IN THIRD TRIMESTER: ICD-10-CM

## 2025-02-25 DIAGNOSIS — O34.219 PREVIOUS CESAREAN DELIVERY, ANTEPARTUM CONDITION OR COMPLICATION: ICD-10-CM

## 2025-02-25 DIAGNOSIS — O26.899 ABDOMINAL CRAMPING AFFECTING PREGNANCY, ANTEPARTUM: ICD-10-CM

## 2025-02-25 DIAGNOSIS — R10.9 ABDOMINAL CRAMPING AFFECTING PREGNANCY, ANTEPARTUM: ICD-10-CM

## 2025-02-25 DIAGNOSIS — O09.529 ANTEPARTUM MULTIGRAVIDA OF ADVANCED MATERNAL AGE: ICD-10-CM

## 2025-02-25 DIAGNOSIS — R82.5 POSITIVE URINE DRUG SCREEN: ICD-10-CM

## 2025-02-25 LAB
GLUCOSE URINE, POC: NEGATIVE MG/DL
PROTEIN UA: NEGATIVE

## 2025-02-25 PROCEDURE — 76815 OB US LIMITED FETUS(S): CPT | Performed by: OBSTETRICS & GYNECOLOGY

## 2025-02-25 PROCEDURE — G8417 CALC BMI ABV UP PARAM F/U: HCPCS | Performed by: OBSTETRICS & GYNECOLOGY

## 2025-02-25 PROCEDURE — 81002 URINALYSIS NONAUTO W/O SCOPE: CPT | Performed by: OBSTETRICS & GYNECOLOGY

## 2025-02-25 PROCEDURE — 99214 OFFICE O/P EST MOD 30 MIN: CPT | Performed by: OBSTETRICS & GYNECOLOGY

## 2025-02-25 PROCEDURE — 99213 OFFICE O/P EST LOW 20 MIN: CPT | Performed by: OBSTETRICS & GYNECOLOGY

## 2025-02-25 PROCEDURE — 4004F PT TOBACCO SCREEN RCVD TLK: CPT | Performed by: OBSTETRICS & GYNECOLOGY

## 2025-02-25 PROCEDURE — 76821 MIDDLE CEREBRAL ARTERY ECHO: CPT | Performed by: OBSTETRICS & GYNECOLOGY

## 2025-02-25 PROCEDURE — 59025 FETAL NON-STRESS TEST: CPT | Performed by: OBSTETRICS & GYNECOLOGY

## 2025-02-25 PROCEDURE — 76818 FETAL BIOPHYS PROFILE W/NST: CPT | Performed by: OBSTETRICS & GYNECOLOGY

## 2025-02-25 PROCEDURE — G8427 DOCREV CUR MEDS BY ELIG CLIN: HCPCS | Performed by: OBSTETRICS & GYNECOLOGY

## 2025-02-25 PROCEDURE — 76820 UMBILICAL ARTERY ECHO: CPT | Performed by: OBSTETRICS & GYNECOLOGY

## 2025-02-25 NOTE — PROGRESS NOTES
Jesse Jack here for follow up ultrasound today.   She denies bleeding and lof. Pt is having cramping. Pt passes out at work yesterday. She felt weak on and off yesterday. Pt is a cook at work. Pt states that she feels like her heart is racing. Pt also feels like the babies heart beat is racing when hers is as well.  Pt states that her right side of her butt is sore from passing out. States she did not get hurt.  Positive fetal movement.

## 2025-02-25 NOTE — PATIENT INSTRUCTIONS
Please arrive for your scheduled appointment at least 15 minutes early with your actual insurance card+ a photo ID. Also if you need any refills ordered or have questions, it may take up 48 hours to reply. Please allow ample time for your refills. Call me when you use last refill. Thank you for your cooperation. You might be having an NST at your next appt. Please eat a large snack or breakfast before coming to office. Thank youCall your primary obstetrician with bleeding, leaking of fluid, abdominal tenderness, headache, blurry vision, epigastric pain and increased urinary frequency.If you are experiencing an emergency and need immediate help, call 911 or go to go emergency room or labor and delivery. Do kick counts after dinner. Call your primary obstetrician if less than 10 kicks in 2 hours after dinner.     Call your primary obstetrician with bleeding, leaking of fluid, abdominal tenderness, headache, blurry vision, epigastric pain and increased urinary frequency.if you are sick, not feeling well or have an infectious process going on please reschedule your appointment by calling [phone]. Also if any family members are not feeling well, please do not bring them to your appointment. We appreciate your cooperation. We are doing this in order to protect our pregnant mothers+ their babies.if you are sick, not feeling well or have an infectious process going on please reschedule your appointment by calling [phone]. Also if any family members are not feeling well, please do not bring them to your appointment. We appreciate your cooperation. We are doing this in order to protect our pregnant mothers+ their babies.

## 2025-02-25 NOTE — PROGRESS NOTES
Darryn Reaves MD  [address]  [address]  [place] [address]                RE:  KIARA JACK  : 1985   AGE: 39 y.o.     This report has been created using voice recognition software. It may contain errors which are inherent in voice recognition technology.     Dear Dr. Reaves:     Kiara Jack had an appointment today for the following indications:     Patient Active Problem List   Diagnosis    Drug fetal damage suspected in pregnancy    Previous operation to cervix affecting pregnancy in third trimester    Previous  delivery, antepartum condition or complication    Previous  delivery in second trimester, antepartum    Tobacco use affecting pregnancy in second trimester, antepartum    History of medical non-compliance    History of polysubstance abuse    History of bipolar 1 disorder, manic, moderate     History of alcohol abuse.  History of alcohol use during pregnancy.    History of psychosis    Fall on 2024    History of seizures    Antepartum multigravida of advanced maternal age    Positive urine drug screen    Grand multipara    Decreased fetal movement 2025      Kiara Jack is a 39 y.o. female, who is G9(0,8,0,5). She has an Estimated Date of Delivery: 2025 based on her established dates.  She is currently 32 weeks 6 days gestation based on that assessment.      The patient was previously admitted to the labor and delivery unit for evaluation and management secondary to a history of a fall on her buttocks.  She was subsequently discharged when she was asymptomatic.  She stated that she recently fell at work after having a syncopal episode, and fell on her side.  She had no vaginal bleeding or leaking of amniotic fluid.  Her fetal movement has remained decreased.  She stated that she declined to be transferred to the hospital by ambulance because of concerns about her car.  She rested at work and subsequently drove home.  She was not seen or

## 2025-02-28 ENCOUNTER — ROUTINE PRENATAL (OUTPATIENT)
Dept: OBGYN CLINIC | Age: 40
End: 2025-02-28
Payer: COMMERCIAL

## 2025-02-28 ENCOUNTER — ANCILLARY PROCEDURE (OUTPATIENT)
Dept: OBGYN CLINIC | Age: 40
End: 2025-02-28
Payer: COMMERCIAL

## 2025-02-28 VITALS
HEART RATE: 107 BPM | HEIGHT: 61 IN | SYSTOLIC BLOOD PRESSURE: 117 MMHG | BODY MASS INDEX: 34.95 KG/M2 | DIASTOLIC BLOOD PRESSURE: 85 MMHG | OXYGEN SATURATION: 96 % | TEMPERATURE: 97.5 F | WEIGHT: 185.1 LBS

## 2025-02-28 DIAGNOSIS — R82.5 POSITIVE URINE DRUG SCREEN: ICD-10-CM

## 2025-02-28 DIAGNOSIS — O34.43 PREVIOUS OPERATION TO CERVIX AFFECTING PREGNANCY IN THIRD TRIMESTER: ICD-10-CM

## 2025-02-28 DIAGNOSIS — Z64.1 GRAND MULTIPARA: ICD-10-CM

## 2025-02-28 DIAGNOSIS — O26.899 ABDOMINAL CRAMPING AFFECTING PREGNANCY, ANTEPARTUM: ICD-10-CM

## 2025-02-28 DIAGNOSIS — R10.9 ABDOMINAL CRAMPING AFFECTING PREGNANCY, ANTEPARTUM: ICD-10-CM

## 2025-02-28 DIAGNOSIS — O34.219 PREVIOUS CESAREAN DELIVERY, ANTEPARTUM CONDITION OR COMPLICATION: ICD-10-CM

## 2025-02-28 DIAGNOSIS — O09.529 ANTEPARTUM MULTIGRAVIDA OF ADVANCED MATERNAL AGE: Primary | ICD-10-CM

## 2025-02-28 DIAGNOSIS — O09.212 PREVIOUS PRETERM DELIVERY IN SECOND TRIMESTER, ANTEPARTUM: ICD-10-CM

## 2025-02-28 LAB
GLUCOSE URINE, POC: NEGATIVE MG/DL
PROTEIN UA: POSITIVE

## 2025-02-28 PROCEDURE — 99213 OFFICE O/P EST LOW 20 MIN: CPT | Performed by: OBSTETRICS & GYNECOLOGY

## 2025-02-28 PROCEDURE — 81002 URINALYSIS NONAUTO W/O SCOPE: CPT | Performed by: OBSTETRICS & GYNECOLOGY

## 2025-02-28 PROCEDURE — 76818 FETAL BIOPHYS PROFILE W/NST: CPT | Performed by: OBSTETRICS & GYNECOLOGY

## 2025-02-28 PROCEDURE — 76821 MIDDLE CEREBRAL ARTERY ECHO: CPT | Performed by: OBSTETRICS & GYNECOLOGY

## 2025-02-28 PROCEDURE — 76820 UMBILICAL ARTERY ECHO: CPT | Performed by: OBSTETRICS & GYNECOLOGY

## 2025-02-28 PROCEDURE — 76816 OB US FOLLOW-UP PER FETUS: CPT | Performed by: OBSTETRICS & GYNECOLOGY

## 2025-02-28 NOTE — PATIENT INSTRUCTIONS
Please arrive for your scheduled appointment at least 15 minutes early with your actual insurance card+ a photo ID. Also if you need any refills ordered or have questions, it may take up 48 hours to reply. Please allow ample time for your refills. Call me when you use last refill. Thank you for your cooperation. You might be having an NST at your next appt. Please eat a large snack or breakfast before coming to office. Thank youCall your primary obstetrician with bleeding, leaking of fluid, abdominal tenderness, headache, blurry vision, epigastric pain and increased urinary frequency.If you are experiencing an emergency and need immediate help, call 911 or go to go emergency room or labor and delivery. Do kick counts after dinner. Call your primary obstetrician if less than 10 kicks in 2 hours after dinner.     Call your primary obstetrician with bleeding, leaking of fluid, abdominal tenderness, headache, blurry vision, epigastric pain and increased urinary frequency.if you are sick, not feeling well or have an infectious process going on please reschedule your appointment by calling [phone]. Also if any family members are not feeling well, please do not bring them to your appointment. We appreciate your cooperation. We are doing this in order to protect our pregnant mothers+ their babies.if you are sick, not feeling well or have an infectious process going on please reschedule your appointment by calling [phone]. Also if any family members are not feeling well, please do not bring them to your appointment. We appreciate your cooperation. We are doing this in order to protect our pregnant mothers+ their babies.

## 2025-03-01 NOTE — PROGRESS NOTES
Jesse Jack here for follow up ultrasound today.   She denies bleeding and lof. Pt is having  cramping.   Positive fetal movement.     
kg/m²   Urine dipstick:  Negative Glucose  Negative Protein     IMPRESSION:    1.  IUP at 33 weeks 2 days Estimated Date of Delivery: 2025    2.  Previously fell on her side while at work. Declined ambulance transfer to the hospital for assessment.    3.  Complaint of shortness of breath and tachycardia which have occurred intermittently.     4.  Advanced maternal age. The patient will be 40 years of age at her estimated date of delivery.    5.  O positive blood type    6.  Genetic testing results were not available on 2024    7.  Five previous  section deliveries    8.  Positive drug screen for amphetamines and cannabinoids on 2024    9.  Eight previous  deliveries.  Grand multiparity.  10.  History of a seizure disorder  11.  Abdominal cramping, resolved  12.  Hemodynamically stable  13.  Normal cervical length with no apparent funneling of the amniotic membranes 2025  14.  Poor fetal growth.  EFW 1827 g,(28th%).  AC 8th% on 2025  15.  History of alcohol use during pregnancy  16.  Declined cervical cerclage.  History of 4 prior mid trimester pregnancy deliveries.  17.  Reassuring biophysical profile, cord and MCA Doppler studies on 2025  18.  Subjective complaint of decreased fetal movement  19.  History of syncopal episode at work    PLAN:  I would recommend that the patient count fetal movements and call if she notices any subjective decrease in fetal movements, particularly if there are less than 10 major movements in 2 hours. Non-stress testing should be performed every 3 to 4 days through the balance of the pregnancy. Serial ultrasounds to assess fetal anatomy and growth should be performed. The patient is at increase risk for  morbidity and mortality secondary to her history.  Twice weekly BPP and cord Doppler testing should be performed, unless there is a clinical indication to perform the testing more frequently.    I previously discussed the risks

## 2025-03-04 ENCOUNTER — ANCILLARY PROCEDURE (OUTPATIENT)
Dept: OBGYN CLINIC | Age: 40
End: 2025-03-04
Payer: COMMERCIAL

## 2025-03-04 ENCOUNTER — ROUTINE PRENATAL (OUTPATIENT)
Dept: OBGYN CLINIC | Age: 40
End: 2025-03-04
Payer: COMMERCIAL

## 2025-03-04 VITALS
HEART RATE: 89 BPM | BODY MASS INDEX: 34.9 KG/M2 | WEIGHT: 184.7 LBS | SYSTOLIC BLOOD PRESSURE: 115 MMHG | DIASTOLIC BLOOD PRESSURE: 77 MMHG | TEMPERATURE: 97.3 F | OXYGEN SATURATION: 98 %

## 2025-03-04 DIAGNOSIS — O36.8130 DECREASED FETAL MOVEMENTS IN THIRD TRIMESTER, SINGLE OR UNSPECIFIED FETUS: ICD-10-CM

## 2025-03-04 DIAGNOSIS — Z64.1 GRAND MULTIPARA: ICD-10-CM

## 2025-03-04 DIAGNOSIS — O09.529 ANTEPARTUM MULTIGRAVIDA OF ADVANCED MATERNAL AGE: Primary | ICD-10-CM

## 2025-03-04 DIAGNOSIS — O34.43 PREVIOUS OPERATION TO CERVIX AFFECTING PREGNANCY IN THIRD TRIMESTER: ICD-10-CM

## 2025-03-04 DIAGNOSIS — O34.219 PREVIOUS CESAREAN DELIVERY, ANTEPARTUM CONDITION OR COMPLICATION: ICD-10-CM

## 2025-03-04 DIAGNOSIS — O26.899 ABDOMINAL CRAMPING AFFECTING PREGNANCY, ANTEPARTUM: ICD-10-CM

## 2025-03-04 DIAGNOSIS — R10.9 ABDOMINAL CRAMPING AFFECTING PREGNANCY, ANTEPARTUM: ICD-10-CM

## 2025-03-04 DIAGNOSIS — O99.340 MENTAL DISORDER AFFECTING PREGNANCY, ANTEPARTUM: ICD-10-CM

## 2025-03-04 DIAGNOSIS — R82.5 POSITIVE URINE DRUG SCREEN: ICD-10-CM

## 2025-03-04 LAB
GLUCOSE URINE, POC: NEGATIVE MG/DL
PROTEIN UA: NEGATIVE

## 2025-03-04 PROCEDURE — 76820 UMBILICAL ARTERY ECHO: CPT | Performed by: OBSTETRICS & GYNECOLOGY

## 2025-03-04 PROCEDURE — 76815 OB US LIMITED FETUS(S): CPT | Performed by: OBSTETRICS & GYNECOLOGY

## 2025-03-04 PROCEDURE — 76818 FETAL BIOPHYS PROFILE W/NST: CPT | Performed by: OBSTETRICS & GYNECOLOGY

## 2025-03-04 PROCEDURE — 81002 URINALYSIS NONAUTO W/O SCOPE: CPT | Performed by: OBSTETRICS & GYNECOLOGY

## 2025-03-04 PROCEDURE — 99999 PR OFFICE/OUTPT VISIT,PROCEDURE ONLY: CPT | Performed by: OBSTETRICS & GYNECOLOGY

## 2025-03-04 PROCEDURE — 99213 OFFICE O/P EST LOW 20 MIN: CPT | Performed by: OBSTETRICS & GYNECOLOGY

## 2025-03-04 PROCEDURE — 76821 MIDDLE CEREBRAL ARTERY ECHO: CPT | Performed by: OBSTETRICS & GYNECOLOGY

## 2025-03-04 NOTE — PROGRESS NOTES
Jesse Jakc here for follow up ultrasound today and nst.  She denies bleeding, lof, or cramping.   Positive fetal movement.

## 2025-03-04 NOTE — PATIENT INSTRUCTIONS
Please arrive for your scheduled appointment at least 15 minutes early with your actual insurance card+ a photo ID. Also if you need any refills ordered or have questions, it may take up 48 hours to reply. Please allow ample time for your refills. Call me when you use last refill. Thank you for your cooperation. You might be having an NST at your next appt. Please eat a large snack or breakfast before coming to office. Thank youCall your primary obstetrician with bleeding, leaking of fluid, abdominal tenderness, headache, blurry vision, epigastric pain and increased urinary frequency.If you are experiencing an emergency and need immediate help, call 911 or go to go emergency room or labor and delivery. Do kick counts after dinner. Call your primary obstetrician if less than 10 kicks in 2 hours after dinner.     Call your primary obstetrician with bleeding, leaking of fluid, abdominal tenderness, headache, blurry vision, epigastric pain and increased urinary frequency.if you are sick, not feeling well or have an infectious process going on please reschedule your appointment by calling [phone]. Also if any family members are not feeling well, please do not bring them to your appointment. We appreciate your cooperation. We are doing this in order to protect our pregnant mothers+ their babies.if you are sick, not feeling well or have an infectious process going on please reschedule your appointment by calling [phone]. Also if any family members are not feeling well, please do not bring them to your appointment. We appreciate your cooperation. We are doing this in order to protect our pregnant mothers+ their babies.

## 2025-03-06 NOTE — PROGRESS NOTES
Dr. Dipika Fajardo  [address] [id number]                 RE:  KIARA JACK  : 1985   AGE: 39 y.o.     This report has been created using voice recognition software. It may contain errors which are inherent in voice recognition technology.     Dear Dr. Fajardo:     Kiara Jack had fetal testing performed today for the following indications:     Patient Active Problem List   Diagnosis    Drug fetal damage suspected in pregnancy    Previous operation to cervix affecting pregnancy in third trimester    Previous  delivery, antepartum condition or complication    Previous  delivery in second trimester, antepartum    Tobacco use affecting pregnancy in second trimester, antepartum    History of medical non-compliance    History of polysubstance abuse    History of bipolar 1 disorder, manic, moderate     History of alcohol abuse.  History of alcohol use during pregnancy.    History of psychosis    Fall on 2024    History of seizures    Antepartum multigravida of advanced maternal age    Positive urine drug screen    Grand multipara    Decreased fetal movement       Kiara Jack is a 39 y.o. female, who is G9(0,8,0,5). She has an Estimated Date of Delivery: 2025 based on her established dates.  She is currently 33 weeks 6 days gestation based on that assessment.      The patient was previously admitted to the labor and delivery unit for evaluation and management secondary to a history of a fall on her buttocks.  She was subsequently discharged when she was asymptomatic.  She stated that she recently fell at work after having a syncopal episode, and fell on her side.  She had no vaginal bleeding or leaking of amniotic fluid.  Her fetal movement has remained decreased.  She stated that she declined to be transferred to the hospital by ambulance because of concerns about her car.  She rested at work and subsequently drove home.  She was not seen or

## 2025-03-11 ENCOUNTER — ROUTINE PRENATAL (OUTPATIENT)
Dept: OBGYN CLINIC | Age: 40
End: 2025-03-11
Payer: COMMERCIAL

## 2025-03-11 ENCOUNTER — ANCILLARY PROCEDURE (OUTPATIENT)
Dept: OBGYN CLINIC | Age: 40
End: 2025-03-11
Payer: COMMERCIAL

## 2025-03-11 VITALS
SYSTOLIC BLOOD PRESSURE: 125 MMHG | OXYGEN SATURATION: 98 % | WEIGHT: 185.5 LBS | TEMPERATURE: 97.3 F | BODY MASS INDEX: 35.02 KG/M2 | DIASTOLIC BLOOD PRESSURE: 83 MMHG | HEIGHT: 61 IN | HEART RATE: 95 BPM

## 2025-03-11 DIAGNOSIS — F10.11 HISTORY OF ALCOHOL ABUSE: ICD-10-CM

## 2025-03-11 DIAGNOSIS — O99.340 MENTAL DISORDER AFFECTING PREGNANCY, ANTEPARTUM: ICD-10-CM

## 2025-03-11 DIAGNOSIS — O34.43 PREVIOUS OPERATION TO CERVIX AFFECTING PREGNANCY IN THIRD TRIMESTER: ICD-10-CM

## 2025-03-11 DIAGNOSIS — R82.5 POSITIVE URINE DRUG SCREEN: ICD-10-CM

## 2025-03-11 DIAGNOSIS — O26.899 ABDOMINAL CRAMPING AFFECTING PREGNANCY, ANTEPARTUM: ICD-10-CM

## 2025-03-11 DIAGNOSIS — O34.219 PREVIOUS CESAREAN DELIVERY, ANTEPARTUM CONDITION OR COMPLICATION: ICD-10-CM

## 2025-03-11 DIAGNOSIS — R10.9 ABDOMINAL CRAMPING AFFECTING PREGNANCY, ANTEPARTUM: ICD-10-CM

## 2025-03-11 DIAGNOSIS — O09.529 ANTEPARTUM MULTIGRAVIDA OF ADVANCED MATERNAL AGE: Primary | ICD-10-CM

## 2025-03-11 DIAGNOSIS — Z64.1 GRAND MULTIPARA: ICD-10-CM

## 2025-03-11 DIAGNOSIS — Z87.898 HISTORY OF SEIZURES: ICD-10-CM

## 2025-03-11 LAB
GLUCOSE URINE, POC: NEGATIVE MG/DL
PROTEIN UA: POSITIVE

## 2025-03-11 PROCEDURE — 76815 OB US LIMITED FETUS(S): CPT | Performed by: OBSTETRICS & GYNECOLOGY

## 2025-03-11 PROCEDURE — 76818 FETAL BIOPHYS PROFILE W/NST: CPT | Performed by: OBSTETRICS & GYNECOLOGY

## 2025-03-11 PROCEDURE — 99999 PR OFFICE/OUTPT VISIT,PROCEDURE ONLY: CPT | Performed by: OBSTETRICS & GYNECOLOGY

## 2025-03-11 PROCEDURE — 99213 OFFICE O/P EST LOW 20 MIN: CPT | Performed by: OBSTETRICS & GYNECOLOGY

## 2025-03-11 PROCEDURE — 81002 URINALYSIS NONAUTO W/O SCOPE: CPT | Performed by: OBSTETRICS & GYNECOLOGY

## 2025-03-11 PROCEDURE — 76821 MIDDLE CEREBRAL ARTERY ECHO: CPT | Performed by: OBSTETRICS & GYNECOLOGY

## 2025-03-11 PROCEDURE — 76820 UMBILICAL ARTERY ECHO: CPT | Performed by: OBSTETRICS & GYNECOLOGY

## 2025-03-11 NOTE — PROGRESS NOTES
Jesse Jack here for follow up ultrasound today and nst.  She denies bleeding, lof, or cramping.   Positive fetal movement.

## 2025-03-11 NOTE — PATIENT INSTRUCTIONS
Please arrive for your scheduled appointment at least 15 minutes early with your actual insurance card+ a photo ID. Also if you need any refills ordered or have questions, it may take up 48 hours to reply. Please allow ample time for your refills. Call me when you use last refill. Thank you for your cooperation. You might be having an NST at your next appt. Please eat a large snack or breakfast before coming to office. Thank youCall your primary obstetrician with bleeding, leaking of fluid, abdominal tenderness, headache, blurry vision, epigastric pain and increased urinary frequency.If you are experiencing an emergency and need immediate help, call 911 or go to go emergency room or labor and delivery. Do kick counts after dinner. Call your primary obstetrician if less than 10 kicks in 2 hours after dinner.     Call your primary obstetrician with bleeding, leaking of fluid, abdominal tenderness, headache, blurry vision, epigastric pain and increased urinary frequency.if you are sick, not feeling well or have an infectious process going on please reschedule your appointment by calling [phone]. Also if any family members are not feeling well, please do not bring them to your appointment. We appreciate your cooperation. We are doing this in order to protect our pregnant mothers+ their babies.if you are sick, not feeling well or have an infectious process going on please reschedule your appointment by calling [phone]. Also if any family members are not feeling well, please do not bring them to your appointment. We appreciate your cooperation. We are doing this in order to protect our pregnant mothers+ their babies.

## 2025-03-13 NOTE — PROGRESS NOTES
Dr. Dipika Fajardo  [address] [id number]                 RE:  KIARA JACK  : 1985   AGE: 39 y.o.     This report has been created using voice recognition software. It may contain errors which are inherent in voice recognition technology.     Dear Dr. Fajardo:     Kiara Jack had fetal testing performed today for the following indications:     Patient Active Problem List   Diagnosis    Drug fetal damage suspected in pregnancy    Previous operation to cervix affecting pregnancy in third trimester    Previous  delivery, antepartum condition or complication    Previous  delivery in second trimester, antepartum    Tobacco use affecting pregnancy in second trimester, antepartum    History of medical non-compliance    History of polysubstance abuse    History of bipolar 1 disorder, manic, moderate     History of alcohol abuse.  History of alcohol use during pregnancy.    History of psychosis    Fall on 2024    History of seizures    Antepartum multigravida of advanced maternal age    Positive urine drug screen    Grand multipara    Decreased fetal movement       Kiara Jack is a 39 y.o. female, who is G9(0,8,0,5). She has an Estimated Date of Delivery: 2025 based on her established dates.  She is currently 34 weeks 6 days gestation based on that assessment.      The patient was previously admitted to the labor and delivery unit for evaluation and management secondary to a history of a fall on her buttocks.  She was subsequently discharged when she was asymptomatic.  She stated that she recently fell at work after having a syncopal episode, and fell on her side.  She had no vaginal bleeding or leaking of amniotic fluid.  Her fetal movement has remained decreased.  She stated that she declined to be transferred to the hospital by ambulance because of concerns about her car.  She rested at work and subsequently drove home.  She was not seen or

## 2025-03-14 ENCOUNTER — INITIAL PRENATAL (OUTPATIENT)
Dept: OBGYN | Age: 40
End: 2025-03-14
Payer: COMMERCIAL

## 2025-03-14 ENCOUNTER — HOSPITAL ENCOUNTER (OUTPATIENT)
Age: 40
Setting detail: OBSERVATION
Discharge: HOME | End: 2025-03-14
Attending: STUDENT IN AN ORGANIZED HEALTH CARE EDUCATION/TRAINING PROGRAM | Admitting: OBSTETRICS & GYNECOLOGY
Payer: COMMERCIAL

## 2025-03-14 ENCOUNTER — ROUTINE PRENATAL (OUTPATIENT)
Dept: OBGYN CLINIC | Age: 40
End: 2025-03-14
Payer: COMMERCIAL

## 2025-03-14 ENCOUNTER — ANCILLARY PROCEDURE (OUTPATIENT)
Dept: OBGYN CLINIC | Age: 40
End: 2025-03-14
Payer: COMMERCIAL

## 2025-03-14 VITALS
SYSTOLIC BLOOD PRESSURE: 135 MMHG | BODY MASS INDEX: 34.96 KG/M2 | HEART RATE: 90 BPM | DIASTOLIC BLOOD PRESSURE: 85 MMHG | WEIGHT: 185 LBS

## 2025-03-14 VITALS
DIASTOLIC BLOOD PRESSURE: 79 MMHG | HEART RATE: 92 BPM | RESPIRATION RATE: 14 BRPM | TEMPERATURE: 97.3 F | SYSTOLIC BLOOD PRESSURE: 118 MMHG | BODY MASS INDEX: 34.96 KG/M2 | WEIGHT: 185 LBS

## 2025-03-14 VITALS
TEMPERATURE: 97.8 F | SYSTOLIC BLOOD PRESSURE: 112 MMHG | OXYGEN SATURATION: 100 % | DIASTOLIC BLOOD PRESSURE: 69 MMHG | HEART RATE: 83 BPM | RESPIRATION RATE: 16 BRPM

## 2025-03-14 DIAGNOSIS — O36.5930 POOR FETAL GROWTH AFFECTING MANAGEMENT OF MOTHER IN THIRD TRIMESTER, SINGLE OR UNSPECIFIED FETUS: ICD-10-CM

## 2025-03-14 DIAGNOSIS — Z3A.35 35 WEEKS GESTATION OF PREGNANCY: ICD-10-CM

## 2025-03-14 DIAGNOSIS — Z64.1 GRAND MULTIPARA: ICD-10-CM

## 2025-03-14 DIAGNOSIS — Z87.898 HISTORY OF SEIZURES: ICD-10-CM

## 2025-03-14 DIAGNOSIS — O09.529 ANTEPARTUM MULTIGRAVIDA OF ADVANCED MATERNAL AGE: Primary | ICD-10-CM

## 2025-03-14 DIAGNOSIS — F10.11 HISTORY OF ALCOHOL ABUSE: ICD-10-CM

## 2025-03-14 DIAGNOSIS — O09.529 ANTEPARTUM MULTIGRAVIDA OF ADVANCED MATERNAL AGE: ICD-10-CM

## 2025-03-14 DIAGNOSIS — O16.3 ELEVATED BLOOD PRESSURE AFFECTING PREGNANCY IN THIRD TRIMESTER, ANTEPARTUM: Primary | ICD-10-CM

## 2025-03-14 DIAGNOSIS — O34.43 PREVIOUS OPERATION TO CERVIX AFFECTING PREGNANCY IN THIRD TRIMESTER: ICD-10-CM

## 2025-03-14 DIAGNOSIS — O34.219 PREVIOUS CESAREAN DELIVERY, ANTEPARTUM CONDITION OR COMPLICATION: ICD-10-CM

## 2025-03-14 LAB
ALBUMIN SERPL-MCNC: 3.8 G/DL (ref 3.5–5.2)
ALP SERPL-CCNC: 108 U/L (ref 35–104)
ALT SERPL-CCNC: 9 U/L (ref 0–32)
ANION GAP SERPL CALCULATED.3IONS-SCNC: 12 MMOL/L (ref 7–16)
AST SERPL-CCNC: 12 U/L (ref 0–31)
BILIRUB SERPL-MCNC: <0.2 MG/DL (ref 0–1.2)
BILIRUB UR QL STRIP: NEGATIVE
BUN SERPL-MCNC: 8 MG/DL (ref 6–20)
C TRACH DNA GENITAL QL NAA+PROBE: NORMAL
CALCIUM SERPL-MCNC: 9.2 MG/DL (ref 8.6–10.2)
CHLORIDE SERPL-SCNC: 102 MMOL/L (ref 98–107)
CLARITY UR: CLEAR
CO2 SERPL-SCNC: 18 MMOL/L (ref 22–29)
COLOR UR: YELLOW
CREAT SERPL-MCNC: 0.5 MG/DL (ref 0.5–1)
CREAT UR-MCNC: 119.3 MG/DL (ref 29–226)
EPI CELLS #/AREA URNS HPF: ABNORMAL /HPF
ERYTHROCYTE [DISTWIDTH] IN BLOOD BY AUTOMATED COUNT: 13.2 % (ref 11.5–15)
GFR, ESTIMATED: >90 ML/MIN/1.73M2
GLUCOSE SERPL-MCNC: 102 MG/DL (ref 74–99)
GLUCOSE UR STRIP-MCNC: 100 MG/DL
HCT VFR BLD AUTO: 31.3 % (ref 34–48)
HGB BLD-MCNC: 10.2 G/DL (ref 11.5–15.5)
HGB UR QL STRIP.AUTO: NEGATIVE
KETONES UR STRIP-MCNC: ABNORMAL MG/DL
LEUKOCYTE ESTERASE UR QL STRIP: NEGATIVE
MCH RBC QN AUTO: 29.2 PG (ref 26–35)
MCHC RBC AUTO-ENTMCNC: 32.6 G/DL (ref 32–34.5)
MCV RBC AUTO: 89.7 FL (ref 80–99.9)
N. GONORRHOEAE DNA: NORMAL
NITRITE UR QL STRIP: NEGATIVE
PH UR STRIP: 6 [PH] (ref 5–8)
PLATELET # BLD AUTO: 298 K/UL (ref 130–450)
PMV BLD AUTO: 9.7 FL (ref 7–12)
POTASSIUM SERPL-SCNC: 3.8 MMOL/L (ref 3.5–5)
PROT SERPL-MCNC: 7.5 G/DL (ref 6.4–8.3)
PROT UR STRIP-MCNC: NEGATIVE MG/DL
RBC # BLD AUTO: 3.49 M/UL (ref 3.5–5.5)
RBC #/AREA URNS HPF: ABNORMAL /HPF
SODIUM SERPL-SCNC: 132 MMOL/L (ref 132–146)
SP GR UR STRIP: 1.01 (ref 1–1.03)
TOTAL PROTEIN, URINE: 12 MG/DL (ref 0–12)
URATE SERPL-MCNC: 3.5 MG/DL (ref 2.4–5.7)
URINE TOTAL PROTEIN CREATININE RATIO: 0.1 (ref 0–0.2)
UROBILINOGEN UR STRIP-ACNC: 0.2 EU/DL (ref 0–1)
WBC #/AREA URNS HPF: ABNORMAL /HPF
WBC OTHER # BLD: 7.5 K/UL (ref 4.5–11.5)

## 2025-03-14 PROCEDURE — 99213 OFFICE O/P EST LOW 20 MIN: CPT | Performed by: OBSTETRICS & GYNECOLOGY

## 2025-03-14 PROCEDURE — 82570 ASSAY OF URINE CREATININE: CPT

## 2025-03-14 PROCEDURE — 99213 OFFICE O/P EST LOW 20 MIN: CPT | Performed by: STUDENT IN AN ORGANIZED HEALTH CARE EDUCATION/TRAINING PROGRAM

## 2025-03-14 PROCEDURE — G0378 HOSPITAL OBSERVATION PER HR: HCPCS

## 2025-03-14 PROCEDURE — 76816 OB US FOLLOW-UP PER FETUS: CPT | Performed by: OBSTETRICS & GYNECOLOGY

## 2025-03-14 PROCEDURE — 81001 URINALYSIS AUTO W/SCOPE: CPT

## 2025-03-14 PROCEDURE — 80053 COMPREHEN METABOLIC PANEL: CPT

## 2025-03-14 PROCEDURE — 99221 1ST HOSP IP/OBS SF/LOW 40: CPT | Performed by: PHYSICIAN ASSISTANT

## 2025-03-14 PROCEDURE — 85027 COMPLETE CBC AUTOMATED: CPT

## 2025-03-14 PROCEDURE — 76818 FETAL BIOPHYS PROFILE W/NST: CPT | Performed by: OBSTETRICS & GYNECOLOGY

## 2025-03-14 PROCEDURE — 84550 ASSAY OF BLOOD/URIC ACID: CPT

## 2025-03-14 PROCEDURE — 76821 MIDDLE CEREBRAL ARTERY ECHO: CPT | Performed by: OBSTETRICS & GYNECOLOGY

## 2025-03-14 PROCEDURE — 84156 ASSAY OF PROTEIN URINE: CPT

## 2025-03-14 PROCEDURE — 76820 UMBILICAL ARTERY ECHO: CPT | Performed by: OBSTETRICS & GYNECOLOGY

## 2025-03-14 RX ORDER — DEXTROAMPHETAMINE SACCHARATE, AMPHETAMINE ASPARTATE MONOHYDRATE, DEXTROAMPHETAMINE SULFATE AND AMPHETAMINE SULFATE 1.25; 1.25; 1.25; 1.25 MG/1; MG/1; MG/1; MG/1
20 CAPSULE, EXTENDED RELEASE ORAL 2 TIMES DAILY
COMMUNITY

## 2025-03-14 SDOH — ECONOMIC STABILITY: FOOD INSECURITY: WITHIN THE PAST 12 MONTHS, THE FOOD YOU BOUGHT JUST DIDN'T LAST AND YOU DIDN'T HAVE MONEY TO GET MORE.: NEVER TRUE

## 2025-03-14 SDOH — ECONOMIC STABILITY: FOOD INSECURITY: WITHIN THE PAST 12 MONTHS, YOU WORRIED THAT YOUR FOOD WOULD RUN OUT BEFORE YOU GOT MONEY TO BUY MORE.: NEVER TRUE

## 2025-03-14 ASSESSMENT — PATIENT HEALTH QUESTIONNAIRE - PHQ9
8. MOVING OR SPEAKING SO SLOWLY THAT OTHER PEOPLE COULD HAVE NOTICED. OR THE OPPOSITE, BEING SO FIGETY OR RESTLESS THAT YOU HAVE BEEN MOVING AROUND A LOT MORE THAN USUAL: NOT AT ALL
SUM OF ALL RESPONSES TO PHQ QUESTIONS 1-9: 0
3. TROUBLE FALLING OR STAYING ASLEEP: NOT AT ALL
SUM OF ALL RESPONSES TO PHQ QUESTIONS 1-9: 0
SUM OF ALL RESPONSES TO PHQ QUESTIONS 1-9: 0
7. TROUBLE CONCENTRATING ON THINGS, SUCH AS READING THE NEWSPAPER OR WATCHING TELEVISION: NOT AT ALL
1. LITTLE INTEREST OR PLEASURE IN DOING THINGS: NOT AT ALL
4. FEELING TIRED OR HAVING LITTLE ENERGY: NOT AT ALL
2. FEELING DOWN, DEPRESSED OR HOPELESS: NOT AT ALL
10. IF YOU CHECKED OFF ANY PROBLEMS, HOW DIFFICULT HAVE THESE PROBLEMS MADE IT FOR YOU TO DO YOUR WORK, TAKE CARE OF THINGS AT HOME, OR GET ALONG WITH OTHER PEOPLE: NOT DIFFICULT AT ALL
5. POOR APPETITE OR OVEREATING: NOT AT ALL
6. FEELING BAD ABOUT YOURSELF - OR THAT YOU ARE A FAILURE OR HAVE LET YOURSELF OR YOUR FAMILY DOWN: NOT AT ALL
SUM OF ALL RESPONSES TO PHQ QUESTIONS 1-9: 0
9. THOUGHTS THAT YOU WOULD BE BETTER OFF DEAD, OR OF HURTING YOURSELF: NOT AT ALL

## 2025-03-14 NOTE — H&P
Department of Obstetrics and Gynecology  Labor and Delivery   Physician Assistant Obstetrics History and Physical      Patient Name: Jesse Jack  YOB: 1985   MRN: [id number]    CHIEF COMPLAINT:  elevated blood pressure    HISTORY OF PRESENT ILLNESS:    The patient is a 39 y.o. female, , Estimated Date of Delivery: 25, EGA: 35 and 2/7 weeks presents to L&D Antepartum from M and ob office for c/o elevated /86, repeated 118/79, and intermittent blurred vision. Pt denies any blurred vision currently but states it does happen on and off throughout the day, alongside occasional headaches that started within the last week. Has not taken Tylenol, described as mild. Pt also complains of possible heart palpitations 3-4 x daily which she reports was discussed with M & Dr. Fajardo today. None currently.  Patient denies leakage of fluid, vaginal bleeding, contractions, cramping, swelling, RUQ or epigastric pain, or urinary symptoms.  Perceived fetal movement is good.    Her current obstetrical history is significant for:   AMA  Previous  section x 5  History of seizure disorder, last seizure reported 4 years ago, last saw neurologist 1 year ago, not on any meds for the seizure disorder.   Previous cervical incompetence, 1st pregnancy had cerclage    PAST OB HISTORY  OB History    Para Term  AB Living   9 8  8  5   SAB IAB Ectopic Molar Multiple Live Births        7      # Outcome Date GA Lbr Erwin/2nd Weight Sex Type Anes PTL Lv   9 Current            8  17 36w1d   M CS-LTranv   VENICE   7  14   1.588 kg (3 lb 8 oz) F CS-LTranv Spinal  VENICE      Birth Comments: didn't go to NICU   6  11 30w0d  1.361 kg (3 lb) F CS-LTranv Spinal Y VENICE      Birth Comments: baby didn't go to NICU   5  04/25/10 28w0d  0.907 kg (2 lb) M CS-LTranv Spinal Y VENICE   4   23w0d   F Vag-Spont  Y ND   3   23w0d   F Vag-Spont  Y ND   2  age  Lungs: clear to auscultation  Heart: regular rate and rhythm and no murmur noted  Abdomen: soft, non-tender,   Gravid at 35 and 2/7 weeks   Uterine contractions: x 1 in 50 minutes   Uterus soft, non-tender.   Fetal heart rate:  Baseline heart rate 120, variability: moderate, accelerations: present, decelerations: absent  Pelvic Exam:     Cervix: deferred  Deep Tendon Reflexes:  Knees: not elevated  Extremities: (-) edema, (-) clonus  Back: (-) CVA tenderness.   Membranes: Intact      ASSESSMENT AND PLAN:  39 y.o. female,  at 35 and 2/7 weeks of gestation.  Elevated BP    R/o PIH        Discussed with Dr. Esquivel. Orders per him:  EFM  Observation  Cycle BP's  PIH labs  U/a with micro  P/c ratio  EKG if c/o palpitations during observation      Electronically signed by Sharron Zhou PA-C on 3/14/2025 at 10:50 AM

## 2025-03-14 NOTE — PROGRESS NOTES
Hailyjl RADHA Earle Jack presents to office today as a new ob patient.  Patient is 35w2d  Patient denies bleeding, LOF, or contractions.  Positive fetal movement.     
New patient here today for prenatal visit. Former patient of Dr. Reaves. Patient has a lot of concerns with this pregnancy she'd like to discuss.   Fetal heart tones obtained without difficulty.  No urine specimen obtained today.   Discharge instructions have been discussed with the patient. Patient advised to call our office with any questions or concerns.   Voiced understanding.     
PRAF completed  3rd trimester  
Depo-Provera, then she will come back to discuss other options for long-term birth control.  I told her to see her PCP and the neurologist asap, to get clearance regarding her palpitation and seizure disorder, she promised she would do it.     At least half hour spent for patient    This report has been created using voice recognition software. It may contain errors which are inherent in voice recognition technology.      Return in about 1 week (around 3/21/2025) for prenatal check.      Electronically signed by Dipika Fajardo MD on 3/14/25   
0

## 2025-03-14 NOTE — PROGRESS NOTES
Patient is here today for weekly visit. Denies any vaginal bleeding, or leakage of fluids.  Cramping on and off.   Patient reports good fetal movement.

## 2025-03-14 NOTE — PROGRESS NOTES
Pt given written and verbal discharge instructions. PIH precautions given.  Pt verbalizes understanding. Exit ambulatory.

## 2025-03-14 NOTE — PROGRESS NOTES
35w2d prev c/s x5, presents to antepatum from [facility] with complaints high blood pressure in the office and blurred vision, pt denies any blurred vision currently but states it does happen on and off throughout the day, alongside occasional headaches that started within the last week. Pt also complains of heart  palpitation 3-4 x daily, pulse ox applied, HR 80's right now. Pt has history of seizures, last one about 4 years ago, not on medication for that.  Placed on EFM, blood pressures cycling. House officer notified.

## 2025-03-14 NOTE — PROGRESS NOTES
Dr Jaime at nurses station, asked to review tracing, reviewed.  Updated on labs and blood pressures, states if uric acid result is normal, ok for discharge.

## 2025-03-16 PROBLEM — O36.5930 POOR FETAL GROWTH AFFECTING MANAGEMENT OF MOTHER IN THIRD TRIMESTER: Status: ACTIVE | Noted: 2025-03-16

## 2025-03-17 ENCOUNTER — TELEPHONE (OUTPATIENT)
Dept: OBGYN | Age: 40
End: 2025-03-17

## 2025-03-17 NOTE — TELEPHONE ENCOUNTER
Velma from [facility] left a message at [facility] asking for a call back for a working phone number

## 2025-03-18 ENCOUNTER — ANCILLARY PROCEDURE (OUTPATIENT)
Dept: OBGYN CLINIC | Age: 40
End: 2025-03-18
Payer: COMMERCIAL

## 2025-03-18 ENCOUNTER — ROUTINE PRENATAL (OUTPATIENT)
Dept: OBGYN CLINIC | Age: 40
End: 2025-03-18
Payer: COMMERCIAL

## 2025-03-18 VITALS
HEART RATE: 94 BPM | SYSTOLIC BLOOD PRESSURE: 109 MMHG | WEIGHT: 183.6 LBS | BODY MASS INDEX: 34.69 KG/M2 | DIASTOLIC BLOOD PRESSURE: 77 MMHG | TEMPERATURE: 97.3 F

## 2025-03-18 DIAGNOSIS — Z64.1 GRAND MULTIPARA: ICD-10-CM

## 2025-03-18 DIAGNOSIS — Z3A.35 35 WEEKS GESTATION OF PREGNANCY: ICD-10-CM

## 2025-03-18 DIAGNOSIS — O16.3 ELEVATED BLOOD PRESSURE AFFECTING PREGNANCY IN THIRD TRIMESTER, ANTEPARTUM: Primary | ICD-10-CM

## 2025-03-18 DIAGNOSIS — O09.529 ANTEPARTUM MULTIGRAVIDA OF ADVANCED MATERNAL AGE: ICD-10-CM

## 2025-03-18 DIAGNOSIS — O36.5930 POOR FETAL GROWTH AFFECTING MANAGEMENT OF MOTHER IN THIRD TRIMESTER, SINGLE OR UNSPECIFIED FETUS: ICD-10-CM

## 2025-03-18 DIAGNOSIS — R82.5 POSITIVE URINE DRUG SCREEN: ICD-10-CM

## 2025-03-18 DIAGNOSIS — O34.43 PREVIOUS OPERATION TO CERVIX AFFECTING PREGNANCY IN THIRD TRIMESTER: ICD-10-CM

## 2025-03-18 DIAGNOSIS — O34.219 PREVIOUS CESAREAN DELIVERY AFFECTING PREGNANCY, ANTEPARTUM: ICD-10-CM

## 2025-03-18 DIAGNOSIS — Z87.898 HISTORY OF SEIZURES: ICD-10-CM

## 2025-03-18 LAB
C TRACH DNA GENITAL QL NAA+PROBE: NEGATIVE
CULTURE: NORMAL
GLUCOSE URINE, POC: NEGATIVE MG/DL
N. GONORRHOEAE DNA: NEGATIVE
PROTEIN UA: NEGATIVE
SPECIMEN DESCRIPTION: NORMAL

## 2025-03-18 PROCEDURE — 99213 OFFICE O/P EST LOW 20 MIN: CPT | Performed by: OBSTETRICS & GYNECOLOGY

## 2025-03-18 PROCEDURE — 76815 OB US LIMITED FETUS(S): CPT | Performed by: OBSTETRICS & GYNECOLOGY

## 2025-03-18 PROCEDURE — 76818 FETAL BIOPHYS PROFILE W/NST: CPT | Performed by: OBSTETRICS & GYNECOLOGY

## 2025-03-18 PROCEDURE — 81002 URINALYSIS NONAUTO W/O SCOPE: CPT | Performed by: OBSTETRICS & GYNECOLOGY

## 2025-03-18 PROCEDURE — 76821 MIDDLE CEREBRAL ARTERY ECHO: CPT | Performed by: OBSTETRICS & GYNECOLOGY

## 2025-03-18 PROCEDURE — 76820 UMBILICAL ARTERY ECHO: CPT | Performed by: OBSTETRICS & GYNECOLOGY

## 2025-03-21 ENCOUNTER — ROUTINE PRENATAL (OUTPATIENT)
Dept: OBGYN | Age: 40
End: 2025-03-21
Payer: COMMERCIAL

## 2025-03-21 ENCOUNTER — ANCILLARY PROCEDURE (OUTPATIENT)
Dept: OBGYN CLINIC | Age: 40
End: 2025-03-21
Payer: COMMERCIAL

## 2025-03-21 ENCOUNTER — ROUTINE PRENATAL (OUTPATIENT)
Dept: OBGYN CLINIC | Age: 40
End: 2025-03-21
Payer: COMMERCIAL

## 2025-03-21 VITALS
BODY MASS INDEX: 34.77 KG/M2 | SYSTOLIC BLOOD PRESSURE: 120 MMHG | TEMPERATURE: 98.3 F | WEIGHT: 184 LBS | HEART RATE: 84 BPM | DIASTOLIC BLOOD PRESSURE: 82 MMHG

## 2025-03-21 VITALS
BODY MASS INDEX: 34.77 KG/M2 | HEART RATE: 95 BPM | WEIGHT: 184 LBS | SYSTOLIC BLOOD PRESSURE: 128 MMHG | DIASTOLIC BLOOD PRESSURE: 84 MMHG

## 2025-03-21 DIAGNOSIS — Z64.1 GRAND MULTIPARA: ICD-10-CM

## 2025-03-21 DIAGNOSIS — R82.5 POSITIVE URINE DRUG SCREEN: ICD-10-CM

## 2025-03-21 DIAGNOSIS — O09.529 ANTEPARTUM MULTIGRAVIDA OF ADVANCED MATERNAL AGE: Primary | ICD-10-CM

## 2025-03-21 DIAGNOSIS — O34.219 PREVIOUS CESAREAN DELIVERY AFFECTING PREGNANCY, ANTEPARTUM: ICD-10-CM

## 2025-03-21 DIAGNOSIS — Z87.898 HISTORY OF SEIZURES: ICD-10-CM

## 2025-03-21 DIAGNOSIS — Z3A.36 36 WEEKS GESTATION OF PREGNANCY: Primary | ICD-10-CM

## 2025-03-21 DIAGNOSIS — O34.43 PREVIOUS OPERATION TO CERVIX AFFECTING PREGNANCY IN THIRD TRIMESTER: ICD-10-CM

## 2025-03-21 DIAGNOSIS — O36.5930 POOR FETAL GROWTH AFFECTING MANAGEMENT OF MOTHER IN THIRD TRIMESTER, SINGLE OR UNSPECIFIED FETUS: ICD-10-CM

## 2025-03-21 DIAGNOSIS — O16.3 ELEVATED BLOOD PRESSURE AFFECTING PREGNANCY IN THIRD TRIMESTER, ANTEPARTUM: ICD-10-CM

## 2025-03-21 LAB
GLUCOSE URINE, POC: NEGATIVE MG/DL
PROTEIN UA: POSITIVE

## 2025-03-21 PROCEDURE — 99213 OFFICE O/P EST LOW 20 MIN: CPT | Performed by: OBSTETRICS & GYNECOLOGY

## 2025-03-21 PROCEDURE — 76821 MIDDLE CEREBRAL ARTERY ECHO: CPT | Performed by: OBSTETRICS & GYNECOLOGY

## 2025-03-21 PROCEDURE — 81002 URINALYSIS NONAUTO W/O SCOPE: CPT | Performed by: STUDENT IN AN ORGANIZED HEALTH CARE EDUCATION/TRAINING PROGRAM

## 2025-03-21 PROCEDURE — 76820 UMBILICAL ARTERY ECHO: CPT | Performed by: OBSTETRICS & GYNECOLOGY

## 2025-03-21 PROCEDURE — 99213 OFFICE O/P EST LOW 20 MIN: CPT | Performed by: STUDENT IN AN ORGANIZED HEALTH CARE EDUCATION/TRAINING PROGRAM

## 2025-03-21 PROCEDURE — 76818 FETAL BIOPHYS PROFILE W/NST: CPT | Performed by: OBSTETRICS & GYNECOLOGY

## 2025-03-21 PROCEDURE — 76815 OB US LIMITED FETUS(S): CPT | Performed by: OBSTETRICS & GYNECOLOGY

## 2025-03-21 NOTE — PROGRESS NOTES
SUBJECTIVE:   39 y.o.  female here for routine OB appointment. No Cx pain/vb/lof    She is going to see her PCP for palpitation next week, but forgot to make appointment with neurologist, she said she would do.    OB History    Para Term  AB Living   9 8  8  5   SAB IAB Ectopic Molar Multiple Live Births        7      # Outcome Date GA Lbr Erwin/2nd Weight Sex Type Anes PTL Lv   9 Current            8  17 36w1d   M CS-LTranv   VENICE   7  14   1.588 kg (3 lb 8 oz) F CS-LTranv Spinal  VENICE      Birth Comments: didn't go to NICU   6  11 30w0d  1.361 kg (3 lb) F CS-LTranv Spinal Y VENICE      Birth Comments: baby didn't go to NICU   5  04/25/10 28w0d  0.907 kg (2 lb) M CS-LTranv Spinal Y VENICE   4   23w0d   F Vag-Spont  Y ND   3   23w0d   F Vag-Spont  Y ND   2  04/15/02 24w0d  0.51 kg (1 lb 2 oz) M CS-LTranv Spinal Y VENICE      Birth Comments: Dilated at 16wks, in hosp. ob bedrest, in NICU for 4 months - leanring disability   1  01 22w0d   M Vag-Spont  Y FD      Birth Comments: [place]       Past Medical History:   Diagnosis Date    ADHD     Anxiety     Bipolar disorder (HCC)     Depression     on multiple medications    Seizures (HCC) 2018        Past Surgical History:   Procedure Laterality Date    BREAST LUMPECTOMY Left 2024    LEFT BREAST CENTRAL LUMPECTOMY performed by Kylah Gilmore MD at [facility]    BREAST SURGERY      Removal of cyst on left-2017    BREAST SURGERY Left 2024    Left breast complex cyst excision performed by Kylah Gilmore MD at [facility]     SECTION      x5        Family History   Problem Relation Age of Onset    Diabetes Father     Heart Attack Father     Breast Cancer Maternal Aunt 37    Breast Cancer Maternal Grandmother 44    Diabetes Maternal Grandmother     Cancer Maternal Grandfather 60        lung    Diabetes Maternal Grandfather     Diabetes Paternal

## 2025-03-21 NOTE — PROGRESS NOTES
Pt denies bleeding and lof  Pt stated she has had cramping  Baby is active   
mg/kg/day (approximately 6 times that of a human dose of 30 mg/day [child] on a mg/m2 basis) or greater to pregnant animals. Administration of these doses was also associated with severe maternal toxicity.     A number of studies in rodents indicate that prenatal or early  exposure to amphetamine (d- or d,l-), at doses similar to those used clinically, can result in long-term neurochemical and behavioral alterations. Reported behavioral effects include learning and memory deficits, altered locomotor activity, and changes in sexual function.     There are no adequate and well-controlled studies in pregnant women. There has been one report of severe congenital bony deformity, tracheo-esophageal fistula, and anal atresia (vater association) in a baby born to a woman who took dextroamphetamine sulfate with lovastatin during the first trimester of pregnancy. Amphetamines should be used during pregnancy only if the potential benefit justifies the potential risk to the fetus.      The patient is of advanced maternal age. I advised the patient that she is at increased risk for having a fetus with aneuploidy secondary to advanced maternal age.  Her background risk of having a fetus with any congenital abnormality is 3% to 5%. Her background risk of pregnancy loss is 1% to 2%.      The patient has a history of drinking during her pregnancy. This places her at increased risk for having a fetus with fetal alcohol syndrome (FAS). Fetuses  With this disorder may be seen to have sub-optimal growth, congenital heart defects, including but not limited to, ventricular septal defects and atrial septal defects, small head size, and narrow small eyes with large epicanthal folds..  Children with this disorder may exhibit delayed development and significant functional problems, including but not limited to problems with speech, movement, and social skillls, This disorder cannot be ruled out with ultrasound or  testing.

## 2025-03-22 NOTE — PROGRESS NOTES
Patient is here today for bpp/nst .Denies any vaginal bleeding, cramping, or leakage of fluids.  Pelvic pressure.   Patient reports good fetal movement.     
No numbness, no tingling, no tremors. No history of seizures  ALL OTHER SYSTEMS WERE REPORTED AS NEGATIVE.     Vital signs:   /82   Pulse 84   Temp 98.3 °F (36.8 °C) (Temporal)   Wt 83.5 kg (184 lb)   LMP 2024 (Exact Date) Comment: poic preg in pre op negative  BMI 34.77 kg/m²   Urine dipstick:  Negative Glucose  Trace protein    GENERAL:   The patient is a well developed, female who is alert cooperative and oriented times three in no acute distress.    HEENT:  Normo cephalic and atraumatic. No facial edema.     ABDOMEN:   Her uterus is gravid. She had no complaint of abdominal pain or tenderness. The fetal heart rate is 150 bpm. The fetus was in the cephalic presentation which was confirmed by the ultrasound assessment.    EXTREMITIES:  No peripheral edema is noted.     IMPRESSION:    1.  IUP at 36 weeks 2 days Estimated Date of Delivery: 2025    2.  Previously fell on her side while at work.  She declined ambulance transfer to the hospital for assessment.    3.  Complaint of shortness of breath and tachycardia which have occurred intermittently throughout her pregnancy.     4.  Advanced maternal age. The patient will be 40 years of age at her estimated date of delivery.    5.  O positive blood type    6.  Genetic testing results were not available    7.  Five previous  section deliveries    8.  Positive drug screen for amphetamines and cannabinoids on 2024    9.  Eight previous  deliveries.  Grand multiparity.  10.  History of a seizure disorder  11.  Abdominal cramping, resolved  12.  Hemodynamically stable  13.  Normal cervical length with no apparent funneling of the amniotic membranes 2025  14.  Poor fetal growth.  EFW 2190 g,(23rd%).  AC 6th% on 3/14/2025  15.  History of alcohol use during pregnancy  16.  Declined cervical cerclage.  History of 4 prior mid trimester pregnancy deliveries.  17.  Reassuring biophysical profile, cord and MCA Doppler studies on

## 2025-03-25 ENCOUNTER — ROUTINE PRENATAL (OUTPATIENT)
Dept: OBGYN CLINIC | Age: 40
End: 2025-03-25
Payer: COMMERCIAL

## 2025-03-25 ENCOUNTER — ANCILLARY PROCEDURE (OUTPATIENT)
Dept: OBGYN CLINIC | Age: 40
DRG: 540 | End: 2025-03-25
Payer: COMMERCIAL

## 2025-03-25 VITALS
HEART RATE: 90 BPM | SYSTOLIC BLOOD PRESSURE: 119 MMHG | WEIGHT: 184.4 LBS | BODY MASS INDEX: 34.84 KG/M2 | DIASTOLIC BLOOD PRESSURE: 77 MMHG

## 2025-03-25 DIAGNOSIS — Z87.898 HISTORY OF SEIZURES: ICD-10-CM

## 2025-03-25 DIAGNOSIS — O16.3 ELEVATED BLOOD PRESSURE AFFECTING PREGNANCY IN THIRD TRIMESTER, ANTEPARTUM: Primary | ICD-10-CM

## 2025-03-25 DIAGNOSIS — Z3A.36 36 WEEKS GESTATION OF PREGNANCY: ICD-10-CM

## 2025-03-25 DIAGNOSIS — O34.219 PREVIOUS CESAREAN DELIVERY AFFECTING PREGNANCY, ANTEPARTUM: ICD-10-CM

## 2025-03-25 DIAGNOSIS — Z64.1 GRAND MULTIPARA: ICD-10-CM

## 2025-03-25 DIAGNOSIS — O36.5930 POOR FETAL GROWTH AFFECTING MANAGEMENT OF MOTHER IN THIRD TRIMESTER, SINGLE OR UNSPECIFIED FETUS: ICD-10-CM

## 2025-03-25 DIAGNOSIS — R82.5 POSITIVE URINE DRUG SCREEN: ICD-10-CM

## 2025-03-25 DIAGNOSIS — O09.529 ANTEPARTUM MULTIGRAVIDA OF ADVANCED MATERNAL AGE: ICD-10-CM

## 2025-03-25 LAB
GLUCOSE URINE, POC: NEGATIVE MG/DL
PROTEIN UA: NEGATIVE

## 2025-03-25 PROCEDURE — 81002 URINALYSIS NONAUTO W/O SCOPE: CPT | Performed by: OBSTETRICS & GYNECOLOGY

## 2025-03-25 PROCEDURE — 76821 MIDDLE CEREBRAL ARTERY ECHO: CPT | Performed by: OBSTETRICS & GYNECOLOGY

## 2025-03-25 PROCEDURE — 99213 OFFICE O/P EST LOW 20 MIN: CPT | Performed by: OBSTETRICS & GYNECOLOGY

## 2025-03-25 PROCEDURE — 76818 FETAL BIOPHYS PROFILE W/NST: CPT | Performed by: OBSTETRICS & GYNECOLOGY

## 2025-03-25 PROCEDURE — 76815 OB US LIMITED FETUS(S): CPT | Performed by: OBSTETRICS & GYNECOLOGY

## 2025-03-25 PROCEDURE — 99999 PR OFFICE/OUTPT VISIT,PROCEDURE ONLY: CPT | Performed by: OBSTETRICS & GYNECOLOGY

## 2025-03-25 PROCEDURE — 76820 UMBILICAL ARTERY ECHO: CPT | Performed by: OBSTETRICS & GYNECOLOGY

## 2025-03-27 ENCOUNTER — HOSPITAL ENCOUNTER (EMERGENCY)
Age: 40
Discharge: HOME | DRG: 540 | End: 2025-03-27
Attending: STUDENT IN AN ORGANIZED HEALTH CARE EDUCATION/TRAINING PROGRAM
Payer: COMMERCIAL

## 2025-03-27 VITALS
RESPIRATION RATE: 18 BRPM | SYSTOLIC BLOOD PRESSURE: 111 MMHG | HEART RATE: 93 BPM | DIASTOLIC BLOOD PRESSURE: 68 MMHG | OXYGEN SATURATION: 95 % | TEMPERATURE: 98.6 F

## 2025-03-27 DIAGNOSIS — R07.9 CHEST PAIN, UNSPECIFIED TYPE: Primary | ICD-10-CM

## 2025-03-27 DIAGNOSIS — K21.9 GASTROESOPHAGEAL REFLUX DISEASE WITHOUT ESOPHAGITIS: ICD-10-CM

## 2025-03-27 LAB
ALBUMIN SERPL-MCNC: 3.5 G/DL (ref 3.5–5.2)
ALP SERPL-CCNC: 103 U/L (ref 35–104)
ALT SERPL-CCNC: 14 U/L (ref 0–32)
AMORPH SED URNS QL MICRO: PRESENT
ANION GAP SERPL CALCULATED.3IONS-SCNC: 13 MMOL/L (ref 7–16)
AST SERPL-CCNC: 15 U/L (ref 0–31)
ATYPICAL LYMPHOCYTE ABSOLUTE COUNT: 0.15 K/UL (ref 0–0.46)
ATYPICAL LYMPHOCYTES: 2 % (ref 0–4)
BACTERIA URNS QL MICRO: ABNORMAL
BASOPHILS # BLD: 0 K/UL (ref 0–0.2)
BASOPHILS NFR BLD: 0 % (ref 0–2)
BILIRUB SERPL-MCNC: 0.2 MG/DL (ref 0–1.2)
BILIRUB UR QL STRIP: NEGATIVE
BUN SERPL-MCNC: 11 MG/DL (ref 6–20)
CALCIUM SERPL-MCNC: 9.5 MG/DL (ref 8.6–10.2)
CHLORIDE SERPL-SCNC: 103 MMOL/L (ref 98–107)
CLARITY UR: CLEAR
CO2 SERPL-SCNC: 20 MMOL/L (ref 22–29)
COLOR UR: YELLOW
CREAT SERPL-MCNC: 0.7 MG/DL (ref 0.5–1)
EOSINOPHIL # BLD: 0.23 K/UL (ref 0.05–0.5)
EOSINOPHILS RELATIVE PERCENT: 3 % (ref 0–6)
ERYTHROCYTE [DISTWIDTH] IN BLOOD BY AUTOMATED COUNT: 13.8 % (ref 11.5–15)
GFR, ESTIMATED: >90 ML/MIN/1.73M2
GLUCOSE SERPL-MCNC: 105 MG/DL (ref 74–99)
GLUCOSE UR STRIP-MCNC: NEGATIVE MG/DL
HCT VFR BLD AUTO: 29.6 % (ref 34–48)
HGB BLD-MCNC: 9.4 G/DL (ref 11.5–15.5)
HGB UR QL STRIP.AUTO: NEGATIVE
KETONES UR STRIP-MCNC: ABNORMAL MG/DL
LEUKOCYTE ESTERASE UR QL STRIP: NEGATIVE
LYMPHOCYTES NFR BLD: 1.81 K/UL (ref 1.5–4)
LYMPHOCYTES RELATIVE PERCENT: 20 % (ref 20–42)
MCH RBC QN AUTO: 29 PG (ref 26–35)
MCHC RBC AUTO-ENTMCNC: 31.8 G/DL (ref 32–34.5)
MCV RBC AUTO: 91.4 FL (ref 80–99.9)
MONOCYTES NFR BLD: 0.45 K/UL (ref 0.1–0.95)
MONOCYTES NFR BLD: 5 % (ref 2–12)
NEUTROPHILS NFR BLD: 70 % (ref 43–80)
NEUTS SEG NFR BLD: 6.26 K/UL (ref 1.8–7.3)
NITRITE UR QL STRIP: NEGATIVE
PH UR STRIP: 6.5 [PH] (ref 5–8)
PLATELET # BLD AUTO: 211 K/UL (ref 130–450)
PMV BLD AUTO: 10.3 FL (ref 7–12)
POTASSIUM SERPL-SCNC: 3.6 MMOL/L (ref 3.5–5)
PROT SERPL-MCNC: 6.6 G/DL (ref 6.4–8.3)
PROT UR STRIP-MCNC: NEGATIVE MG/DL
RBC # BLD AUTO: 3.24 M/UL (ref 3.5–5.5)
RBC # BLD: NORMAL 10*6/UL
RBC #/AREA URNS HPF: ABNORMAL /HPF
SODIUM SERPL-SCNC: 136 MMOL/L (ref 132–146)
SP GR UR STRIP: 1.01 (ref 1–1.03)
TROPONIN I SERPL HS-MCNC: <6 NG/L (ref 0–9)
UROBILINOGEN UR STRIP-ACNC: 0.2 EU/DL (ref 0–1)
WBC #/AREA URNS HPF: ABNORMAL /HPF
WBC OTHER # BLD: 8.9 K/UL (ref 4.5–11.5)

## 2025-03-27 PROCEDURE — 99284 EMERGENCY DEPT VISIT MOD MDM: CPT

## 2025-03-27 PROCEDURE — 93005 ELECTROCARDIOGRAM TRACING: CPT | Performed by: STUDENT IN AN ORGANIZED HEALTH CARE EDUCATION/TRAINING PROGRAM

## 2025-03-27 PROCEDURE — 85025 COMPLETE CBC W/AUTO DIFF WBC: CPT

## 2025-03-27 PROCEDURE — 80053 COMPREHEN METABOLIC PANEL: CPT

## 2025-03-27 PROCEDURE — 81001 URINALYSIS AUTO W/SCOPE: CPT

## 2025-03-27 PROCEDURE — 84484 ASSAY OF TROPONIN QUANT: CPT

## 2025-03-27 ASSESSMENT — PAIN - FUNCTIONAL ASSESSMENT: PAIN_FUNCTIONAL_ASSESSMENT: NONE - DENIES PAIN

## 2025-03-28 ENCOUNTER — HOSPITAL ENCOUNTER (INPATIENT)
Age: 40
LOS: 3 days | Discharge: HOME | DRG: 540 | End: 2025-03-31
Attending: STUDENT IN AN ORGANIZED HEALTH CARE EDUCATION/TRAINING PROGRAM | Admitting: OBSTETRICS & GYNECOLOGY
Payer: COMMERCIAL

## 2025-03-28 ENCOUNTER — ANESTHESIA EVENT (OUTPATIENT)
Dept: LABOR AND DELIVERY | Age: 40
End: 2025-03-28
Payer: COMMERCIAL

## 2025-03-28 ENCOUNTER — ANESTHESIA (OUTPATIENT)
Dept: LABOR AND DELIVERY | Age: 40
End: 2025-03-28
Payer: COMMERCIAL

## 2025-03-28 ENCOUNTER — ROUTINE PRENATAL (OUTPATIENT)
Dept: OBGYN | Age: 40
End: 2025-03-28
Payer: COMMERCIAL

## 2025-03-28 VITALS
BODY MASS INDEX: 35.47 KG/M2 | SYSTOLIC BLOOD PRESSURE: 130 MMHG | OXYGEN SATURATION: 98 % | WEIGHT: 187.7 LBS | TEMPERATURE: 98.9 F | DIASTOLIC BLOOD PRESSURE: 85 MMHG | HEART RATE: 97 BPM

## 2025-03-28 DIAGNOSIS — Z34.93 ENCOUNTER FOR SUPERVISION OF NORMAL PREGNANCY IN THIRD TRIMESTER, UNSPECIFIED GRAVIDITY: ICD-10-CM

## 2025-03-28 DIAGNOSIS — Z98.891 HISTORY OF C-SECTION: ICD-10-CM

## 2025-03-28 DIAGNOSIS — Z34.93 PRENATAL CARE IN THIRD TRIMESTER: Primary | ICD-10-CM

## 2025-03-28 PROBLEM — Z3A.37 37 WEEKS GESTATION OF PREGNANCY: Status: ACTIVE | Noted: 2025-03-28

## 2025-03-28 LAB
AMPHET UR QL SCN: POSITIVE
BARBITURATES UR QL SCN: NEGATIVE
BENZODIAZ UR QL: NEGATIVE
BUPRENORPHINE UR QL: NEGATIVE
CANNABINOIDS UR QL SCN: POSITIVE
COCAINE UR QL SCN: NEGATIVE
EKG ATRIAL RATE: 77 BPM
EKG P AXIS: 41 DEGREES
EKG P-R INTERVAL: 134 MS
EKG Q-T INTERVAL: 372 MS
EKG QRS DURATION: 74 MS
EKG QTC CALCULATION (BAZETT): 420 MS
EKG R AXIS: 23 DEGREES
EKG T AXIS: 24 DEGREES
EKG VENTRICULAR RATE: 77 BPM
ERYTHROCYTE [DISTWIDTH] IN BLOOD BY AUTOMATED COUNT: 13.8 % (ref 11.5–15)
FENTANYL UR QL: NEGATIVE
GLUCOSE URINE, POC: NEGATIVE MG/DL
HCT VFR BLD AUTO: 32.8 % (ref 34–48)
HGB BLD-MCNC: 10.1 G/DL (ref 11.5–15.5)
MCH RBC QN AUTO: 28.5 PG (ref 26–35)
MCHC RBC AUTO-ENTMCNC: 30.8 G/DL (ref 32–34.5)
MCV RBC AUTO: 92.7 FL (ref 80–99.9)
METHADONE UR QL: NEGATIVE
OPIATES UR QL SCN: NEGATIVE
OXYCODONE UR QL SCN: NEGATIVE
PCP UR QL SCN: NEGATIVE
PLATELET # BLD AUTO: 235 K/UL (ref 130–450)
PMV BLD AUTO: 10.5 FL (ref 7–12)
PROTEIN UA: NEGATIVE
RBC # BLD AUTO: 3.54 M/UL (ref 3.5–5.5)
TEST INFORMATION: ABNORMAL
WBC OTHER # BLD: 9.2 K/UL (ref 4.5–11.5)

## 2025-03-28 PROCEDURE — G0480 DRUG TEST DEF 1-7 CLASSES: HCPCS

## 2025-03-28 PROCEDURE — 4004F PT TOBACCO SCREEN RCVD TLK: CPT | Performed by: STUDENT IN AN ORGANIZED HEALTH CARE EDUCATION/TRAINING PROGRAM

## 2025-03-28 PROCEDURE — 86901 BLOOD TYPING SEROLOGIC RH(D): CPT

## 2025-03-28 PROCEDURE — 2720000010 HC SURG SUPPLY STERILE: Performed by: STUDENT IN AN ORGANIZED HEALTH CARE EDUCATION/TRAINING PROGRAM

## 2025-03-28 PROCEDURE — 93010 ELECTROCARDIOGRAM REPORT: CPT | Performed by: INTERNAL MEDICINE

## 2025-03-28 PROCEDURE — 86900 BLOOD TYPING SEROLOGIC ABO: CPT

## 2025-03-28 PROCEDURE — 3600000016 HC SURGERY LEVEL 6 ADDTL 15MIN: Performed by: STUDENT IN AN ORGANIZED HEALTH CARE EDUCATION/TRAINING PROGRAM

## 2025-03-28 PROCEDURE — G8427 DOCREV CUR MEDS BY ELIG CLIN: HCPCS | Performed by: STUDENT IN AN ORGANIZED HEALTH CARE EDUCATION/TRAINING PROGRAM

## 2025-03-28 PROCEDURE — 86850 RBC ANTIBODY SCREEN: CPT

## 2025-03-28 PROCEDURE — 81002 URINALYSIS NONAUTO W/O SCOPE: CPT | Performed by: STUDENT IN AN ORGANIZED HEALTH CARE EDUCATION/TRAINING PROGRAM

## 2025-03-28 PROCEDURE — 7100000000 HC PACU RECOVERY - FIRST 15 MIN: Performed by: STUDENT IN AN ORGANIZED HEALTH CARE EDUCATION/TRAINING PROGRAM

## 2025-03-28 PROCEDURE — 99213 OFFICE O/P EST LOW 20 MIN: CPT | Performed by: STUDENT IN AN ORGANIZED HEALTH CARE EDUCATION/TRAINING PROGRAM

## 2025-03-28 PROCEDURE — 3700000001 HC ADD 15 MINUTES (ANESTHESIA): Performed by: STUDENT IN AN ORGANIZED HEALTH CARE EDUCATION/TRAINING PROGRAM

## 2025-03-28 PROCEDURE — 7100000001 HC PACU RECOVERY - ADDTL 15 MIN: Performed by: STUDENT IN AN ORGANIZED HEALTH CARE EDUCATION/TRAINING PROGRAM

## 2025-03-28 PROCEDURE — G8417 CALC BMI ABV UP PARAM F/U: HCPCS | Performed by: STUDENT IN AN ORGANIZED HEALTH CARE EDUCATION/TRAINING PROGRAM

## 2025-03-28 PROCEDURE — APPNB30 APP NON BILLABLE TIME 0-30 MINS

## 2025-03-28 PROCEDURE — 3600000006 HC SURGERY LEVEL 6 BASE: Performed by: STUDENT IN AN ORGANIZED HEALTH CARE EDUCATION/TRAINING PROGRAM

## 2025-03-28 PROCEDURE — 6360000002 HC RX W HCPCS: Performed by: NURSE ANESTHETIST, CERTIFIED REGISTERED

## 2025-03-28 PROCEDURE — 6360000002 HC RX W HCPCS: Performed by: ANESTHESIOLOGY

## 2025-03-28 PROCEDURE — 85027 COMPLETE CBC AUTOMATED: CPT

## 2025-03-28 PROCEDURE — 88307 TISSUE EXAM BY PATHOLOGIST: CPT

## 2025-03-28 PROCEDURE — 2580000003 HC RX 258: Performed by: NURSE ANESTHETIST, CERTIFIED REGISTERED

## 2025-03-28 PROCEDURE — 6370000000 HC RX 637 (ALT 250 FOR IP): Performed by: ANESTHESIOLOGY

## 2025-03-28 PROCEDURE — 86923 COMPATIBILITY TEST ELECTRIC: CPT

## 2025-03-28 PROCEDURE — 80307 DRUG TEST PRSMV CHEM ANLYZR: CPT

## 2025-03-28 PROCEDURE — 6360000002 HC RX W HCPCS: Performed by: OBSTETRICS & GYNECOLOGY

## 2025-03-28 PROCEDURE — 2709999900 HC NON-CHARGEABLE SUPPLY: Performed by: STUDENT IN AN ORGANIZED HEALTH CARE EDUCATION/TRAINING PROGRAM

## 2025-03-28 PROCEDURE — 1220000000 HC SEMI PRIVATE OB R&B

## 2025-03-28 PROCEDURE — 2580000003 HC RX 258

## 2025-03-28 PROCEDURE — 99212 OFFICE O/P EST SF 10 MIN: CPT | Performed by: STUDENT IN AN ORGANIZED HEALTH CARE EDUCATION/TRAINING PROGRAM

## 2025-03-28 PROCEDURE — 3700000000 HC ANESTHESIA ATTENDED CARE: Performed by: STUDENT IN AN ORGANIZED HEALTH CARE EDUCATION/TRAINING PROGRAM

## 2025-03-28 RX ORDER — ONDANSETRON 2 MG/ML
INJECTION INTRAMUSCULAR; INTRAVENOUS
Status: DISCONTINUED | OUTPATIENT
Start: 2025-03-28 | End: 2025-03-28 | Stop reason: SDUPTHER

## 2025-03-28 RX ORDER — OXYCODONE HYDROCHLORIDE 5 MG/1
5 TABLET ORAL EVERY 4 HOURS PRN
Status: DISCONTINUED | OUTPATIENT
Start: 2025-03-29 | End: 2025-03-31 | Stop reason: HOSPADM

## 2025-03-28 RX ORDER — SODIUM CHLORIDE, SODIUM LACTATE, POTASSIUM CHLORIDE, AND CALCIUM CHLORIDE .6; .31; .03; .02 G/100ML; G/100ML; G/100ML; G/100ML
500 INJECTION, SOLUTION INTRAVENOUS ONCE
Status: DISCONTINUED | OUTPATIENT
Start: 2025-03-28 | End: 2025-03-31 | Stop reason: HOSPADM

## 2025-03-28 RX ORDER — BUPIVACAINE HYDROCHLORIDE 7.5 MG/ML
INJECTION, SOLUTION INTRASPINAL
Status: COMPLETED | OUTPATIENT
Start: 2025-03-28 | End: 2025-03-28

## 2025-03-28 RX ORDER — FENTANYL CITRATE 50 UG/ML
INJECTION, SOLUTION INTRAMUSCULAR; INTRAVENOUS
Status: DISCONTINUED | OUTPATIENT
Start: 2025-03-28 | End: 2025-03-28 | Stop reason: SDUPTHER

## 2025-03-28 RX ORDER — DIPHENHYDRAMINE HCL 25 MG
25 TABLET ORAL EVERY 6 HOURS PRN
Status: DISCONTINUED | OUTPATIENT
Start: 2025-03-29 | End: 2025-03-31 | Stop reason: HOSPADM

## 2025-03-28 RX ORDER — PROCHLORPERAZINE EDISYLATE 5 MG/ML
5 INJECTION INTRAMUSCULAR; INTRAVENOUS
Status: DISCONTINUED | OUTPATIENT
Start: 2025-03-28 | End: 2025-03-31 | Stop reason: HOSPADM

## 2025-03-28 RX ORDER — PRENATAL WITH FERROUS FUM AND FOLIC ACID 3080; 920; 120; 400; 22; 1.84; 3; 20; 10; 1; 12; 200; 27; 25; 2 [IU]/1; [IU]/1; MG/1; [IU]/1; MG/1; MG/1; MG/1; MG/1; MG/1; MG/1; UG/1; MG/1; MG/1; MG/1; MG/1
1 TABLET ORAL DAILY
Status: DISCONTINUED | OUTPATIENT
Start: 2025-03-29 | End: 2025-03-31 | Stop reason: HOSPADM

## 2025-03-28 RX ORDER — FENTANYL CITRATE 50 UG/ML
50 INJECTION, SOLUTION INTRAMUSCULAR; INTRAVENOUS EVERY 5 MIN PRN
Status: COMPLETED | OUTPATIENT
Start: 2025-03-28 | End: 2025-03-28

## 2025-03-28 RX ORDER — SODIUM CHLORIDE 9 MG/ML
INJECTION, SOLUTION INTRAVENOUS PRN
Status: DISCONTINUED | OUTPATIENT
Start: 2025-03-28 | End: 2025-03-31 | Stop reason: HOSPADM

## 2025-03-28 RX ORDER — SODIUM CHLORIDE 0.9 % (FLUSH) 0.9 %
5-40 SYRINGE (ML) INJECTION PRN
Status: DISCONTINUED | OUTPATIENT
Start: 2025-03-28 | End: 2025-03-31 | Stop reason: HOSPADM

## 2025-03-28 RX ORDER — SIMETHICONE 80 MG
80 TABLET,CHEWABLE ORAL EVERY 6 HOURS PRN
Status: DISCONTINUED | OUTPATIENT
Start: 2025-03-28 | End: 2025-03-31 | Stop reason: HOSPADM

## 2025-03-28 RX ORDER — DIPHENHYDRAMINE HYDROCHLORIDE 50 MG/ML
25 INJECTION, SOLUTION INTRAMUSCULAR; INTRAVENOUS EVERY 6 HOURS PRN
Status: DISCONTINUED | OUTPATIENT
Start: 2025-03-29 | End: 2025-03-31 | Stop reason: HOSPADM

## 2025-03-28 RX ORDER — ONDANSETRON 4 MG/1
4 TABLET, ORALLY DISINTEGRATING ORAL EVERY 8 HOURS PRN
Status: DISCONTINUED | OUTPATIENT
Start: 2025-03-28 | End: 2025-03-31 | Stop reason: HOSPADM

## 2025-03-28 RX ORDER — KETOROLAC TROMETHAMINE 30 MG/ML
30 INJECTION, SOLUTION INTRAMUSCULAR; INTRAVENOUS EVERY 6 HOURS
Status: DISPENSED | OUTPATIENT
Start: 2025-03-28 | End: 2025-03-29

## 2025-03-28 RX ORDER — SODIUM CHLORIDE, SODIUM LACTATE, POTASSIUM CHLORIDE, CALCIUM CHLORIDE 600; 310; 30; 20 MG/100ML; MG/100ML; MG/100ML; MG/100ML
INJECTION, SOLUTION INTRAVENOUS CONTINUOUS
Status: DISCONTINUED | OUTPATIENT
Start: 2025-03-28 | End: 2025-03-31 | Stop reason: HOSPADM

## 2025-03-28 RX ORDER — ONDANSETRON 2 MG/ML
4 INJECTION INTRAMUSCULAR; INTRAVENOUS EVERY 6 HOURS PRN
Status: ACTIVE | OUTPATIENT
Start: 2025-03-28 | End: 2025-03-29

## 2025-03-28 RX ORDER — MIDAZOLAM HYDROCHLORIDE 1 MG/ML
INJECTION, SOLUTION INTRAMUSCULAR; INTRAVENOUS
Status: DISCONTINUED | OUTPATIENT
Start: 2025-03-28 | End: 2025-03-28 | Stop reason: SDUPTHER

## 2025-03-28 RX ORDER — SODIUM CHLORIDE 0.9 % (FLUSH) 0.9 %
5-40 SYRINGE (ML) INJECTION EVERY 12 HOURS SCHEDULED
Status: DISCONTINUED | OUTPATIENT
Start: 2025-03-28 | End: 2025-03-31 | Stop reason: HOSPADM

## 2025-03-28 RX ORDER — ONDANSETRON 2 MG/ML
4 INJECTION INTRAMUSCULAR; INTRAVENOUS EVERY 6 HOURS PRN
Status: DISCONTINUED | OUTPATIENT
Start: 2025-03-28 | End: 2025-03-31 | Stop reason: HOSPADM

## 2025-03-28 RX ORDER — ACETAMINOPHEN 500 MG
1000 TABLET ORAL EVERY 8 HOURS SCHEDULED
Status: DISCONTINUED | OUTPATIENT
Start: 2025-03-28 | End: 2025-03-31 | Stop reason: HOSPADM

## 2025-03-28 RX ORDER — OXYCODONE HYDROCHLORIDE 5 MG/1
10 TABLET ORAL EVERY 4 HOURS PRN
Status: DISCONTINUED | OUTPATIENT
Start: 2025-03-29 | End: 2025-03-31 | Stop reason: HOSPADM

## 2025-03-28 RX ORDER — FERROUS SULFATE 325(65) MG
325 TABLET ORAL EVERY OTHER DAY
Status: DISCONTINUED | OUTPATIENT
Start: 2025-03-29 | End: 2025-03-31 | Stop reason: HOSPADM

## 2025-03-28 RX ORDER — ONDANSETRON 4 MG/1
4 TABLET, ORALLY DISINTEGRATING ORAL EVERY 6 HOURS PRN
Status: ACTIVE | OUTPATIENT
Start: 2025-03-28 | End: 2025-03-29

## 2025-03-28 RX ORDER — CITRIC ACID/SODIUM CITRATE 334-500MG
30 SOLUTION, ORAL ORAL ONCE
Status: DISCONTINUED | OUTPATIENT
Start: 2025-03-28 | End: 2025-03-31 | Stop reason: HOSPADM

## 2025-03-28 RX ORDER — MORPHINE SULFATE 1 MG/ML
INJECTION, SOLUTION EPIDURAL; INTRATHECAL; INTRAVENOUS
Status: DISCONTINUED | OUTPATIENT
Start: 2025-03-28 | End: 2025-03-28 | Stop reason: SDUPTHER

## 2025-03-28 RX ORDER — MODIFIED LANOLIN
OINTMENT (GRAM) TOPICAL
Status: DISCONTINUED | OUTPATIENT
Start: 2025-03-28 | End: 2025-03-31 | Stop reason: HOSPADM

## 2025-03-28 RX ORDER — FENTANYL CITRATE 50 UG/ML
25 INJECTION, SOLUTION INTRAMUSCULAR; INTRAVENOUS EVERY 5 MIN PRN
Status: DISCONTINUED | OUTPATIENT
Start: 2025-03-28 | End: 2025-03-31 | Stop reason: HOSPADM

## 2025-03-28 RX ORDER — SODIUM CHLORIDE 0.9 % (FLUSH) 0.9 %
10 SYRINGE (ML) INJECTION PRN
Status: DISCONTINUED | OUTPATIENT
Start: 2025-03-28 | End: 2025-03-31 | Stop reason: HOSPADM

## 2025-03-28 RX ORDER — BISACODYL 10 MG
10 SUPPOSITORY, RECTAL RECTAL DAILY PRN
Status: DISCONTINUED | OUTPATIENT
Start: 2025-03-28 | End: 2025-03-31 | Stop reason: HOSPADM

## 2025-03-28 RX ORDER — DOCUSATE SODIUM 100 MG/1
100 CAPSULE, LIQUID FILLED ORAL 2 TIMES DAILY
Status: DISCONTINUED | OUTPATIENT
Start: 2025-03-28 | End: 2025-03-31 | Stop reason: HOSPADM

## 2025-03-28 RX ORDER — PROPOFOL 10 MG/ML
INJECTION, EMULSION INTRAVENOUS
Status: DISCONTINUED | OUTPATIENT
Start: 2025-03-28 | End: 2025-03-28 | Stop reason: SDUPTHER

## 2025-03-28 RX ORDER — KETOROLAC TROMETHAMINE 30 MG/ML
15 INJECTION, SOLUTION INTRAMUSCULAR; INTRAVENOUS EVERY 6 HOURS PRN
Status: COMPLETED | OUTPATIENT
Start: 2025-03-28 | End: 2025-03-29

## 2025-03-28 RX ORDER — IBUPROFEN 800 MG/1
800 TABLET, FILM COATED ORAL EVERY 8 HOURS
Status: DISCONTINUED | OUTPATIENT
Start: 2025-03-29 | End: 2025-03-31 | Stop reason: HOSPADM

## 2025-03-28 RX ORDER — OXYCODONE HYDROCHLORIDE 5 MG/1
10 TABLET ORAL EVERY 4 HOURS PRN
Status: DISPENSED | OUTPATIENT
Start: 2025-03-28 | End: 2025-03-29

## 2025-03-28 RX ORDER — OXYCODONE HYDROCHLORIDE 5 MG/1
5 TABLET ORAL EVERY 4 HOURS PRN
Status: DISPENSED | OUTPATIENT
Start: 2025-03-28 | End: 2025-03-29

## 2025-03-28 RX ORDER — NALOXONE HYDROCHLORIDE 0.4 MG/ML
INJECTION, SOLUTION INTRAMUSCULAR; INTRAVENOUS; SUBCUTANEOUS PRN
Status: DISCONTINUED | OUTPATIENT
Start: 2025-03-28 | End: 2025-03-31 | Stop reason: HOSPADM

## 2025-03-28 RX ORDER — SODIUM CHLORIDE, SODIUM LACTATE, POTASSIUM CHLORIDE, AND CALCIUM CHLORIDE .6; .31; .03; .02 G/100ML; G/100ML; G/100ML; G/100ML
1000 INJECTION, SOLUTION INTRAVENOUS ONCE
Status: DISCONTINUED | OUTPATIENT
Start: 2025-03-28 | End: 2025-03-31 | Stop reason: HOSPADM

## 2025-03-28 RX ORDER — DIPHENHYDRAMINE HYDROCHLORIDE 50 MG/ML
25 INJECTION, SOLUTION INTRAMUSCULAR; INTRAVENOUS EVERY 6 HOURS PRN
Status: ACTIVE | OUTPATIENT
Start: 2025-03-28 | End: 2025-03-29

## 2025-03-28 RX ORDER — DIPHENHYDRAMINE HCL 25 MG
25 TABLET ORAL EVERY 6 HOURS PRN
Status: ACTIVE | OUTPATIENT
Start: 2025-03-28 | End: 2025-03-29

## 2025-03-28 RX ADMIN — Medication 909 ML/HR: at 19:26

## 2025-03-28 RX ADMIN — PROPOFOL 20 MG: 10 INJECTION, EMULSION INTRAVENOUS at 19:40

## 2025-03-28 RX ADMIN — MIDAZOLAM 1 MG: 1 INJECTION INTRAMUSCULAR; INTRAVENOUS at 19:36

## 2025-03-28 RX ADMIN — KETOROLAC TROMETHAMINE 15 MG: 30 INJECTION, SOLUTION INTRAMUSCULAR at 23:40

## 2025-03-28 RX ADMIN — PROPOFOL 10 MG: 10 INJECTION, EMULSION INTRAVENOUS at 19:50

## 2025-03-28 RX ADMIN — FENTANYL CITRATE 10 MCG: 50 INJECTION, SOLUTION INTRAMUSCULAR; INTRAVENOUS at 19:05

## 2025-03-28 RX ADMIN — FENTANYL CITRATE 40 MCG: 50 INJECTION, SOLUTION INTRAMUSCULAR; INTRAVENOUS at 19:36

## 2025-03-28 RX ADMIN — FENTANYL CITRATE 50 MCG: 50 INJECTION INTRAMUSCULAR; INTRAVENOUS at 22:00

## 2025-03-28 RX ADMIN — MIDAZOLAM 1 MG: 1 INJECTION INTRAMUSCULAR; INTRAVENOUS at 19:37

## 2025-03-28 RX ADMIN — SODIUM CHLORIDE, POTASSIUM CHLORIDE, SODIUM LACTATE AND CALCIUM CHLORIDE: 600; 310; 30; 20 INJECTION, SOLUTION INTRAVENOUS at 19:56

## 2025-03-28 RX ADMIN — BUPIVACAINE HYDROCHLORIDE 12 MG: 7.5 INJECTION, SOLUTION SUBARACHNOID at 19:05

## 2025-03-28 RX ADMIN — SODIUM CHLORIDE, POTASSIUM CHLORIDE, SODIUM LACTATE AND CALCIUM CHLORIDE: 600; 310; 30; 20 INJECTION, SOLUTION INTRAVENOUS at 18:47

## 2025-03-28 RX ADMIN — FENTANYL CITRATE 50 MCG: 50 INJECTION, SOLUTION INTRAMUSCULAR; INTRAVENOUS at 19:37

## 2025-03-28 RX ADMIN — PROPOFOL 10 MG: 10 INJECTION, EMULSION INTRAVENOUS at 19:44

## 2025-03-28 RX ADMIN — FENTANYL CITRATE 50 MCG: 50 INJECTION INTRAMUSCULAR; INTRAVENOUS at 22:21

## 2025-03-28 RX ADMIN — MORPHINE SULFATE 0.15 MG: 1 INJECTION, SOLUTION EPIDURAL; INTRATHECAL; INTRAVENOUS at 19:05

## 2025-03-28 RX ADMIN — OXYCODONE HYDROCHLORIDE 5 MG: 5 TABLET ORAL at 20:53

## 2025-03-28 RX ADMIN — PHENYLEPHRINE HYDROCHLORIDE 75 MCG/MIN: 10 INJECTION INTRAVENOUS at 18:47

## 2025-03-28 RX ADMIN — ONDANSETRON 4 MG: 2 INJECTION INTRAMUSCULAR; INTRAVENOUS at 19:24

## 2025-03-28 ASSESSMENT — ENCOUNTER SYMPTOMS
SHORTNESS OF BREATH: 1
DYSPNEA ACTIVITY LEVEL: AFTER AMBULATING 2 FLIGHTS OF STAIRS

## 2025-03-28 ASSESSMENT — PAIN DESCRIPTION - LOCATION
LOCATION: INCISION

## 2025-03-28 ASSESSMENT — LIFESTYLE VARIABLES: SMOKING_STATUS: 1

## 2025-03-28 ASSESSMENT — PAIN SCALES - GENERAL
PAINLEVEL_OUTOF10: 8
PAINLEVEL_OUTOF10: 6

## 2025-03-28 NOTE — DISCHARGE INSTRUCTIONS
Follow-up with your OB.  If you develop any abdominal pain or vaginal discharge you should return to the emergency room and go to labor and delivery.

## 2025-03-28 NOTE — PROGRESS NOTES
Patient alert and pleasant with complaints of pressure and tightening. Did has some left arm pain and chest pain which was assessed at the hospital and was told it was from the esophagus.  Here today for prenatal visit.  Fetal heart tones obtained without difficulty.  Urine for glucose and protein obtained with negative results.  Discharge instructions have been discussed with the patient. Patient advised to call our office with any questions or concerns.   Voiced understanding.

## 2025-03-28 NOTE — ANESTHESIA PROCEDURE NOTES
Spinal Block    Patient location during procedure: OB  End time: 3/28/2025 7:05 PM  Reason for block: post-op pain management, primary anesthetic and at surgeon's request  Staffing  Performed: resident/CRNA   Anesthesiologist: Edith Parker MD  Resident/CRNA: Allegra Li APRN - CRNA  Other anesthesia staff: Juan Ha RN  Performed by: Allegra Li APRN - CRNA  Authorized by: Allegra Li APRN - CRNA    Spinal Block  Patient position: sitting  Prep: ChloraPrep  Patient monitoring: continuous pulse ox and frequent blood pressure checks  Approach: midline  Location: L3/L4  Provider prep: mask and sterile gloves  Local infiltration: lidocaine  Needle  Needle type: Beléncke   Needle gauge: 25 G  Needle length: 3.5 in  Assessment  Sensory level: T4  Swirl obtained: Yes  CSF: clear  Attempts: 3+ (first attempt by student. Placed by CRNA)  Hemodynamics: stable  Preanesthetic Checklist  Completed: patient identified, IV checked, site marked, risks and benefits discussed, surgical/procedural consents, equipment checked, pre-op evaluation, timeout performed, anesthesia consent given, oxygen available and monitors applied/VS acknowledged

## 2025-03-28 NOTE — ED PROVIDER NOTES
[facility] EMERGENCY DEPARTMENT  EMERGENCY DEPARTMENT ENCOUNTER    Pt Name: Jesse Jack  MRN: [id number]  Birthdate 1985  Date of evaluation: 3/27/2025  Provider: Zach Hair MD  PCP: No primary care provider on file.  Note Started: 9:55 PM EDT 3/27/25    HPI     Patient is a 39 y.o. female presents with a chief complaint of   Chief Complaint   Patient presents with    Chest Pain     After eating, 37 weeks pregnant   .    Patient is a  with a history of 3 stillborn's around 20 weeks and 1 miscarriage about 2 months.  Patient presented because she was eating chicken and gravy and developed epigastric pain that went into her chest.  Patient stated that the symptoms are completely resolved.  Patient is denying any nausea or vomiting.  No abdominal pain.  No changes in urinary or bowel habits.  No vaginal discharge.  Patient states that    Nursing Notes were all reviewed and agreed with or any disagreements were addressed in the HPI.    History From: patient    Review of Systems   Pertinent positives and negatives as per HPI.     Physical Exam  Vitals and nursing note reviewed.   Constitutional:       Appearance: She is well-developed.   HENT:      Head: Normocephalic and atraumatic.   Eyes:      Conjunctiva/sclera: Conjunctivae normal.   Cardiovascular:      Rate and Rhythm: Normal rate and regular rhythm.      Heart sounds: Normal heart sounds. No murmur heard.  Pulmonary:      Effort: Pulmonary effort is normal. No respiratory distress.      Breath sounds: Normal breath sounds. No wheezing or rales.   Abdominal:      General: Bowel sounds are normal.      Palpations: Abdomen is soft.      Tenderness: There is no abdominal tenderness. There is no guarding or rebound.      Comments: Gravid abdomen with no tenderness to palpation.   Musculoskeletal:      Cervical back: Normal range of motion and neck supple.   Skin:     General: Skin is warm and dry.   Neurological:  Medication List as of 3/27/2025 11:03 PM          Diagnosis:  1. Chest pain, unspecified type    2. Gastroesophageal reflux disease without esophagitis        Disposition:  Patient's disposition: Discharge to home  Patient's condition is stable.                                         Zach Hair MD  03/27/25 4717

## 2025-03-28 NOTE — PROGRESS NOTES
Pt denies bleeding and lof  Pt stated she has had cramping  Baby is active   
administration of d-amphetamine doses of 50 mg/kg/day (approximately 6 times that of a human dose of 30 mg/day [child] on a mg/m2 basis) or greater to pregnant animals. Administration of these doses was also associated with severe maternal toxicity.     A number of studies in rodents indicate that prenatal or early  exposure to amphetamine (d- or d,l-), at doses similar to those used clinically, can result in long-term neurochemical and behavioral alterations. Reported behavioral effects include learning and memory deficits, altered locomotor activity, and changes in sexual function.     There are no adequate and well-controlled studies in pregnant women. There has been one report of severe congenital bony deformity, tracheo-esophageal fistula, and anal atresia (vater association) in a baby born to a woman who took dextroamphetamine sulfate with lovastatin during the first trimester of pregnancy. Amphetamines should be used during pregnancy only if the potential benefit justifies the potential risk to the fetus.      The patient is of advanced maternal age. I advised the patient that she is at increased risk for having a fetus with aneuploidy secondary to advanced maternal age.  Her background risk of having a fetus with any congenital abnormality is 3% to 5%. Her background risk of pregnancy loss is 1% to 2%.      The patient has a history of drinking during her pregnancy. This places her at increased risk for having a fetus with fetal alcohol syndrome (FAS). Fetuses  With this disorder may be seen to have sub-optimal growth, congenital heart defects, including but not limited to, ventricular septal defects and atrial septal defects, small head size, and narrow small eyes with large epicanthal folds..  Children with this disorder may exhibit delayed development and significant functional problems, including but not limited to problems with speech, movement, and social skillls, This disorder cannot be ruled

## 2025-03-28 NOTE — OP NOTE
PREOPERATIVE DIAGNOSES:     37w2d intrauterine pregnancy.  2.  Previous  section x5  3. Previous classical incision  4. Incisional pain      POSTOPERATIVE DIAGNOSES:     Same.      PROCEDURE: repeat low transverse  section        SURGEON: Maura Melendez D.O.    ASSISTANT: Dr. Olena BALL BLOOD LOSS:  1270 cc        COMPLICATIONS:  None.         ANESTHESIA:   Spinal anesthesia        FINDINGS:   Live Born  Sex:  male  Fetal Position: Cephalic   Apgars:  1 minute: 8; 5 minute: 9  Weight:  6 pounds, 0 ounces  Tubes, uterus, ovaries:  thin lower uterine segment, vesicouterine adhesions, anterior abdominal wall adhesions          DETAILS OF PROCEDURE:    The patient was taken to the operating room where Spinal anesthesia was administered and found to be adequate. Abdomen was prepped   and draped in the normal sterile fashion. Fox catheter had previously been   placed. Pfannenstiel skin incision made with a scalpel, carried down to the fascia with cautery. The fascia was nicked in the midline and extended laterally with   cautery. Muscles were  in the midline. Peritoneum was grasped with   hemostats, tented up, and entered sharply. Uterine adhesions to the anterior abdominal wall were lysed with cautery. Peritoneal incision was extended   superiorly and inferiorly with good visualization of bladder. Hemant   retractor was placed.  Bladder flap was created with sharp dissection. Low transverse uterine incision was made with a scalpel and extended bluntly. Membranes ruptured for Clear fluid. A viable male infant was delivered in the cephalic presentation without difficulty.  Baby was bulb suctioned on the abdomen. Cord was clamped and cut, and handed to waiting R.N. Cord gases and cord blood were obtained. Placenta was extracted with a 3 vessel cord. Uterus was cleared of all clot and debris. Uterine incision   was closed with 0 Chromic in a running locked fashion. A 2nd imbricating   layer  of 0 Chromic in running fashion was placed. Good hemostasis was   noted. The pelvis was cleared of all clot and debris. Sno was placed at the lower uterine segment. Interceed was placed over the uterus in the midline.   Fascia was closed with 1-0 Stratafix in a running fashion. Subcutaneous tissue was irrigated, made hemostatic, and approximated with 3-0 Stratafix  suture. Skin was closed 4-0 undyed vicryl in a subcuticular fashion. Baby and   mom to recovery room in stable condition. Placenta to pathology: Yes.  In the absence of a resident, Dr. Hyatt assisted with retraction, exposure and delivery of a live infant and suture management/cutting throughout the case.      Electronically signed by Maura Melendez DO on 3/28/2025 at 8:20 PM

## 2025-03-28 NOTE — PROGRESS NOTES
Patient presents to antepartum with complaints of abdominal pain and contractions. Patient reports +FM.  Reports her pain is at her incision site and feels like a stabbing pain with her contractions.   Patient denies LOF and VB.   EFM applied

## 2025-03-28 NOTE — PROGRESS NOTES
Pt with continued incisional pain. Previous classical c/s. Will proceed with repeat c/s. R/B/A explained. Risk of hysterectomy and blood transfusion explained. Pt agreeable to plan. 2 units on hold.

## 2025-03-28 NOTE — PROGRESS NOTES
RN requesting tracing review - concern for marked variability and decels  Continues to have incisional pain consistent with initial presentation to hospital  Tracing reviewed and indeterminate at this time.  Reviewed with Dr. Melendez who plans to evaluate  After reviewing patient and history, plan for repeat  delivery now per Dr. Melendez  Plan for type and cross with 2 units of blood on hold  Plan for NICU at delivery    Carmen He, PAULA - MICHELLEM

## 2025-03-28 NOTE — ANESTHESIA PRE PROCEDURE
physical exam updated, chart reviewed including anesthesia, drug and allergy history.      H&P reviewed.  No interval changes to history or physical examination (unless noted above).    NPO status confirmed.    Anesthetic plan, risks, benefits, alternatives discussed with patient.    Patient verbalized an understanding and agrees to proceed.     Edith Parker MD  Anesthesiologist

## 2025-03-28 NOTE — H&P
[facility]   Labor and Delivery   OB History and Physical        Patient Name: Jesse Jack  Patient : 1985  MRN: [id number]   Room/Bed: 0332/0332-A    [place] Provider[place]. Fajardo [facility]    SUBJECTIVE:    CHIEF COMPLAINT:    Chief Complaint   Patient presents with    Abdominal Pain       HISTORY OF PRESENT ILLNESS:      Jesse Cochran a 39 y.o. female at 37w2d presents with lower abdominal and pelvic pain intermittent coming and going irregularly. Pain occurs every 2-15 minutes. Pain is more intense with walking and activity. She also has low back pain since yesterday. She has urinary frequency and urgency with dribbling. She has been constipated throughout pregnancy but has had diarrhea the past 2 days.      She was seen in hospital for chest pain yesterday which has resolved.    Fetal Movement: Normal  Contractions: unsure  Leaking Fluid: No  Vaginal bleeding: No    Accept blood products: yes  Code Status: Full    Last PO Intake: 0900    REVIEW OF SYSTEMS:  Review of Systems   Constitutional: Negative.  Negative for chills and fever.   Respiratory: Negative.  Negative for shortness of breath and wheezing.    Cardiovascular: Negative.  Negative for chest pain and palpitations.   Gastrointestinal:  Positive for abdominal pain and diarrhea (x2 days). Negative for constipation, nausea and vomiting.   Genitourinary:  Positive for decreased urine volume, frequency, pelvic pain, urgency and vaginal pain. Negative for dysuria, flank pain, vaginal bleeding and vaginal discharge.   Musculoskeletal:  Positive for back pain.   Skin: Negative.  Negative for color change and rash.   Neurological: Negative.  Negative for dizziness, weakness and light-headedness.   Psychiatric/Behavioral: Negative.          OBJECTIVE:     Estimated Due Date:   Estimated Date of Delivery: 25   Patient's last menstrual period was 2024 (exact date).      PREGNANCY RISK FACTORS:       Patient Active Problem List  pain    [place]. Melendez, [facility] [place]    PLAN:     Admit for observation    Abdominal Pain  -R/O PTL       -Cervix closed and thin       -Consider recheck cervix with continued symptoms  -R/O infection        -CBC, UA urine culture        - Afebrile, no maternal or fetal tachycardia        - No uterine tenderness  -R/O Abruption       -No vaginal bleeding       -No uterine tenderness or uterine rigidity       -FHT reassuring, uterus soft by palpation at rest       -CBC, Coags   R/O Uterine rupture        -Hx LTCS x 5       -FHT reassuring at this time       -Continue to monitor, adjust toco and monitor contraction pattern      Nursing to follow up with  PAULA Stern - FARHAT

## 2025-03-28 NOTE — PROGRESS NOTES
Dr Melendez at bedside. Decision made for C/S.   Will call in ER backup physician, time pending.    13-Jul-2021 16:41

## 2025-03-28 NOTE — PROGRESS NOTES
SUBJECTIVE:   39 y.o.  female here for routine OB appointment. +ve FM, no vb/lof    C/o lower abd tightness/pain since yesterday, very uncomfortable    OB History    Para Term  AB Living   9 8  8  5   SAB IAB Ectopic Molar Multiple Live Births        7      # Outcome Date GA Lbr Erwin/2nd Weight Sex Type Anes PTL Lv   9 Current            8  17 36w1d   M CS-LTranv   VENICE   7  14   1.588 kg (3 lb 8 oz) F CS-LTranv Spinal  VENICE      Birth Comments: didn't go to NICU   6  11 30w0d  1.361 kg (3 lb) F CS-LTranv Spinal Y VENICE      Birth Comments: baby didn't go to NICU   5  04/25/10 28w0d  0.907 kg (2 lb) M CS-LTranv Spinal Y VENICE   4   23w0d   F Vag-Spont  Y ND   3   23w0d   F Vag-Spont  Y ND   2  04/15/02 24w0d  0.51 kg (1 lb 2 oz) M CS-LTranv Spinal Y VENICE      Birth Comments: Dilated at 16wks, in hosp. ob bedrest, in NICU for 4 months - leanring disability   1  01 22w0d   M Vag-Spont  Y FD      Birth Comments: [place]       Past Medical History:   Diagnosis Date    ADHD     Anxiety     Bipolar disorder (HCC)     Depression     on multiple medications    Seizures (HCC) 2018        Past Surgical History:   Procedure Laterality Date    BREAST LUMPECTOMY Left 2024    LEFT BREAST CENTRAL LUMPECTOMY performed by Kylah Gilmore MD at [facility] [place]    BREAST SURGERY      Removal of cyst on left-2017    BREAST SURGERY Left 2024    Left breast complex cyst excision performed by Kylah Gilmore MD at [facility] [place]     SECTION      x5        Family History   Problem Relation Age of Onset    Diabetes Father     Heart Attack Father     Breast Cancer Maternal Aunt 37    Breast Cancer Maternal Grandmother 44    Diabetes Maternal Grandmother     Cancer Maternal Grandfather 60        lung    Diabetes Maternal Grandfather     Diabetes Paternal Grandmother     Breast Cancer Maternal Cousin 36    Cancer Maternal

## 2025-03-29 LAB
ERYTHROCYTE [DISTWIDTH] IN BLOOD BY AUTOMATED COUNT: 13.7 % (ref 11.5–15)
HCT VFR BLD AUTO: 29.9 % (ref 34–48)
HGB BLD-MCNC: 9.3 G/DL (ref 11.5–15.5)
MCH RBC QN AUTO: 28.8 PG (ref 26–35)
MCHC RBC AUTO-ENTMCNC: 31.1 G/DL (ref 32–34.5)
MCV RBC AUTO: 92.6 FL (ref 80–99.9)
PLATELET # BLD AUTO: 192 K/UL (ref 130–450)
PMV BLD AUTO: 10.7 FL (ref 7–12)
RBC # BLD AUTO: 3.23 M/UL (ref 3.5–5.5)
WBC OTHER # BLD: 9.5 K/UL (ref 4.5–11.5)

## 2025-03-29 PROCEDURE — 6370000000 HC RX 637 (ALT 250 FOR IP): Performed by: OBSTETRICS & GYNECOLOGY

## 2025-03-29 PROCEDURE — 6370000000 HC RX 637 (ALT 250 FOR IP)

## 2025-03-29 PROCEDURE — 6360000002 HC RX W HCPCS: Performed by: ANESTHESIOLOGY

## 2025-03-29 PROCEDURE — 85027 COMPLETE CBC AUTOMATED: CPT

## 2025-03-29 PROCEDURE — 1220000000 HC SEMI PRIVATE OB R&B

## 2025-03-29 PROCEDURE — 2500000003 HC RX 250 WO HCPCS: Performed by: OBSTETRICS & GYNECOLOGY

## 2025-03-29 PROCEDURE — 2500000003 HC RX 250 WO HCPCS: Performed by: ANESTHESIOLOGY

## 2025-03-29 PROCEDURE — 6370000000 HC RX 637 (ALT 250 FOR IP): Performed by: ANESTHESIOLOGY

## 2025-03-29 RX ORDER — IBUPROFEN 800 MG/1
TABLET, FILM COATED ORAL
Status: COMPLETED
Start: 2025-03-29 | End: 2025-03-29

## 2025-03-29 RX ADMIN — ACETAMINOPHEN 1000 MG: 500 TABLET ORAL at 18:46

## 2025-03-29 RX ADMIN — PRENATAL WITH FERROUS FUM AND FOLIC ACID 1 TABLET: 3080; 920; 120; 400; 22; 1.84; 3; 20; 10; 1; 12; 200; 27; 25; 2 TABLET ORAL at 07:46

## 2025-03-29 RX ADMIN — ACETAMINOPHEN 1000 MG: 500 TABLET ORAL at 07:46

## 2025-03-29 RX ADMIN — KETOROLAC TROMETHAMINE 15 MG: 30 INJECTION, SOLUTION INTRAMUSCULAR at 05:48

## 2025-03-29 RX ADMIN — DOCUSATE SODIUM 100 MG: 100 CAPSULE, LIQUID FILLED ORAL at 07:47

## 2025-03-29 RX ADMIN — SIMETHICONE 80 MG: 80 TABLET, CHEWABLE ORAL at 13:14

## 2025-03-29 RX ADMIN — SODIUM CHLORIDE, PRESERVATIVE FREE 10 ML: 5 INJECTION INTRAVENOUS at 12:16

## 2025-03-29 RX ADMIN — OXYCODONE HYDROCHLORIDE 10 MG: 5 TABLET ORAL at 18:44

## 2025-03-29 RX ADMIN — OXYCODONE HYDROCHLORIDE 10 MG: 5 TABLET ORAL at 08:42

## 2025-03-29 RX ADMIN — OXYCODONE HYDROCHLORIDE 10 MG: 5 TABLET ORAL at 00:30

## 2025-03-29 RX ADMIN — OXYCODONE HYDROCHLORIDE 10 MG: 5 TABLET ORAL at 04:35

## 2025-03-29 RX ADMIN — OXYCODONE HYDROCHLORIDE 10 MG: 5 TABLET ORAL at 22:56

## 2025-03-29 RX ADMIN — OXYCODONE HYDROCHLORIDE 10 MG: 5 TABLET ORAL at 13:13

## 2025-03-29 RX ADMIN — SODIUM CHLORIDE, PRESERVATIVE FREE 10 ML: 5 INJECTION INTRAVENOUS at 07:49

## 2025-03-29 RX ADMIN — IBUPROFEN 800 MG: 800 TABLET, FILM COATED ORAL at 20:35

## 2025-03-29 RX ADMIN — KETOROLAC TROMETHAMINE 15 MG: 30 INJECTION, SOLUTION INTRAMUSCULAR at 12:15

## 2025-03-29 RX ADMIN — SODIUM CHLORIDE, PRESERVATIVE FREE 8 ML: 5 INJECTION INTRAVENOUS at 23:50

## 2025-03-29 RX ADMIN — DOCUSATE SODIUM 100 MG: 100 CAPSULE, LIQUID FILLED ORAL at 20:36

## 2025-03-29 RX ADMIN — ACETAMINOPHEN 1000 MG: 500 TABLET ORAL at 00:30

## 2025-03-29 RX ADMIN — FERROUS SULFATE TAB 325 MG (65 MG ELEMENTAL FE) 325 MG: 325 (65 FE) TAB at 07:46

## 2025-03-29 RX ADMIN — SIMETHICONE 80 MG: 80 TABLET, CHEWABLE ORAL at 07:47

## 2025-03-29 ASSESSMENT — PAIN DESCRIPTION - DESCRIPTORS
DESCRIPTORS: SORE;DISCOMFORT
DESCRIPTORS: SORE;DISCOMFORT
DESCRIPTORS: SORE;BURNING
DESCRIPTORS: SORE;DISCOMFORT
DESCRIPTORS: DISCOMFORT;SORE;CRAMPING
DESCRIPTORS: CRAMPING;SORE
DESCRIPTORS: CRAMPING;SORE;DISCOMFORT

## 2025-03-29 ASSESSMENT — PAIN DESCRIPTION - LOCATION
LOCATION: ABDOMEN;INCISION
LOCATION: ABDOMEN
LOCATION: ABDOMEN;INCISION
LOCATION: ABDOMEN
LOCATION: ABDOMEN;INCISION

## 2025-03-29 ASSESSMENT — PAIN - FUNCTIONAL ASSESSMENT
PAIN_FUNCTIONAL_ASSESSMENT: ACTIVITIES ARE NOT PREVENTED

## 2025-03-29 ASSESSMENT — PAIN SCALES - GENERAL
PAINLEVEL_OUTOF10: 7
PAINLEVEL_OUTOF10: 8
PAINLEVEL_OUTOF10: 3
PAINLEVEL_OUTOF10: 8
PAINLEVEL_OUTOF10: 9
PAINLEVEL_OUTOF10: 7
PAINLEVEL_OUTOF10: 9
PAINLEVEL_OUTOF10: 4
PAINLEVEL_OUTOF10: 9
PAINLEVEL_OUTOF10: 7

## 2025-03-29 ASSESSMENT — PAIN DESCRIPTION - ORIENTATION
ORIENTATION: LOWER

## 2025-03-29 NOTE — PROGRESS NOTES
Patient admitted into room and oriented to surroundings. Introduced self and wrote this RN's name and phone extension on patient's white board. Phone and nurse's call light at patient's bedside and instructed to use for any needs. Patient instructed on new admission informational packet at bedside with information on infant testing to be done when infant is 24 hours old including ODH labs, 24 hours blood sugar, and CCHD. Patient instructed on mom baby unit policies and procedures including infant safety and fall prevention, of need to keep infant in bassinet for transport in hallways, and for infant to sleep alone, on back, in an empty bassinet. Patient also instructed on unit visitation policy and that one same support person 18 years old or older may stay overnight if desired. Patient verbalized understanding of all of the above. Declined Tdap vaccine.

## 2025-03-29 NOTE — LACTATION NOTE
Introduced myself to patient. We discussed her feeding intentions and if she would like to pump. She plans to formula feed baby.

## 2025-03-29 NOTE — PLAN OF CARE
Problem: Pain  Goal: Verbalizes/displays adequate comfort level or baseline comfort level  Outcome: Progressing     Problem: Vaginal Birth or  Section  Goal: Fetal and maternal status remain reassuring during the birth process  Description:  Birth OB-Pregnancy care plan goal which identifies if the fetal and maternal status remain reassuring during the birth process  Outcome: Progressing     Problem: Postpartum  Goal: Experiences normal postpartum course  Description:  Postpartum OB-Pregnancy care plan goal which identifies if the mother is experiencing a normal postpartum course  Outcome: Progressing  Goal: Appropriate maternal -  bonding  Description:  Postpartum OB-Pregnancy care plan goal which identifies if the mother and  are bonding appropriately  Outcome: Progressing  Goal: Establishment of infant feeding pattern  Description:  Postpartum OB-Pregnancy care plan goal which identifies if the mother is establishing a feeding pattern with their   Outcome: Progressing  Goal: Incisions, wounds, or drain sites healing without S/S of infection  Outcome: Progressing

## 2025-03-29 NOTE — PLAN OF CARE
Problem: Discharge Planning  Goal: Discharge to home or other facility with appropriate resources  3/29/2025 0806 by Abbi Goldberg RN  Outcome: Progressing  3/29/2025 0206 by Aleena Hartmann RN  Outcome: Progressing     Problem: Risk for Elopement  Goal: Patient will not exit the unit/facility without proper excort  3/29/2025 0806 by Abbi Goldberg RN  Outcome: Progressing  3/29/2025 0206 by Aleena Hartmann RN  Outcome: Progressing     Problem: Skin/Tissue Integrity  Goal: Skin integrity remains intact  Description: 1.  Monitor for areas of redness and/or skin breakdown  2.  Assess vascular access sites hourly  3.  Every 4-6 hours minimum:  Change oxygen saturation probe site  4.  Every 4-6 hours:  If on nasal continuous positive airway pressure, respiratory therapy assess nares and determine need for appliance change or resting period  3/29/2025 0806 by Abbi Goldberg RN  Outcome: Progressing  3/29/2025 0206 by Aleena Hartmann RN  Outcome: Progressing     Problem: ABCDS Injury Assessment  Goal: Absence of physical injury  3/29/2025 0806 by Abbi Goldberg RN  Outcome: Progressing  3/29/2025 0206 by Aleena Hartmann RN  Outcome: Progressing     Problem: Cardiovascular - Adult  Goal: Maintains optimal cardiac output and hemodynamic stability  Description: INTERVENTIONS:.  -Monitor blood pressure and heart rate  -Monitor urine output and notify licensed practitioner for values outside of   -Administer fluid and /or volume expanders as ordered  -Administer vasoactive or antifibrinolytic medications as ordered  -Monitor labs and assess for signs and symptoms of volume excess or deficit  -Monitor response to interventions for patient's volume status, including labs, urine output  -Assess for signs and symptoms of bleeding or hemorrhage  -Monitor labs for bleeding or clotting disorders as ordered  Administer blood products/factors as ordered    3/29/2025 0806 by Abbi Goldberg RN  Outcome:  by Hartmann, Aleena, RN  Outcome: Progressing     Problem: Postpartum  Goal: Experiences normal postpartum course  Description:  Postpartum OB-Pregnancy care plan goal which identifies if the mother is experiencing a normal postpartum course  3/29/2025 0806 by Abbi Goldberg RN  Outcome: Progressing  3/29/2025 0206 by Aleena Hartmann RN  Outcome: Progressing  Goal: Appropriate maternal -  bonding  Description:  Postpartum OB-Pregnancy care plan goal which identifies if the mother and  are bonding appropriately  3/29/2025 0806 by Abbi Goldberg RN  Outcome: Progressing  3/29/2025 0206 by Aleena Hartmann RN  Outcome: Progressing  Goal: Establishment of infant feeding pattern  Description:  Postpartum OB-Pregnancy care plan goal which identifies if the mother is establishing a feeding pattern with their   3/29/2025 0806 by Abbi Goldberg RN  Outcome: Progressing  3/29/2025 0206 by Aleena Hartmann RN  Outcome: Progressing  Goal: Incisions, wounds, or drain sites healing without S/S of infection  3/29/2025 0806 by Abbi Goldberg RN  Outcome: Progressing  3/29/2025 0206 by Aleena Hartmann RN  Outcome: Progressing

## 2025-03-29 NOTE — PROGRESS NOTES
Repeat LTCS of viable baby boy at 1924 by Dr. Melendez. Delayed cord clamping performed. APGARs 9/9. NICU present at delivery. VSS. Mother and baby bonding well.

## 2025-03-29 NOTE — FLOWSHEET NOTE
Nurse removed Fox catheter at this time and assisted patient up to bathroom. Tolerated well and provided with pericare. Nurse took patient and her adult son to NICU to visit baby at this time.

## 2025-03-29 NOTE — ANESTHESIA POSTPROCEDURE EVALUATION
Department of Anesthesiology  Postprocedure Note    Patient: Jesse Jack  MRN: [id number]  YOB: 1985  Date of evaluation: 3/29/2025    Procedure Summary       Date: 25 Room / Location: [address]    Anesthesia Start:  Anesthesia Stop:     Procedure:  SECTION BILATERAL SALPINGECTOMY (Bilateral: Uterus) Diagnosis:       S/P repeat low transverse       (S/P repeat low transverse  [Z98.891])    Surgeons: Dipika Fajardo MD Responsible Provider: Edith Parker MD    Anesthesia Type: Spinal ASA Status: 3            Anesthesia Type: Spinal    Prince Phase I: Prince Score: 10    Prince Phase II: Prince Score: 10    Anesthesia Post Evaluation    Patient location during evaluation: bedside  Patient participation: complete - patient participated  Level of consciousness: awake and alert  Pain score: 0  Airway patency: patent  Nausea & Vomiting: no nausea and no vomiting  Cardiovascular status: hemodynamically stable  Respiratory status: acceptable, room air and spontaneous ventilation  Hydration status: stable  Comments: Patient denies questions or needs at this time.  Multimodal analgesia pain management approach        No notable events documented.

## 2025-03-29 NOTE — PROGRESS NOTES
Pt resting quietly in bed.  Assessment as charted.  Pt plans to go visit infant in NICU after breakfast.  Pt instructed to call with any needs or concerns.  Pt voiced understanding.

## 2025-03-29 NOTE — PROGRESS NOTES
Subjective:    Patient without complaints.  Normal lochia.  Tolerating PO. Denies headaches, visual changes, dyspnea or chest pain.  Bowel movements: No  Flatus: No  Ambulating: Yes    Objective:  /63   Pulse 58   Temp 97.7 °F (36.5 °C) (Oral)   Resp 16   LMP 03/16/2024 (Exact Date) Comment: poic preg in pre op negative  SpO2 94%   Breastfeeding Unknown   Lungs:  CTA   Cardiac:  Regular rhythm  Abdomen:  Uterus firm, non-tender, incision C/D/I, +bs  Extremities:  No calf pain    Recent Labs     03/27/25  2228 03/28/25  1640 03/29/25  0531   WBC 8.9 9.2 9.5   RBC 3.24* 3.54 3.23*   HGB 9.4* 10.1* 9.3*   HCT 29.6* 32.8* 29.9*   MCV 91.4 92.7 92.6   MCH 29.0 28.5 28.8   MCHC 31.8* 30.8* 31.1*   RDW 13.8 13.8 13.7    235 192   MPV 10.3 10.5 10.7        Assessment:  Post-operative day # 1  Repeat c/s at 37 wks  ama    Plan:Continue current care

## 2025-03-29 NOTE — PROGRESS NOTES
NICU called to reevaluate baby. High pitched cry that sounds like grunting. Baby is tachycardic and tachypneic as well. Baby O2 saturation in low to mid 80's. Started blow by.

## 2025-03-30 PROCEDURE — 1220000000 HC SEMI PRIVATE OB R&B

## 2025-03-30 PROCEDURE — 6370000000 HC RX 637 (ALT 250 FOR IP): Performed by: OBSTETRICS & GYNECOLOGY

## 2025-03-30 RX ADMIN — DOCUSATE SODIUM 100 MG: 100 CAPSULE, LIQUID FILLED ORAL at 20:19

## 2025-03-30 RX ADMIN — SIMETHICONE 80 MG: 80 TABLET, CHEWABLE ORAL at 07:57

## 2025-03-30 RX ADMIN — OXYCODONE HYDROCHLORIDE 10 MG: 5 TABLET ORAL at 11:59

## 2025-03-30 RX ADMIN — OXYCODONE HYDROCHLORIDE 10 MG: 5 TABLET ORAL at 16:10

## 2025-03-30 RX ADMIN — OXYCODONE HYDROCHLORIDE 10 MG: 5 TABLET ORAL at 07:57

## 2025-03-30 RX ADMIN — PRENATAL WITH FERROUS FUM AND FOLIC ACID 1 TABLET: 3080; 920; 120; 400; 22; 1.84; 3; 20; 10; 1; 12; 200; 27; 25; 2 TABLET ORAL at 07:57

## 2025-03-30 RX ADMIN — DOCUSATE SODIUM 100 MG: 100 CAPSULE, LIQUID FILLED ORAL at 07:57

## 2025-03-30 RX ADMIN — OXYCODONE HYDROCHLORIDE 10 MG: 5 TABLET ORAL at 03:25

## 2025-03-30 RX ADMIN — IBUPROFEN 800 MG: 800 TABLET, FILM COATED ORAL at 16:10

## 2025-03-30 RX ADMIN — ACETAMINOPHEN 1000 MG: 500 TABLET ORAL at 11:31

## 2025-03-30 RX ADMIN — IBUPROFEN 800 MG: 800 TABLET, FILM COATED ORAL at 06:41

## 2025-03-30 RX ADMIN — ACETAMINOPHEN 1000 MG: 500 TABLET ORAL at 20:20

## 2025-03-30 RX ADMIN — OXYCODONE HYDROCHLORIDE 10 MG: 5 TABLET ORAL at 20:21

## 2025-03-30 RX ADMIN — ACETAMINOPHEN 1000 MG: 500 TABLET ORAL at 03:24

## 2025-03-30 ASSESSMENT — PAIN SCALES - GENERAL
PAINLEVEL_OUTOF10: 7
PAINLEVEL_OUTOF10: 9
PAINLEVEL_OUTOF10: 10
PAINLEVEL_OUTOF10: 8
PAINLEVEL_OUTOF10: 3
PAINLEVEL_OUTOF10: 9
PAINLEVEL_OUTOF10: 9
PAINLEVEL_OUTOF10: 10
PAINLEVEL_OUTOF10: 3

## 2025-03-30 ASSESSMENT — PAIN DESCRIPTION - DESCRIPTORS
DESCRIPTORS: SORE;DISCOMFORT
DESCRIPTORS: SORE;CRAMPING;DISCOMFORT
DESCRIPTORS: CRAMPING;SORE
DESCRIPTORS: SORE;DISCOMFORT
DESCRIPTORS: SORE;DISCOMFORT
DESCRIPTORS: CRAMPING;SORE;DISCOMFORT

## 2025-03-30 ASSESSMENT — PAIN - FUNCTIONAL ASSESSMENT
PAIN_FUNCTIONAL_ASSESSMENT: ACTIVITIES ARE NOT PREVENTED

## 2025-03-30 ASSESSMENT — PAIN DESCRIPTION - ORIENTATION
ORIENTATION: LOWER

## 2025-03-30 ASSESSMENT — PAIN DESCRIPTION - LOCATION
LOCATION: ABDOMEN;INCISION
LOCATION: ABDOMEN

## 2025-03-30 NOTE — PROGRESS NOTES
Pt resting in bed.  Assessment as charted.  Pt plans to go down to visit infant in NICU after breakfast.  Pt instructed to call with any needs or concerns.  Pt voiced understanding.

## 2025-03-30 NOTE — PLAN OF CARE
Problem: Discharge Planning  Goal: Discharge to home or other facility with appropriate resources  Outcome: Progressing     Problem: Risk for Elopement  Goal: Patient will not exit the unit/facility without proper excort  Outcome: Progressing     Problem: Skin/Tissue Integrity  Goal: Skin integrity remains intact  Description: 1.  Monitor for areas of redness and/or skin breakdown  2.  Assess vascular access sites hourly  3.  Every 4-6 hours minimum:  Change oxygen saturation probe site  4.  Every 4-6 hours:  If on nasal continuous positive airway pressure, respiratory therapy assess nares and determine need for appliance change or resting period  Outcome: Progressing     Problem: ABCDS Injury Assessment  Goal: Absence of physical injury  Outcome: Progressing     Problem: Cardiovascular - Adult  Goal: Maintains optimal cardiac output and hemodynamic stability  Description: INTERVENTIONS:.  -Monitor blood pressure and heart rate  -Monitor urine output and notify licensed practitioner for values outside of   -Administer fluid and /or volume expanders as ordered  -Administer vasoactive or antifibrinolytic medications as ordered  -Monitor labs and assess for signs and symptoms of volume excess or deficit  -Monitor response to interventions for patient's volume status, including labs, urine output  -Assess for signs and symptoms of bleeding or hemorrhage  -Monitor labs for bleeding or clotting disorders as ordered  Administer blood products/factors as ordered    Outcome: Progressing     Problem: Respiratory - Adult  Goal: Achieves optimal ventilation and oxygenation  Description: INTERVENTIONS:.  -Assess for changes in respiratory status   -Assess for changes in mentation and behavior  -Position to facilitate oxygenation and minimize respiratory effort  -Oxygen supplementation based on oxygen saturation or arterial blood gases  -Assess and instruct to report shortness of breath or any respiratory

## 2025-03-30 NOTE — PROGRESS NOTES
Department of Obstetrics and Gynecology  Labor and Delivery  Attending Post Partum Progress Note    Subjective:   Pt seen & examined, no overnight complaints.  Pain well controlled.  Lochia decreasing.  Denies any fevers, chills, weakness, dizziness, headache, chest pain, vision changes, SOB, nausea, or vomiting. Voided spontaneously Ambulating    Review of Systems  Skin: no changes  All other review of systems negative    Physical Exam:  Vitals:    03/29/25 1847 03/29/25 2256 03/29/25 2257 03/30/25 0646   BP: 100/61  110/68 105/62   Pulse: 78  69 68   Resp: 17 16 17    Temp: 97.9 °F (36.6 °C)  98.4 °F (36.9 °C) 97.9 °F (36.6 °C)   TempSrc: Oral  Oral    SpO2: 99%  98%        General: NAD, comfortable  CV: regular rate and rhythm  Lungs: clear to auscultation bilaterally  Abd: soft, non tender  Incision: c/d/i  Ext: no swelling      Labs:  Admission on 03/28/2025   Component Date Value Ref Range Status    WBC 03/28/2025 9.2  4.5 - 11.5 k/uL Final    RBC 03/28/2025 3.54  3.50 - 5.50 m/uL Final    Hemoglobin 03/28/2025 10.1 (L)  11.5 - 15.5 g/dL Final    Hematocrit 03/28/2025 32.8 (L)  34.0 - 48.0 % Final    MCV 03/28/2025 92.7  80.0 - 99.9 fL Final    MCH 03/28/2025 28.5  26.0 - 35.0 pg Final    MCHC 03/28/2025 30.8 (L)  32.0 - 34.5 g/dL Final    RDW 03/28/2025 13.8  11.5 - 15.0 % Final    Platelets 03/28/2025 235  130 - 450 k/uL Final    MPV 03/28/2025 10.5  7.0 - 12.0 fL Final    Blood Bank Sample Expiration 03/28/2025 03/31/2025,2359   Final    Arm Band Number 03/28/2025 [id number]   Final    ABO/Rh 03/28/2025 O POSITIVE   Final    Antibody Screen 03/28/2025 NEGATIVE   Final    Unit Number 03/28/2025 [id number]   Final    Component 03/28/2025 Leukocyte Reduced Red Cell   Final    Unit Divison 03/28/2025 00   Final    Dispense Status Blood Bank 03/28/2025 ALLOCATED   Preliminary    Transfusion Status 03/28/2025 OK TO TRANSFUSE   Final    Crossmatch Result 03/28/2025 COMPATIBLE   Final    Unit Number 03/28/2025  [id number]   Final    Component 03/28/2025 Leukocyte Reduced Red Cell   Final    Unit Divison 03/28/2025 00   Final    Dispense Status Blood Bank 03/28/2025 ALLOCATED   Preliminary    Transfusion Status 03/28/2025 OK TO TRANSFUSE   Final    Crossmatch Result 03/28/2025 COMPATIBLE   Final    Amphetamine Screen, Ur 03/28/2025 POSITIVE (A)  NEGATIVE Final    Comment: Cutoff: 1000 ng/mL        High concentrations of ephedrine/pseudoephedrine or phenylpropanolamine may cause false   positive results for amphetamine.  Therefore, confirmatory testing for amphetamine should be considered if clinically   indicated.      Barbiturate Screen, Ur 03/28/2025 NEGATIVE  NEGATIVE Final    Cutoff: 200 ng/ml    Cocaine Metabolite, Urine 03/28/2025 NEGATIVE  NEGATIVE Final    Cutoff: 300 ng/ml    Benzodiazepine Screen, Urine 03/28/2025 NEGATIVE  NEGATIVE Final    Cutoff: 200 ng/ml    Buprenorphine Urine 03/28/2025 NEGATIVE  NEGATIVE Final    Cutoff: 5 ng/ml    Methadone Screen, Urine 03/28/2025 NEGATIVE  NEGATIVE Final    Cutoff: 300 ng/ml    Opiates, Urine 03/28/2025 NEGATIVE  NEGATIVE Final    Comment: Cutoff: 300 ng/ml  Note: The Opiate screen is not intended to detect Oxycodone.      Phencyclidine, Urine 03/28/2025 NEGATIVE  NEGATIVE Final    Cutoff: 25 ng/ml    Cannabinoid Scrn, Ur 03/28/2025 POSITIVE (A)  NEGATIVE Final    Cutoff: 50 ng/ml    Fentanyl, Ur 03/28/2025 NEGATIVE  NEGATIVE Final    Cutoff: 1.0 ng/ml    Oxycodone Screen, Ur 03/28/2025 NEGATIVE  NEGATIVE Final    Cutoff: 100 ng/ml    Test Information 03/28/2025 These drug screen results are for medical purposes only and should not be considered definitive or confirmed.   Final    Comment: The drug methodology concentration value must be greater than or equal to the cutoff to be   reported as positive.  Confirmtory testing orders and/or interpretive sceening questions can be directed to   toxicology at [phone].        The absence of expected drug(s) and/or  metabolite(s) may be due to inappropriate timing of   specimen collection relative to drug administration, poor drug absorption,   diluted/adulterated urine, or limitations of screening methodology.      WBC 03/29/2025 9.5  4.5 - 11.5 k/uL Final    RBC 03/29/2025 3.23 (L)  3.50 - 5.50 m/uL Final    Hemoglobin 03/29/2025 9.3 (L)  11.5 - 15.5 g/dL Final    Hematocrit 03/29/2025 29.9 (L)  34.0 - 48.0 % Final    MCV 03/29/2025 92.6  80.0 - 99.9 fL Final    MCH 03/29/2025 28.8  26.0 - 35.0 pg Final    MCHC 03/29/2025 31.1 (L)  32.0 - 34.5 g/dL Final    RDW 03/29/2025 13.7  11.5 - 15.0 % Final    Platelets 03/29/2025 192  130 - 450 k/uL Final    MPV 03/29/2025 10.7  7.0 - 12.0 fL Final       Assessment/Plan:    Jesse Jack is a 39 y.o. female POD #2 s/p LTCS with appropriate postoperative recovery.      1. Neuro: ERAD protocol  2. CV: no issues  3. Pulm: encouraged IS  4. GI: tolerating regular diet   -senna prn  -zofran/reglan prn  5. : appropriate UOP  6. Heme: postop hct, appropriate and asymptomatic  7. Postpartum: Per below  Rh +  MMR and Tdap if indicated  -encouraged pumping, lactation consult prn  -local wound care  8. Prophy: scds, oob as tolerated, IS  9. Dispo: KATHY Wilkinson MD, MPH

## 2025-03-31 VITALS
DIASTOLIC BLOOD PRESSURE: 71 MMHG | OXYGEN SATURATION: 98 % | HEART RATE: 68 BPM | SYSTOLIC BLOOD PRESSURE: 108 MMHG | TEMPERATURE: 98.6 F | RESPIRATION RATE: 16 BRPM

## 2025-03-31 LAB
ABNORMAL SPECIMEN VALIDITY TEST: NORMAL
AMPHET UR-MCNC: >1000 NG/ML
COMPLIANCE DRUG ANALYSIS, URINE: NORMAL
EPHEDRIN UR QL: <100 NG/ML
INTEGRITY CHECK, CREATININE, URINE: 59.3 MG/DL (ref 22–250)
INTEGRITY CHECK, OXIDANT, URINE: <40 MG/L
INTEGRITY CHECK, PH, URINE: 7.1 (ref 4.5–9)
INTEGRITY CHECK, SPECIFIC GRAVITY, URINE: 1.01 (ref 1–1.03)
MDA, QUANTITATIVE, URINE: <100 NG/ML
MDEA, QUANTITATIVE, URINE: <100 NG/ML
MDMA UR QL: <100 NG/ML
METHAMPHETAMINE QUANTITATIVE URINE: <100 NG/ML
PHENTERMINE, URINE, QUANTITATIVE: <100 NG/ML
THC NORMALIZED, QUANTITIATIVE, URINE: 924.8 NG/ML
THC-COOH, QUANTITATIVE, URINE: 548.4 NG/ML

## 2025-03-31 PROCEDURE — 59514 CESAREAN DELIVERY ONLY: CPT | Performed by: OBSTETRICS & GYNECOLOGY

## 2025-03-31 PROCEDURE — 6370000000 HC RX 637 (ALT 250 FOR IP): Performed by: OBSTETRICS & GYNECOLOGY

## 2025-03-31 PROCEDURE — 6370000000 HC RX 637 (ALT 250 FOR IP): Performed by: FAMILY MEDICINE

## 2025-03-31 RX ORDER — GABAPENTIN 100 MG/1
100 CAPSULE ORAL 3 TIMES DAILY
Qty: 30 CAPSULE | Refills: 0 | Status: SHIPPED | OUTPATIENT
Start: 2025-03-31 | End: 2025-04-10

## 2025-03-31 RX ORDER — OXYCODONE HYDROCHLORIDE 5 MG/1
5-10 TABLET ORAL EVERY 6 HOURS PRN
Qty: 28 TABLET | Refills: 0 | Status: SHIPPED | OUTPATIENT
Start: 2025-03-31 | End: 2025-04-07

## 2025-03-31 RX ORDER — PSEUDOEPHEDRINE HCL 30 MG
100 TABLET ORAL 2 TIMES DAILY PRN
Qty: 60 CAPSULE | Refills: 0 | Status: SHIPPED | OUTPATIENT
Start: 2025-03-31

## 2025-03-31 RX ORDER — IBUPROFEN 800 MG/1
800 TABLET, FILM COATED ORAL EVERY 8 HOURS PRN
Qty: 120 TABLET | Refills: 0 | Status: SHIPPED | OUTPATIENT
Start: 2025-03-31

## 2025-03-31 RX ORDER — GABAPENTIN 100 MG/1
100 CAPSULE ORAL 3 TIMES DAILY
Status: DISCONTINUED | OUTPATIENT
Start: 2025-03-31 | End: 2025-03-31 | Stop reason: HOSPADM

## 2025-03-31 RX ORDER — FERROUS SULFATE 325(65) MG
325 TABLET ORAL EVERY OTHER DAY
Qty: 30 TABLET | Refills: 0 | Status: SHIPPED | OUTPATIENT
Start: 2025-03-31

## 2025-03-31 RX ADMIN — DOCUSATE SODIUM 100 MG: 100 CAPSULE, LIQUID FILLED ORAL at 09:38

## 2025-03-31 RX ADMIN — ACETAMINOPHEN 1000 MG: 500 TABLET ORAL at 17:57

## 2025-03-31 RX ADMIN — OXYCODONE HYDROCHLORIDE 10 MG: 5 TABLET ORAL at 17:56

## 2025-03-31 RX ADMIN — OXYCODONE HYDROCHLORIDE 10 MG: 5 TABLET ORAL at 05:24

## 2025-03-31 RX ADMIN — FERROUS SULFATE TAB 325 MG (65 MG ELEMENTAL FE) 325 MG: 325 (65 FE) TAB at 09:38

## 2025-03-31 RX ADMIN — ACETAMINOPHEN 1000 MG: 500 TABLET ORAL at 05:25

## 2025-03-31 RX ADMIN — OXYCODONE HYDROCHLORIDE 10 MG: 5 TABLET ORAL at 09:38

## 2025-03-31 RX ADMIN — SIMETHICONE 80 MG: 80 TABLET, CHEWABLE ORAL at 09:38

## 2025-03-31 RX ADMIN — OXYCODONE HYDROCHLORIDE 10 MG: 5 TABLET ORAL at 13:43

## 2025-03-31 RX ADMIN — IBUPROFEN 800 MG: 800 TABLET, FILM COATED ORAL at 00:33

## 2025-03-31 RX ADMIN — GABAPENTIN 100 MG: 100 CAPSULE ORAL at 09:37

## 2025-03-31 RX ADMIN — OXYCODONE HYDROCHLORIDE 10 MG: 5 TABLET ORAL at 00:33

## 2025-03-31 RX ADMIN — GABAPENTIN 100 MG: 100 CAPSULE ORAL at 13:44

## 2025-03-31 ASSESSMENT — PAIN DESCRIPTION - DESCRIPTORS
DESCRIPTORS: ACHING;BURNING;SORE
DESCRIPTORS: SORE;DISCOMFORT
DESCRIPTORS: BURNING;ACHING
DESCRIPTORS: SORE;BURNING;DISCOMFORT
DESCRIPTORS: ACHING

## 2025-03-31 ASSESSMENT — PAIN DESCRIPTION - LOCATION
LOCATION: ABDOMEN
LOCATION: ABDOMEN;INCISION
LOCATION: ABDOMEN
LOCATION: HEAD
LOCATION: ABDOMEN;INCISION

## 2025-03-31 ASSESSMENT — PAIN SCALES - GENERAL
PAINLEVEL_OUTOF10: 9
PAINLEVEL_OUTOF10: 8
PAINLEVEL_OUTOF10: 3
PAINLEVEL_OUTOF10: 3
PAINLEVEL_OUTOF10: 9

## 2025-03-31 ASSESSMENT — PAIN - FUNCTIONAL ASSESSMENT
PAIN_FUNCTIONAL_ASSESSMENT: ACTIVITIES ARE NOT PREVENTED

## 2025-03-31 ASSESSMENT — PAIN DESCRIPTION - ORIENTATION: ORIENTATION: LOWER

## 2025-03-31 NOTE — CARE COORDINATION
SW Discharge Planning     SW received a call from Jen Osman. Jen reported that a new  has been assigned for \" fresh eyes\" on the case. Jen stated that the new  will be contacting  shortly. Jen did report that paternity of baby is being questioned. Jen was made aware that discharge is today, and expressed understanding     PLAN    Baby can NOT be discharged home until [facility] ( [phone]) provides disposition  SW to continue communication with nursing staff and [facility] ( [phone])      Electronically signed by EV Mata on 3/31/2025 at 11:00 AM

## 2025-03-31 NOTE — CARE COORDINATION
SW Discharge Planning     SW has left two voicemail ( one at 1 pm, pone at 3:15) to [facility] ( [phone]) , Jen Osman requesting disposition. SW was also informed that second  working with Jen is Mima Huang. SW left a voicemail for Mima requesting disposition.    PLAN    Baby can NOT be discharged home until [facility] ( [phone]) provides disposition  SW to continue communication with nursing staff and [facility] ( [phone])      Electronically signed by EV Mata on 3/31/2025 at 3:21 PM

## 2025-03-31 NOTE — PROGRESS NOTES
Pt resting in bed bonding w baby; states taking general diet w/o nausea, passing gas and pain meds effective; denies BM yet and denies any assoc discomfort; states  feeling good and wants to go home today.

## 2025-03-31 NOTE — CARE COORDINATION
SW Discharge Planning     SW spoke with [facility] ( [phone]) supervisor Devaughn Cespedes who reported that baby CAN be discharged home to mother and they will follow in the community.     PLAN    Baby CAN be discharged home when medically ready, children services WILL be following in the community       Electronically signed by EV Mata on 3/31/2025 at 3:47 PM

## 2025-03-31 NOTE — PROGRESS NOTES
CLINICAL PHARMACY NOTE: MEDS TO BEDS    Total # of Prescriptions Filled: 5   The following medications were delivered to the patient:  Docusate 100 mg  Gabapentin 100 mg  Ibuprofen 800 mg  Oxycodone 5 mg  Ferosul 325 mg    Additional Documentation:     Delivered meds to patient at bedside
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
## 2025-03-31 NOTE — PROGRESS NOTES
[employer] [place] [facility] screening results were discussed with parent. Questions answered. Brochure given to parent. Advised to monitor developmental milestones and contact physician for any concerns.   Broderick Mcconnell
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
## 2025-03-31 NOTE — DISCHARGE SUMMARY
Obstetrical Discharge Form     s/p rLTCS (CS #6) at 37w2. Post op course has been uncomplicated. Meeting postpartum milestones. Ambulating and voiding without difficulty. Lochia less than menses, pain controlled. PP Hb 9.3.  hx of back pain w/ sciatica, reports similar pain today. Gabapentin restarted. Discharged on POD 2. Follow up one week for incision check.      Vitals:    25 0655   BP: 108/71   Pulse: 68   Resp: 16   Temp: 98.6 °F (37 °C)   SpO2: 98%         Primary OB Clinician: Dr. Fajardo/[facility]      EDC: Estimated Date of Delivery: 25    Gestational Age:37w2d    Antepartum complications: None    Date of Delivery: 3/28/2025 ; Time of Delivery:      Delivered By: Maura Melendez,     Delivery Type: repeat  section, low transverse incision    Tubal Ligation: n/a    Baby: Liveborn male, Apgars 8/9, weight 6 #    Anesthesia: Spinal    Intrapartum complications: None    Laceration: n/a    Episiotomy: none,    Placenta: manual removal    Feeding method: bottle     Rh Immune globulin given: not applicable    Rubella vaccine given: immune     Discharge Date: 3/31/2025    Early Discharge:  NO    Condition: good     Plan:     Address and phone number verified and same.  Follow-up appointment with [facility] in 1 weeks.  Discharge to home

## 2025-04-01 LAB
ABO/RH: NORMAL
ANTIBODY SCREEN: NEGATIVE
ARM BAND NUMBER: NORMAL
BLOOD BANK DISPENSE STATUS: NORMAL
BLOOD BANK DISPENSE STATUS: NORMAL
BLOOD BANK SAMPLE EXPIRATION: NORMAL
BPU ID: NORMAL
BPU ID: NORMAL
COMPONENT: NORMAL
COMPONENT: NORMAL
CROSSMATCH RESULT: NORMAL
CROSSMATCH RESULT: NORMAL
TRANSFUSION STATUS: NORMAL
TRANSFUSION STATUS: NORMAL
UNIT DIVISION: 0
UNIT DIVISION: 0

## 2025-04-02 ENCOUNTER — TELEPHONE (OUTPATIENT)
Dept: ADMINISTRATIVE | Age: 40
End: 2025-04-02

## 2025-04-02 NOTE — TELEPHONE ENCOUNTER
Patient calling for 1 week post partum follow up , Please advise scheduling. Patient can be  reached at [phone]

## 2025-04-07 LAB — SURGICAL PATHOLOGY REPORT: NORMAL

## 2025-04-11 ENCOUNTER — POSTPARTUM VISIT (OUTPATIENT)
Dept: OBGYN | Age: 40
End: 2025-04-11
Payer: COMMERCIAL

## 2025-04-11 VITALS
DIASTOLIC BLOOD PRESSURE: 99 MMHG | OXYGEN SATURATION: 97 % | TEMPERATURE: 97.8 F | HEART RATE: 86 BPM | SYSTOLIC BLOOD PRESSURE: 138 MMHG | BODY MASS INDEX: 33.07 KG/M2 | WEIGHT: 175 LBS

## 2025-04-11 DIAGNOSIS — Z48.89 ENCOUNTER FOR POST SURGICAL WOUND CHECK: Primary | ICD-10-CM

## 2025-04-11 DIAGNOSIS — G97.1 POSTOPERATIVE SPINAL HEADACHE: ICD-10-CM

## 2025-04-11 PROCEDURE — 99213 OFFICE O/P EST LOW 20 MIN: CPT | Performed by: STUDENT IN AN ORGANIZED HEALTH CARE EDUCATION/TRAINING PROGRAM

## 2025-04-11 RX ORDER — BUTALBITAL, ACETAMINOPHEN AND CAFFEINE 300; 40; 50 MG/1; MG/1; MG/1
1 CAPSULE ORAL EVERY 4 HOURS PRN
Qty: 42 CAPSULE | Refills: 0 | Status: SHIPPED | OUTPATIENT
Start: 2025-04-11 | End: 2025-04-18

## 2025-04-11 NOTE — PROGRESS NOTES
Here today for postpartum incision check   on 3/28/2025, boy 6# and is bottle feeding.  Patient has complaints of still feeling contractions and has headaches. \"Bladder hurst when I use the bathroom\"  Headaches wake her up at night. Feels like a bad migraine but not sensitive to light. Also constipated.   And waves of nausea still  Discharge instructions have been discussed with the patient. Patient advised to call our office with any questions or concerns.   Voiced understanding.

## 2025-04-11 NOTE — PROGRESS NOTES
Patient is post  1 week, here for routine wound check, no complaint    PE:  incision healed very well, soft, no separation/induration/tenderness/oozing;     She will be back in 4 to 5 weeks for routine postpartum check.    About 10 minutes spent for patient    She also complained of headaches since surgery, she can be improved a little bit by drinking coffee.    Told her this might be spinal headache, will prescribe Fioricet

## (undated) DEVICE — SYRINGE MED 10ML LUERLOCK TIP W/O SFTY DISP

## (undated) DEVICE — GLOVE ORANGE PI 7 1/2   MSG9075

## (undated) DEVICE — DRAPE,REIN 53X77,STERILE: Brand: MEDLINE

## (undated) DEVICE — CESAREAN BIRTH PACK: Brand: MEDLINE INDUSTRIES, INC.

## (undated) DEVICE — TUBING, SUCTION, 3/16" X 12', STRAIGHT: Brand: MEDLINE

## (undated) DEVICE — LIQUIBAND RAPID ADHESIVE 36/CS 0.8ML: Brand: MEDLINE

## (undated) DEVICE — GLOVE SURG SZ 65 THK91MIL LTX FREE SYN POLYISOPRENE

## (undated) DEVICE — ELECTRODE PT RET AD L9FT HI MOIST COND ADH HYDRGEL CORDED

## (undated) DEVICE — SUTURE VICRYL + SZ 0 L36IN ABSRB VLT L36MM CT-1 1/2 CIR VCP346H

## (undated) DEVICE — UNIVERSAL DRAPE: Brand: MEDLINE INDUSTRIES, INC.

## (undated) DEVICE — Device

## (undated) DEVICE — MARKER SURG MARGIN STD 6 CLR INK ASST CORR-MIN ORDER 6 EACH

## (undated) DEVICE — STRIP,CLOSURE,WOUND,MEDI-STRIP,1/2X4: Brand: MEDLINE

## (undated) DEVICE — APPLICATOR MEDICATED 26 CC SOLUTION HI LT ORNG CHLORAPREP

## (undated) DEVICE — 3M™ STERI-STRIP™ REINFORCED ADHESIVE SKIN CLOSURES, R1547, 1/2 IN X 4 IN (12 MM X 100 MM), 6 STRIPS/ENVELOPE: Brand: 3M™ STERI-STRIP™

## (undated) DEVICE — STANDARD HYPODERMIC NEEDLE,ALUMINUM HUB: Brand: MONOJECT

## (undated) DEVICE — DRESSING FOAM POST OPERATIVE 4X10 IN MEPILEX BORDER AG

## (undated) DEVICE — DRESSING COMP W4XL4IN N ADH PD W2.5XL2.5IN GZ BORDERED ADH

## (undated) DEVICE — SEALER ENDOSCP NANO COAT OPN DIV CRV L JAW LIGASURE IMPACT

## (undated) DEVICE — PLASMABLADE PS210-030S 3.0S LOCK: Brand: PLASMABLADE™

## (undated) DEVICE — APPLIER LIG CLP M L11IN TI STR RNG HNDL FOR 20 CLP DISP

## (undated) DEVICE — GLOVE ORANGE PI 7   MSG9070

## (undated) DEVICE — SUTURE CHROMIC GUT SZ 2-0 L36IN ABSRB BRN L36MM CT-1 1/2 923H

## (undated) DEVICE — CONTAINER 40ML NEUT BUFF FRMLN PREFIL STATLAB

## (undated) DEVICE — SYRINGE MED 10ML TRNSLUC BRL PLUNG BLK MRK POLYPR CTRL

## (undated) DEVICE — 2134367

## (undated) DEVICE — SUTURE STRATAFIX SYMMETRIC PDS + SZ 1 L18IN ABSRB VLT L48MM SXPP1A400

## (undated) DEVICE — TOWEL,OR,DSP,ST,BLUE,STD,6/PK,12PK/CS: Brand: MEDLINE

## (undated) DEVICE — SOLUTION IRRIG 500ML 0.9% SOD CHLO USP POUR PLAS BTL

## (undated) DEVICE — 4-PORT MANIFOLD: Brand: NEPTUNE 2

## (undated) DEVICE — GLOVE SURG SZ 75 L12IN FNGR THK79MIL GRN LTX FREE

## (undated) DEVICE — STAPLER SKIN SQ 30 ABSRB STPL DISP INSORB ORDER VIA PHONE OR EMAIL

## (undated) DEVICE — COVER,LIGHT HANDLE,FLX,2/PK: Brand: MEDLINE INDUSTRIES, INC.